# Patient Record
Sex: MALE | Race: WHITE | NOT HISPANIC OR LATINO | Employment: PART TIME | URBAN - METROPOLITAN AREA
[De-identification: names, ages, dates, MRNs, and addresses within clinical notes are randomized per-mention and may not be internally consistent; named-entity substitution may affect disease eponyms.]

---

## 2017-04-03 ENCOUNTER — LAB VISIT (OUTPATIENT)
Dept: LAB | Facility: HOSPITAL | Age: 77
End: 2017-04-03
Attending: INTERNAL MEDICINE
Payer: MEDICARE

## 2017-04-03 DIAGNOSIS — E11.9 CONTROLLED TYPE 2 DIABETES MELLITUS WITHOUT COMPLICATION, WITHOUT LONG-TERM CURRENT USE OF INSULIN: ICD-10-CM

## 2017-04-03 LAB
ALBUMIN SERPL BCP-MCNC: 3.8 G/DL
ALP SERPL-CCNC: 48 U/L
ALT SERPL W/O P-5'-P-CCNC: 17 U/L
ANION GAP SERPL CALC-SCNC: 8 MMOL/L
AST SERPL-CCNC: 23 U/L
BILIRUB SERPL-MCNC: 1.2 MG/DL
BUN SERPL-MCNC: 24 MG/DL
CALCIUM SERPL-MCNC: 9.8 MG/DL
CHLORIDE SERPL-SCNC: 105 MMOL/L
CHOLEST/HDLC SERPL: 3.3 {RATIO}
CO2 SERPL-SCNC: 27 MMOL/L
CREAT SERPL-MCNC: 1.2 MG/DL
EST. GFR  (AFRICAN AMERICAN): >60 ML/MIN/1.73 M^2
EST. GFR  (NON AFRICAN AMERICAN): 58 ML/MIN/1.73 M^2
GLUCOSE SERPL-MCNC: 97 MG/DL
HDL/CHOLESTEROL RATIO: 30.4 %
HDLC SERPL-MCNC: 181 MG/DL
HDLC SERPL-MCNC: 55 MG/DL
LDLC SERPL CALC-MCNC: 110.8 MG/DL
NONHDLC SERPL-MCNC: 126 MG/DL
POTASSIUM SERPL-SCNC: 4.6 MMOL/L
PROT SERPL-MCNC: 7.6 G/DL
SODIUM SERPL-SCNC: 140 MMOL/L
TRIGL SERPL-MCNC: 76 MG/DL

## 2017-04-03 PROCEDURE — 80053 COMPREHEN METABOLIC PANEL: CPT

## 2017-04-03 PROCEDURE — 36415 COLL VENOUS BLD VENIPUNCTURE: CPT | Mod: PO

## 2017-04-03 PROCEDURE — 83036 HEMOGLOBIN GLYCOSYLATED A1C: CPT

## 2017-04-03 PROCEDURE — 80061 LIPID PANEL: CPT

## 2017-04-04 LAB
ESTIMATED AVG GLUCOSE: 123 MG/DL
HBA1C MFR BLD HPLC: 5.9 %

## 2017-04-10 ENCOUNTER — OFFICE VISIT (OUTPATIENT)
Dept: INTERNAL MEDICINE | Facility: CLINIC | Age: 77
End: 2017-04-10
Payer: MEDICARE

## 2017-04-10 VITALS
RESPIRATION RATE: 20 BRPM | HEART RATE: 46 BPM | SYSTOLIC BLOOD PRESSURE: 149 MMHG | TEMPERATURE: 97 F | WEIGHT: 215.38 LBS | BODY MASS INDEX: 30.83 KG/M2 | HEIGHT: 70 IN | OXYGEN SATURATION: 97 % | DIASTOLIC BLOOD PRESSURE: 71 MMHG

## 2017-04-10 DIAGNOSIS — E11.59 HYPERTENSION ASSOCIATED WITH DIABETES: ICD-10-CM

## 2017-04-10 DIAGNOSIS — E11.9 CONTROLLED TYPE 2 DIABETES MELLITUS WITHOUT COMPLICATION, WITHOUT LONG-TERM CURRENT USE OF INSULIN: Primary | ICD-10-CM

## 2017-04-10 DIAGNOSIS — I15.2 HYPERTENSION ASSOCIATED WITH DIABETES: ICD-10-CM

## 2017-04-10 DIAGNOSIS — E11.69 HYPERLIPIDEMIA ASSOCIATED WITH TYPE 2 DIABETES MELLITUS: ICD-10-CM

## 2017-04-10 DIAGNOSIS — E78.5 HYPERLIPIDEMIA ASSOCIATED WITH TYPE 2 DIABETES MELLITUS: ICD-10-CM

## 2017-04-10 DIAGNOSIS — Z12.5 PROSTATE CANCER SCREENING: ICD-10-CM

## 2017-04-10 DIAGNOSIS — Z12.11 SCREENING FOR COLON CANCER: ICD-10-CM

## 2017-04-10 PROCEDURE — 99214 OFFICE O/P EST MOD 30 MIN: CPT | Mod: S$PBB,,, | Performed by: INTERNAL MEDICINE

## 2017-04-10 PROCEDURE — 99999 PR PBB SHADOW E&M-EST. PATIENT-LVL III: CPT | Mod: PBBFAC,,, | Performed by: INTERNAL MEDICINE

## 2017-04-10 PROCEDURE — 99213 OFFICE O/P EST LOW 20 MIN: CPT | Mod: PBBFAC,PO | Performed by: INTERNAL MEDICINE

## 2017-04-10 NOTE — PROGRESS NOTES
"HPI:  Patient is a 76-year-old man who comes in today for follow-up his diabetes, hypertension, lipids.  Patient does not check his blood pressure at this time.  His blood sugars have been doing well.  He denies any new other problems or complaints.    Current meds have been verified and updated per the EMR  Exam:BP (!) 149/71 (BP Location: Right arm, Patient Position: Sitting, BP Method: Automatic)  Pulse (!) 46  Temp 96.5 °F (35.8 °C) (Tympanic)   Resp 20  Ht 5' 9.69" (1.77 m)  Wt 97.7 kg (215 lb 6.2 oz)  SpO2 97%  BMI 31.19 kg/m2  Chest clear  Cardiovascular regular rate and rhythm without murmur, gallop or rub  Extremities without edema  Lab Results   Component Value Date    WBC 6.43 10/15/2016    HGB 14.5 10/15/2016    HCT 43.1 10/15/2016     10/15/2016    CHOL 181 04/03/2017    TRIG 76 04/03/2017    HDL 55 04/03/2017    ALT 17 04/03/2017    AST 23 04/03/2017     04/03/2017    K 4.6 04/03/2017     04/03/2017    CREATININE 1.2 04/03/2017    BUN 24 (H) 04/03/2017    CO2 27 04/03/2017    TSH 1.443 10/15/2016    PSA 2.9 10/15/2016    INR 1.0 01/08/2010    HGBA1C 5.9 04/03/2017       Impression:  Hypertension, not as well controlled as we would like  Diabetes, well controlled  Patient Active Problem List   Diagnosis    Anxiety    Depression    Hyperlipidemia associated with type 2 diabetes mellitus    Hypertension associated with diabetes    Diabetes mellitus type II, controlled    Left inguinal hernia       Plan:  Orders Placed This Encounter    Hemoglobin A1c    Comprehensive metabolic panel    Lipid panel    Protein / creatinine ratio, urine    TSH    CBC auto differential    PSA, Screening     He is due for his colonoscopy.  He will start to check his blood pressure twice a week and let me know if it's not 130/80 at home.  Patient will see me in 6 months with above lab work.  His other medications remain the same    "

## 2017-04-10 NOTE — MR AVS SNAPSHOT
[facility]  [address] [place] [id number]  Phone: [phone]                  Bi Jessica   4/10/2017 2:20 PM   Office Visit    Description:  Male : 1940   Provider:  Thee Zarate MD   Department:  [facility]           Reason for Visit     Follow-up           Diagnoses this Visit        Comments    Controlled type 2 diabetes mellitus without complication, without long-term current use of insulin    -  Primary     Hyperlipidemia associated with type 2 diabetes mellitus         Hypertension associated with diabetes         Prostate cancer screening                To Do List           Goals (5 Years of Data)     None      Follow-Up and Disposition     Return in about 6 months (around 10/10/2017).      [facility]     [facility] On Call Nurse Care Line -  Assistance  Unless otherwise directed by your provider, please contact [facility], our nurse care line that is available for  assistance.     Registered nurses in the [facility] On Call Center provide: appointment scheduling, clinical advisement, health education, and other advisory services.  Call: [phone] (toll free)               Medications           Message regarding Medications     Verify the changes and/or additions to your medication regime listed below are the same as discussed with your clinician today.  If any of these changes or additions are incorrect, please notify your healthcare provider.             Verify that the below list of medications is an accurate representation of the medications you are currently taking.  If none reported, the list may be blank. If incorrect, please contact your healthcare provider. Carry this list with you in case of emergency.           Current Medications     aspirin 325 MG tablet Take 325 mg by mouth once daily.    calcium-vitamin D3 500 mg(1,250mg) -200 unit per tablet Take 1 tablet by mouth every evening.    cyanocobalamin (VITAMIN  "B-12) 1000 MCG tablet Take 100 mcg by mouth once daily.    fish oil-omega-3 fatty acids 300-1,000 mg capsule Take 2 g by mouth once daily.    FLAXSEED OIL ORAL Take by mouth.    lisinopril-hydrochlorothiazide (PRINZIDE,ZESTORETIC) 20-12.5 mg per tablet Take 1 tablet by mouth once daily.    metformin (GLUCOPHAGE) 850 MG tablet Take 1 tablet (850 mg total) by mouth once daily.    simvastatin (ZOCOR) 40 MG tablet Take 1 tablet (40 mg total) by mouth once daily.    vitamin E 1000 UNIT capsule Take 1,000 Units by mouth once daily.           Clinical Reference Information           Your Vitals Were     BP Pulse Temp Resp    149/71 (BP Location: Right arm, Patient Position: Sitting, BP Method: Automatic) 46 96.5 °F (35.8 °C) (Tympanic) 20    Height Weight SpO2 BMI    5' 9.69" (1.77 m) 97.7 kg (215 lb 6.2 oz) 97% 31.19 kg/m2      Blood Pressure          Most Recent Value    BP  (!)  149/71      Allergies as of 4/10/2017     Codeine      Immunizations Administered on Date of Encounter - 4/10/2017     None      Orders Placed During Today's Visit     Future Labs/Procedures Expected by Expires    CBC auto differential  10/7/2017 (Approximate) 6/9/2018    Comprehensive metabolic panel  10/7/2017 (Approximate) 6/9/2018    Hemoglobin A1c  10/7/2017 (Approximate) 6/9/2018    Lipid panel  10/7/2017 (Approximate) 6/9/2018    Protein / creatinine ratio, urine  10/7/2017 (Approximate) 10/14/2017    PSA, Screening  10/7/2017 (Approximate) 6/9/2018    TSH  10/7/2017 (Approximate) 6/9/2018      [facility] Sign-Up     Activating your [facility] account is as easy as 1-2-3!     1) Visit [email or web address], select Sign Up Now, enter this activation code and your date of birth, then select Next.  [email or web address]  Expires: 5/25/2017  2:39 PM      2) Create a username and password to use when you visit [facility] in the future and select a security question in case you lose your password and select Next.    3) Enter your e-mail address and click " Sign Up!    Additional Information  If you have questions, please e-mail [email or web address] or call [phone] to talk to our [facility] staff. Remember, [facility] is NOT to be used for urgent needs. For medical emergencies, dial 911.         Language Assistance Services     ATTENTION: Language assistance services are available, free of charge. Please call [phone].      ATENCIÓN: Si habla español, tiene a sal disposición servicios gratuitos de asistencia lingüística. Llame al [phone].     [facility] Ý: N?u b?n nói Ti?ng Vi?t, có các d?ch v? h? tr? ngôn ng? mi?n phí dành cho b?n. G?i s? [phone].         [facility] complies with applicable Federal civil rights laws and does not discriminate on the basis of race, color, national origin, age, disability, or sex.

## 2017-04-18 RX ORDER — SODIUM, POTASSIUM,MAG SULFATES 17.5-3.13G
SOLUTION, RECONSTITUTED, ORAL ORAL
Qty: 354 ML | Refills: 0 | Status: ON HOLD | OUTPATIENT
Start: 2017-04-18 | End: 2017-05-01 | Stop reason: ALTCHOICE

## 2017-04-30 PROBLEM — Z86.010 HISTORY OF COLON POLYPS: Status: ACTIVE | Noted: 2017-04-30

## 2017-04-30 PROBLEM — Z86.0100 HISTORY OF COLON POLYPS: Status: ACTIVE | Noted: 2017-04-30

## 2017-05-01 ENCOUNTER — ANESTHESIA (OUTPATIENT)
Dept: ENDOSCOPY | Facility: HOSPITAL | Age: 77
End: 2017-05-01
Payer: MEDICARE

## 2017-05-01 ENCOUNTER — SURGERY (OUTPATIENT)
Age: 77
End: 2017-05-01

## 2017-05-01 ENCOUNTER — HOSPITAL ENCOUNTER (OUTPATIENT)
Facility: HOSPITAL | Age: 77
Discharge: HOME | End: 2017-05-01
Attending: INTERNAL MEDICINE | Admitting: INTERNAL MEDICINE
Payer: MEDICARE

## 2017-05-01 ENCOUNTER — ANESTHESIA EVENT (OUTPATIENT)
Dept: ENDOSCOPY | Facility: HOSPITAL | Age: 77
End: 2017-05-01
Payer: MEDICARE

## 2017-05-01 VITALS
SYSTOLIC BLOOD PRESSURE: 113 MMHG | HEART RATE: 51 BPM | RESPIRATION RATE: 16 BRPM | OXYGEN SATURATION: 97 % | RESPIRATION RATE: 19 BRPM | DIASTOLIC BLOOD PRESSURE: 87 MMHG | TEMPERATURE: 99 F

## 2017-05-01 DIAGNOSIS — Z86.010 PERSONAL HISTORY OF COLONIC POLYPS: ICD-10-CM

## 2017-05-01 DIAGNOSIS — Z86.010 HISTORY OF COLON POLYPS: Primary | ICD-10-CM

## 2017-05-01 PROBLEM — Z86.0100 PERSONAL HISTORY OF COLONIC POLYPS: Status: ACTIVE | Noted: 2017-05-01

## 2017-05-01 LAB
GLUCOSE SERPL-MCNC: 107 MG/DL (ref 70–110)
POCT GLUCOSE: 107 MG/DL (ref 70–110)

## 2017-05-01 PROCEDURE — 37000008 HC ANESTHESIA 1ST 15 MINUTES: Performed by: INTERNAL MEDICINE

## 2017-05-01 PROCEDURE — 63600175 PHARM REV CODE 636 W HCPCS: Performed by: NURSE ANESTHETIST, CERTIFIED REGISTERED

## 2017-05-01 PROCEDURE — 37000009 HC ANESTHESIA EA ADD 15 MINS: Performed by: INTERNAL MEDICINE

## 2017-05-01 PROCEDURE — 25000003 PHARM REV CODE 250: Performed by: NURSE ANESTHETIST, CERTIFIED REGISTERED

## 2017-05-01 PROCEDURE — G0105 COLORECTAL SCRN; HI RISK IND: HCPCS | Performed by: INTERNAL MEDICINE

## 2017-05-01 PROCEDURE — 25000003 PHARM REV CODE 250: Performed by: INTERNAL MEDICINE

## 2017-05-01 PROCEDURE — G0105 COLORECTAL SCRN; HI RISK IND: HCPCS | Mod: ,,, | Performed by: INTERNAL MEDICINE

## 2017-05-01 RX ORDER — PROPOFOL 10 MG/ML
VIAL (ML) INTRAVENOUS
Status: DISCONTINUED | OUTPATIENT
Start: 2017-05-01 | End: 2017-05-01

## 2017-05-01 RX ORDER — SODIUM CHLORIDE, SODIUM LACTATE, POTASSIUM CHLORIDE, CALCIUM CHLORIDE 600; 310; 30; 20 MG/100ML; MG/100ML; MG/100ML; MG/100ML
INJECTION, SOLUTION INTRAVENOUS CONTINUOUS
Status: DISCONTINUED | OUTPATIENT
Start: 2017-05-01 | End: 2017-05-01 | Stop reason: HOSPADM

## 2017-05-01 RX ORDER — LIDOCAINE HCL/PF 100 MG/5ML
SYRINGE (ML) INTRAVENOUS
Status: DISCONTINUED | OUTPATIENT
Start: 2017-05-01 | End: 2017-05-01

## 2017-05-01 RX ORDER — GLYCOPYRROLATE 0.2 MG/ML
INJECTION INTRAMUSCULAR; INTRAVENOUS
Status: DISCONTINUED | OUTPATIENT
Start: 2017-05-01 | End: 2017-05-01

## 2017-05-01 RX ADMIN — GLYCOPYRROLATE 0.2 MG: 0.2 INJECTION INTRAMUSCULAR; INTRAVENOUS at 01:05

## 2017-05-01 RX ADMIN — PROPOFOL 100 MG: 10 INJECTION, EMULSION INTRAVENOUS at 12:05

## 2017-05-01 RX ADMIN — PROPOFOL 40 MG: 10 INJECTION, EMULSION INTRAVENOUS at 01:05

## 2017-05-01 RX ADMIN — LIDOCAINE HYDROCHLORIDE 40 MG: 20 INJECTION, SOLUTION INTRAVENOUS at 12:05

## 2017-05-01 RX ADMIN — SODIUM CHLORIDE, SODIUM LACTATE, POTASSIUM CHLORIDE, AND CALCIUM CHLORIDE: .6; .31; .03; .02 INJECTION, SOLUTION INTRAVENOUS at 12:05

## 2017-05-01 NOTE — DISCHARGE SUMMARY
Endoscopy Discharge Summary      Admit Date: 5/1/2017    Discharge Date and Time:  5/1/2017 1:23 PM    Attending Physician: Vishal Andrade MD     Discharge Physician: Vishal Andrade MD     Principal Admitting Diagnoses: History of colon polyps         Discharge Diagnosis: The primary encounter diagnosis was History of colon polyps. A diagnosis of Personal history of colonic polyps was also pertinent to this visit.     Discharged Condition: Good    Indication for Admission: History of colon polyps     Hospital Course: Patient was admitted for an inpatient procedure and tolerated the procedure well with no complications.    Significant Diagnostic Studies: COLONOSCOPY    Pathology (if any):  Specimen     None          Disposition: Home.    Bleeding: None    No Implants    Follow Up/Patient Instructions:   Current Discharge Medication List      CONTINUE these medications which have NOT CHANGED    Details   cyanocobalamin (VITAMIN B-12) 1000 MCG tablet Take 100 mcg by mouth once daily.      lisinopril-hydrochlorothiazide (PRINZIDE,ZESTORETIC) 20-12.5 mg per tablet Take 1 tablet by mouth once daily.  Qty: 90 tablet, Refills: 3      metformin (GLUCOPHAGE) 850 MG tablet Take 1 tablet (850 mg total) by mouth once daily.  Qty: 90 tablet, Refills: 3      simvastatin (ZOCOR) 40 MG tablet Take 1 tablet (40 mg total) by mouth once daily.  Qty: 90 tablet, Refills: 3      aspirin 325 MG tablet Take 325 mg by mouth once daily.      calcium-vitamin D3 500 mg(1,250mg) -200 unit per tablet Take 1 tablet by mouth every evening.      fish oil-omega-3 fatty acids 300-1,000 mg capsule Take 2 g by mouth once daily.      FLAXSEED OIL ORAL Take by mouth.      vitamin E 1000 UNIT capsule Take 1,000 Units by mouth once daily.               Discharge Procedure Orders  Diet general     Activity as tolerated     Call MD for:  temperature >100.4     Call MD for:  persistent nausea and vomiting     Call MD for:  severe uncontrolled pain     Call MD  for:  difficulty breathing, headache or visual disturbances     Call MD for:  redness, tenderness, or signs of infection (pain, swelling, redness, odor or green/yellow discharge around incision site)     Call MD for:  hives     Call MD for:  persistent dizziness or light-headedness     No dressing needed         Follow-up Information     Follow up with Thee Zarate MD.    Specialty:  Internal Medicine    Why:  As needed    Contact information:    [address]  [address]  [facility] [id number] [phone]

## 2017-05-01 NOTE — ANESTHESIA POSTPROCEDURE EVALUATION
Anesthesia Post Evaluation    Patient: Bi Jessica    Procedure(s) Performed: Procedure(s) (LRB):  COLONOSCOPY (N/A)    Final Anesthesia Type: MAC  Patient location during evaluation: PACU  Patient participation: Yes- Able to Participate  Level of consciousness: awake and alert  Post-procedure vital signs: reviewed and stable  Pain management: adequate  Airway patency: patent  PONV status at discharge: No PONV  Anesthetic complications: no      Cardiovascular status: blood pressure returned to baseline  Respiratory status: unassisted  Hydration status: euvolemic  Follow-up not needed.        Visit Vitals    BP (!) 106/55    Pulse (!) 55    Temp 37 °C (98.6 °F)    Resp 14    SpO2 96%       Pain/Laura Score: Pain Assessment Performed: Yes (5/1/2017 12:18 PM)  Presence of Pain: denies (5/1/2017 12:18 PM)

## 2017-05-01 NOTE — ANESTHESIA RELEASE NOTE
Anesthesia Release from PACU Note    Patient: Bi Jessica    Procedure(s) Performed: Procedure(s) (LRB):  COLONOSCOPY (N/A)    Anesthesia type: MAC    Post pain: Adequate analgesia    Post assessment: no apparent anesthetic complications    Last Vitals:   Visit Vitals    BP (!) 106/55    Pulse (!) 55    Temp 37 °C (98.6 °F)    Resp 14    SpO2 96%       Post vital signs: stable    Level of consciousness: awake    Nausea/Vomiting: no nausea/no vomiting    Complications: none    Airway Patency: patent    Respiratory: unassisted    Cardiovascular: stable and blood pressure at baseline    Hydration: euvolemic

## 2017-05-01 NOTE — TRANSFER OF CARE
Anesthesia Transfer of Care Note    Patient: Bi Jessica    Procedure(s) Performed: Procedure(s) (LRB):  COLONOSCOPY (N/A)    Patient location: PACU    Anesthesia Type: MAC    Transport from OR: Transported from OR on room air with adequate spontaneous ventilation    Post pain: adequate analgesia    Post assessment: no apparent anesthetic complications    Post vital signs: stable    Level of consciousness: awake    Nausea/Vomiting: no nausea/vomiting    Complications: none          Last vitals:   Visit Vitals    BP (!) 106/55    Pulse (!) 55    Temp 37 °C (98.6 °F)    Resp 14    SpO2 96%

## 2017-05-01 NOTE — ANESTHESIA PREPROCEDURE EVALUATION
05/01/2017  Bi Jessica is a 77 y.o., male.    Anesthesia Evaluation    I have reviewed the Patient Summary Reports.    I have reviewed the Nursing Notes.   I have reviewed the Medications.     Review of Systems  Anesthesia Hx:  No problems with previous Anesthesia    Social:  Non-Smoker    Hematology/Oncology:  Hematology Normal   Oncology Normal     EENT/Dental:EENT/Dental Normal   Cardiovascular:   Hypertension, well controlled hyperlipidemia    Pulmonary:  Pulmonary Normal    Renal/:  Renal/ Normal     Hepatic/GI:   Bowel Prep.  Bowel Conditions:  Bowel Neoplasm:, Adenomatous Polyp    Musculoskeletal:  Musculoskeletal Normal    Neurological:  Neurology Normal    Endocrine:   Diabetes, well controlled, type 2    Dermatological:  Skin Normal    Psych:   Psychiatric History anxiety depression          Physical Exam  General:  Well nourished    Airway/Jaw/Neck:  Airway Findings: Mouth Opening: Normal Tongue: Normal  General Airway Assessment: Adult       Chest/Lungs:  Chest/Lungs Findings: Clear to auscultation     Heart/Vascular:  Heart Findings: Rate: Normal             Anesthesia Plan  Type of Anesthesia, risks & benefits discussed:  Anesthesia Type:  MAC  Patient's Preference:   Intra-op Monitoring Plan:   Intra-op Monitoring Plan Comments:   Post Op Pain Control Plan:   Post Op Pain Control Plan Comments:   Induction:   IV  Beta Blocker:  Patient is not currently on a Beta-Blocker (No further documentation required).       Informed Consent: Patient understands risks and agrees with Anesthesia plan.  Questions answered. Anesthesia consent signed with patient.  ASA Score: 3     Day of Surgery Review of History & Physical: I have interviewed and examined the patient. I have reviewed the patient's H&P dated:  There are no significant changes.          Ready For Surgery From Anesthesia Perspective.

## 2017-05-01 NOTE — DISCHARGE INSTRUCTIONS
If you develop fevers, severe abdominal pain, significant bleeding, nausea or vomiting, please contact us or come to our Emergency Department at [facility].  Our  contact number is [phone] available for emergencies or any question that you may have.      You may return to normal activities tomorrow.  Written discharge instructions were provided to you today and have them handy since you may not remember our conversation today.       I also recommend that you follow a high fiber diet and take calcium supplements daily as well as to continue your present medications.      If you take Aspirin, please resume taking it at your prior dose today. If we obtained biopsies or removed polyps during your procedure, we will contact you within 5 to 6 days with the results.      Thanks for trusting us with your healthcare needs.      Sincerely,     Vishal Andrade M.D.        To rate your experience with Dr. Andrade, please click on the link below     [email or web address]

## 2017-05-01 NOTE — INTERVAL H&P NOTE
The patient has been examined and the H&P has been reviewed:    I concur with the findings and no changes have occurred since H&P was written.    Anesthesia/Surgery risks, benefits and alternative options discussed and understood by patient/family.    No current facility-administered medications on file prior to encounter.      Current Outpatient Prescriptions on File Prior to Encounter   Medication Sig Dispense Refill    cyanocobalamin (VITAMIN B-12) 1000 MCG tablet Take 100 mcg by mouth once daily.      lisinopril-hydrochlorothiazide (PRINZIDE,ZESTORETIC) 20-12.5 mg per tablet Take 1 tablet by mouth once daily. 90 tablet 3    metformin (GLUCOPHAGE) 850 MG tablet Take 1 tablet (850 mg total) by mouth once daily. 90 tablet 3    simvastatin (ZOCOR) 40 MG tablet Take 1 tablet (40 mg total) by mouth once daily. 90 tablet 3    aspirin 325 MG tablet Take 325 mg by mouth once daily.      calcium-vitamin D3 500 mg(1,250mg) -200 unit per tablet Take 1 tablet by mouth every evening.      fish oil-omega-3 fatty acids 300-1,000 mg capsule Take 2 g by mouth once daily.      FLAXSEED OIL ORAL Take by mouth.      vitamin E 1000 UNIT capsule Take 1,000 Units by mouth once daily.       Review of patient's allergies indicates:   Allergen Reactions    Codeine            Active Hospital Problems    Diagnosis  POA    *History of colon polyps [Z86.010]  Not Applicable     Last Colonoscopy in 2012      Personal history of colonic polyps [Z86.010]  Not Applicable      Resolved Hospital Problems    Diagnosis Date Resolved POA   No resolved problems to display.

## 2017-05-01 NOTE — IP AVS SNAPSHOT
[address] [place] [address]           Patient Discharge Instructions   Our goal is to set you up for success. This packet includes information on your condition, medications, and your home care.  It will help you care for yourself to prevent having to return to the hospital.     Please ask your nurse if you have any questions.      There are many details to remember when preparing to leave the hospital. Here is what you will need to do:    1. Take your medicine. If you are prescribed medications, review your Medication List on the following pages. You may have new medications to  at the pharmacy and others that you'll need to stop taking. Review the instructions for how and when to take your medications. Talk with your doctor or nurses if you are unsure of what to do.     2. Go to your follow-up appointments. Specific follow-up information is listed in the following pages. Your may be contacted by a nurse or clinical provider about future appointments. Be sure we have all of the phone numbers to reach you. Please contact your provider's office if you are unable to make an appointment.     3. Watch for warning signs. Your doctor or nurse will give you detailed warning signs to watch for and when to call for assistance. These instructions may also include educational information about your condition. If you experience any of warning signs to your health, call your doctor.               ** Verify the list of medication(s) below is accurate and up to date. Carry this with you in case of emergency. If your medications have changed, please notify your healthcare provider.             Medication List      CONTINUE taking these medications        Additional Info                      aspirin 325 MG tablet   Refills:  0   Dose:  325 mg    Instructions:  Take 325 mg by mouth once daily.     Begin Date    AM    Noon    PM    Bedtime       calcium-vitamin D3 500  mg(1,250mg) -200 unit per tablet   Refills:  0   Dose:  1 tablet    Instructions:  Take 1 tablet by mouth every evening.     Begin Date    AM    Noon    PM    Bedtime       cyanocobalamin 1000 MCG tablet   Commonly known as:  VITAMIN B-12   Refills:  0   Dose:  100 mcg    Instructions:  Take 100 mcg by mouth once daily.     Begin Date    AM    Noon    PM    Bedtime       fish oil-omega-3 fatty acids 300-1,000 mg capsule   Refills:  0   Dose:  2 g    Instructions:  Take 2 g by mouth once daily.     Begin Date    AM    Noon    PM    Bedtime       FLAXSEED OIL ORAL   Refills:  0    Instructions:  Take by mouth.     Begin Date    AM    Noon    PM    Bedtime       lisinopril-hydrochlorothiazide 20-12.5 mg per tablet   Commonly known as:  PRINZIDE,ZESTORETIC   Quantity:  90 tablet   Refills:  3   Dose:  1 tablet    Instructions:  Take 1 tablet by mouth once daily.     Begin Date    AM    Noon    PM    Bedtime       metformin 850 MG tablet   Commonly known as:  GLUCOPHAGE   Quantity:  90 tablet   Refills:  3   Dose:  850 mg    Instructions:  Take 1 tablet (850 mg total) by mouth once daily.     Begin Date    AM    Noon    PM    Bedtime       simvastatin 40 MG tablet   Commonly known as:  ZOCOR   Quantity:  90 tablet   Refills:  3   Dose:  40 mg    Instructions:  Take 1 tablet (40 mg total) by mouth once daily.     Begin Date    AM    Noon    PM    Bedtime       vitamin E 1000 UNIT capsule   Refills:  0   Dose:  1000 Units    Instructions:  Take 1,000 Units by mouth once daily.     Begin Date    AM    Noon    PM    Bedtime                  Please bring to all follow up appointments:    1. A copy of your discharge instructions.  2. All medicines you are currently taking in their original bottles.  3. Identification and insurance card.    Please arrive 15 minutes ahead of scheduled appointment time.    Please call 24 hours in advance if you must reschedule your appointment and/or time.        Your Scheduled Appointments      Oct 09, 2017  8:20 AM CDT   Fasting Lab with LABORATORY, [place]   [facility] ([facility])    [address] [id number] [phone]            Oct 09, 2017  8:30 AM CDT   Urine with SPECIMEN, [facility] - Specimen Laboratory ([facility])    [address] [place] [id number] [phone]            Oct 16, 2017  9:20 AM CDT   Established Patient Visit with Thee Zarate MD   [facility] Internal Medicine ([facility])    [address] [place] [id number] [phone]              Follow-up Information     Follow up with Thee Zarate MD.    Specialty:  Internal Medicine    Why:  As needed    Contact information:    [address] [place] [address] [phone]          Discharge Instructions     Future Orders    Activity as tolerated     Call MD for:  difficulty breathing, headache or visual disturbances     Call MD for:  hives     Call MD for:  persistent dizziness or light-headedness     Call MD for:  persistent nausea and vomiting     Call MD for:  redness, tenderness, or signs of infection (pain, swelling, redness, odor or green/yellow discharge around incision site)     Call MD for:  severe uncontrolled pain     Call MD for:  temperature >100.4     Diet general     Questions:    Total calories:      Fat restriction, if any:      Protein restriction, if any:      Na restriction, if any:      Fluid restriction:      Additional restrictions:      No dressing needed         Discharge Instructions       If you develop fevers, severe abdominal pain, significant bleeding, nausea or vomiting, please contact us or come to our Emergency Department at [facility].  Our  contact number is [phone] available for emergencies or any question that you may have.      You may return to normal activities tomorrow.  Written discharge instructions were provided to you today and have them handy since you may  not remember our conversation today.       I also recommend that you follow a high fiber diet and take calcium supplements daily as well as to continue your present medications.      If you take Aspirin, please resume taking it at your prior dose today. If we obtained biopsies or removed polyps during your procedure, we will contact you within 5 to 6 days with the results.      Thanks for trusting us with your healthcare needs.      Sincerely,     Vishal Andrade M.D.        To rate your experience with Dr. Andrade, please click on the link below     [email or web address]      Discharge References/Attachments     COLONOSCOPY  (ENGLISH)    COLORECTAL CANCER SCREENING  (ENGLISH)        Primary Diagnosis     Your primary diagnosis was:  History Of Colonic Polyps      Admission Information     Date & Time Provider Department CSN    5/1/2017 11:46 AM Vishal Andrade MD [facility] -  [id number]      Care Providers     Provider Role Specialty Primary office phone    Vishal Andrade MD Attending Provider Gastroenterology [phone]    Vishal Andrade MD Surgeon  Gastroenterology [phone]      Your Vitals Were     BP Pulse Temp Resp SpO2       106/55 55 98.6 °F (37 °C) 14 96%       Recent Lab Values        5/2/2013 12/10/2013 6/19/2014 12/29/2014 7/2/2015 1/4/2016 10/15/2016 4/3/2017      8:08 AM 10:07 AM  8:07 AM  7:40 AM  7:31 AM  8:02 AM  8:06 AM  9:57 AM    A1C 5.8 6.0 5.8 5.9 5.9 5.8 6.0 5.9    Comment for A1C at  8:06 AM on 10/15/2016:  According to ADA guidelines, hemoglobin A1C <7.0% represents  optimal control in non-pregnant diabetic patients.  Different  metrics may apply to specific populations.   Standards of Medical Care in Diabetes - 2016.  For the purpose of screening for the presence of diabetes:  <5.7%     Consistent with the absence of diabetes  5.7-6.4%  Consistent with increasing risk for diabetes   (prediabetes)  >or=6.5%  Consistent with  diabetes  Currently no consensus exists for use of hemoglobin A1C  for diagnosis of diabetes for children.      Comment for A1C at  9:57 AM on 4/3/2017:  According to ADA guidelines, hemoglobin A1C <7.0% represents  optimal control in non-pregnant diabetic patients.  Different  metrics may apply to specific populations.   Standards of Medical Care in Diabetes - 2016.  For the purpose of screening for the presence of diabetes:  <5.7%     Consistent with the absence of diabetes  5.7-6.4%  Consistent with increasing risk for diabetes   (prediabetes)  >or=6.5%  Consistent with diabetes  Currently no consensus exists for use of hemoglobin A1C  for diagnosis of diabetes for children.        Allergies as of 5/1/2017        Reactions    Codeine       [facility]     [facility] Nurse Care Line - 24/7 Assistance  Unless otherwise directed by your provider, please contact [facility], our nurse care line that is available for 24/7 assistance.     Registered nurses in the [facility] provide clinical advisement, health education, appointment booking, and other advisory services.  Call for this free service at [phone].        Advance Directives     An advance directive is a document which, in the event you are no longer able to make decisions for yourself, tells your healthcare team what kind of treatment you do or do not want to receive, or who you would like to make those decisions for you.  If you do not currently have an advance directive, [facility] encourages you to create one.  For more information call:  [phone]), [phone] ([phone]),  or log on to [email or web address].        Smoking Cessation     If you would like to quit smoking:   You may be eligible for free services if you are a [place] resident and started smoking cigarettes before September 1, 1988.  Call the Smoking Cessation Trust (SCT) toll free at (764) 769-3770 or (498) 034-9623.   Call  1-800-QUIT-NOW if you do not meet the above criteria.   Contact us via email: [email or web address]   View our website for more information: [email or web address]        Language Assistance Services     ATTENTION: Language assistance services are available, free of charge. Please call [phone].      ATENCIÓN: Si habla zamzam, tiene a sal disposición servicios gratuitos de asistencia lingüística. Llame al [phone].     CHÚ Ý: N?u b?n nói Ti?ng Vi?t, có các d?ch v? h? tr? ngôn ng? mi?n phí dành cho b?n. G?i s? [phone].        Diabetes Discharge Instructions                                   [facility] Sign-Up     Activating your [facility] account is as easy as 1-2-3!     1) Visit [email or web address], select Sign Up Now, enter this activation code and your date of birth, then select Next.  [email or web address]  Expires: 5/25/2017  2:39 PM      2) Create a username and password to use when you visit [facility] in the future and select a security question in case you lose your password and select Next.    3) Enter your e-mail address and click Sign Up!    Additional Information  If you have questions, please e-mail [email or web address] or call [phone] to talk to our [facility] staff. Remember, [facility] is NOT to be used for urgent needs. For medical emergencies, dial 911.          [facility] complies with applicable Federal civil rights laws and does not discriminate on the basis of race, color, national origin, age, disability, or sex.

## 2017-06-08 RX ORDER — SIMVASTATIN 40 MG/1
TABLET, FILM COATED ORAL
Qty: 90 TABLET | Refills: 3 | Status: SHIPPED | OUTPATIENT
Start: 2017-06-08 | End: 2018-06-29 | Stop reason: SDUPTHER

## 2017-06-08 RX ORDER — LISINOPRIL AND HYDROCHLOROTHIAZIDE 12.5; 2 MG/1; MG/1
TABLET ORAL
Qty: 90 TABLET | Refills: 3 | Status: SHIPPED | OUTPATIENT
Start: 2017-06-08 | End: 2018-06-29 | Stop reason: SDUPTHER

## 2017-06-08 RX ORDER — METFORMIN HYDROCHLORIDE 850 MG/1
TABLET ORAL
Qty: 90 TABLET | Refills: 3 | Status: SHIPPED | OUTPATIENT
Start: 2017-06-08 | End: 2018-06-29 | Stop reason: SDUPTHER

## 2017-09-11 ENCOUNTER — TELEPHONE (OUTPATIENT)
Dept: INTERNAL MEDICINE | Facility: CLINIC | Age: 77
End: 2017-09-11

## 2017-09-11 NOTE — TELEPHONE ENCOUNTER
----- Message from Maia Niño sent at 9/11/2017 11:56 AM CDT -----  Pt is requesting a call from nurse to discuss an over the counter medication for wards         Please call pt back at [phone]

## 2017-10-10 ENCOUNTER — LAB VISIT (OUTPATIENT)
Dept: LAB | Facility: HOSPITAL | Age: 77
End: 2017-10-10
Attending: INTERNAL MEDICINE
Payer: MEDICARE

## 2017-10-10 DIAGNOSIS — E11.9 CONTROLLED TYPE 2 DIABETES MELLITUS WITHOUT COMPLICATION, WITHOUT LONG-TERM CURRENT USE OF INSULIN: ICD-10-CM

## 2017-10-10 DIAGNOSIS — Z12.5 PROSTATE CANCER SCREENING: ICD-10-CM

## 2017-10-10 LAB
ALBUMIN SERPL BCP-MCNC: 3.6 G/DL
ALP SERPL-CCNC: 48 U/L
ALT SERPL W/O P-5'-P-CCNC: 19 U/L
ANION GAP SERPL CALC-SCNC: 8 MMOL/L
AST SERPL-CCNC: 20 U/L
BASOPHILS # BLD AUTO: 0.03 K/UL
BASOPHILS NFR BLD: 0.4 %
BILIRUB SERPL-MCNC: 0.9 MG/DL
BUN SERPL-MCNC: 22 MG/DL
CALCIUM SERPL-MCNC: 9.7 MG/DL
CHLORIDE SERPL-SCNC: 106 MMOL/L
CHOLEST SERPL-MCNC: 177 MG/DL
CHOLEST/HDLC SERPL: 3 {RATIO}
CO2 SERPL-SCNC: 26 MMOL/L
CREAT SERPL-MCNC: 1.3 MG/DL
DIFFERENTIAL METHOD: NORMAL
EOSINOPHIL # BLD AUTO: 0.3 K/UL
EOSINOPHIL NFR BLD: 3.7 %
ERYTHROCYTE [DISTWIDTH] IN BLOOD BY AUTOMATED COUNT: 13.3 %
EST. GFR  (AFRICAN AMERICAN): >60 ML/MIN/1.73 M^2
EST. GFR  (NON AFRICAN AMERICAN): 52.3 ML/MIN/1.73 M^2
GLUCOSE SERPL-MCNC: 111 MG/DL
HCT VFR BLD AUTO: 45 %
HDLC SERPL-MCNC: 59 MG/DL
HDLC SERPL: 33.3 %
HGB BLD-MCNC: 15.1 G/DL
LDLC SERPL CALC-MCNC: 101.2 MG/DL
LYMPHOCYTES # BLD AUTO: 2.2 K/UL
LYMPHOCYTES NFR BLD: 32.8 %
MCH RBC QN AUTO: 30.9 PG
MCHC RBC AUTO-ENTMCNC: 33.6 G/DL
MCV RBC AUTO: 92 FL
MONOCYTES # BLD AUTO: 0.7 K/UL
MONOCYTES NFR BLD: 9.7 %
NEUTROPHILS # BLD AUTO: 3.6 K/UL
NEUTROPHILS NFR BLD: 53 %
NONHDLC SERPL-MCNC: 118 MG/DL
PLATELET # BLD AUTO: 187 K/UL
PMV BLD AUTO: 10.7 FL
POTASSIUM SERPL-SCNC: 4.1 MMOL/L
PROT SERPL-MCNC: 7.7 G/DL
RBC # BLD AUTO: 4.88 M/UL
SODIUM SERPL-SCNC: 140 MMOL/L
TRIGL SERPL-MCNC: 84 MG/DL
TSH SERPL DL<=0.005 MIU/L-ACNC: 1.77 UIU/ML
WBC # BLD AUTO: 6.77 K/UL

## 2017-10-10 PROCEDURE — 36415 COLL VENOUS BLD VENIPUNCTURE: CPT | Mod: PO

## 2017-10-10 PROCEDURE — 80061 LIPID PANEL: CPT

## 2017-10-10 PROCEDURE — 80053 COMPREHEN METABOLIC PANEL: CPT

## 2017-10-10 PROCEDURE — 83036 HEMOGLOBIN GLYCOSYLATED A1C: CPT

## 2017-10-10 PROCEDURE — 84153 ASSAY OF PSA TOTAL: CPT

## 2017-10-10 PROCEDURE — 85025 COMPLETE CBC W/AUTO DIFF WBC: CPT

## 2017-10-10 PROCEDURE — 84443 ASSAY THYROID STIM HORMONE: CPT

## 2017-10-11 LAB
COMPLEXED PSA SERPL-MCNC: 2.9 NG/ML
ESTIMATED AVG GLUCOSE: 114 MG/DL
HBA1C MFR BLD HPLC: 5.6 %

## 2017-10-16 ENCOUNTER — OFFICE VISIT (OUTPATIENT)
Dept: INTERNAL MEDICINE | Facility: CLINIC | Age: 77
End: 2017-10-16
Payer: MEDICARE

## 2017-10-16 VITALS
SYSTOLIC BLOOD PRESSURE: 118 MMHG | OXYGEN SATURATION: 98 % | DIASTOLIC BLOOD PRESSURE: 68 MMHG | TEMPERATURE: 97 F | BODY MASS INDEX: 31.41 KG/M2 | WEIGHT: 212.06 LBS | HEART RATE: 44 BPM | HEIGHT: 69 IN

## 2017-10-16 DIAGNOSIS — E78.5 HYPERLIPIDEMIA ASSOCIATED WITH TYPE 2 DIABETES MELLITUS: Primary | ICD-10-CM

## 2017-10-16 DIAGNOSIS — E11.59 HYPERTENSION ASSOCIATED WITH DIABETES: ICD-10-CM

## 2017-10-16 DIAGNOSIS — I15.2 HYPERTENSION ASSOCIATED WITH DIABETES: ICD-10-CM

## 2017-10-16 DIAGNOSIS — E11.69 HYPERLIPIDEMIA ASSOCIATED WITH TYPE 2 DIABETES MELLITUS: Primary | ICD-10-CM

## 2017-10-16 DIAGNOSIS — F32.A DEPRESSION, UNSPECIFIED DEPRESSION TYPE: ICD-10-CM

## 2017-10-16 DIAGNOSIS — E11.9 CONTROLLED TYPE 2 DIABETES MELLITUS WITHOUT COMPLICATION, WITHOUT LONG-TERM CURRENT USE OF INSULIN: ICD-10-CM

## 2017-10-16 DIAGNOSIS — N40.1 BPH WITH OBSTRUCTION/LOWER URINARY TRACT SYMPTOMS: ICD-10-CM

## 2017-10-16 DIAGNOSIS — Z86.010 HISTORY OF COLON POLYPS: ICD-10-CM

## 2017-10-16 DIAGNOSIS — F41.9 ANXIETY: ICD-10-CM

## 2017-10-16 DIAGNOSIS — N13.8 BPH WITH OBSTRUCTION/LOWER URINARY TRACT SYMPTOMS: ICD-10-CM

## 2017-10-16 PROCEDURE — 99213 OFFICE O/P EST LOW 20 MIN: CPT | Mod: PBBFAC,PO | Performed by: INTERNAL MEDICINE

## 2017-10-16 PROCEDURE — 99214 OFFICE O/P EST MOD 30 MIN: CPT | Mod: 25,S$PBB,, | Performed by: INTERNAL MEDICINE

## 2017-10-16 PROCEDURE — G0008 ADMIN INFLUENZA VIRUS VAC: HCPCS | Mod: PBBFAC,PO

## 2017-10-16 PROCEDURE — 99999 PR PBB SHADOW E&M-EST. PATIENT-LVL III: CPT | Mod: PBBFAC,,, | Performed by: INTERNAL MEDICINE

## 2017-10-16 RX ORDER — TADALAFIL 20 MG/1
TABLET ORAL
Qty: 5 TABLET | Refills: 11 | Status: SHIPPED | OUTPATIENT
Start: 2017-10-16 | End: 2017-10-16

## 2017-10-16 RX ORDER — TAMSULOSIN HYDROCHLORIDE 0.4 MG/1
0.4 CAPSULE ORAL DAILY
Qty: 30 CAPSULE | Refills: 11 | Status: SHIPPED | OUTPATIENT
Start: 2017-10-16 | End: 2018-02-02

## 2017-10-16 RX ORDER — SILDENAFIL CITRATE 20 MG/1
TABLET ORAL
Qty: 30 TABLET | Refills: 11 | Status: SHIPPED | OUTPATIENT
Start: 2017-10-16 | End: 2022-10-11

## 2017-10-26 ENCOUNTER — TELEPHONE (OUTPATIENT)
Dept: INTERNAL MEDICINE | Facility: CLINIC | Age: 77
End: 2017-10-26

## 2017-10-26 ENCOUNTER — LAB VISIT (OUTPATIENT)
Dept: LAB | Facility: HOSPITAL | Age: 77
End: 2017-10-26
Attending: INTERNAL MEDICINE
Payer: MEDICARE

## 2017-10-26 ENCOUNTER — APPOINTMENT (OUTPATIENT)
Dept: LAB | Facility: HOSPITAL | Age: 77
End: 2017-10-26
Attending: NURSE PRACTITIONER
Payer: MEDICARE

## 2017-10-26 DIAGNOSIS — R30.0 DYSURIA: ICD-10-CM

## 2017-10-26 DIAGNOSIS — R30.0 DYSURIA: Primary | ICD-10-CM

## 2017-10-26 LAB
BILIRUB UR QL STRIP: NEGATIVE
CLARITY UR REFRACT.AUTO: ABNORMAL
COLOR UR AUTO: YELLOW
GLUCOSE UR QL STRIP: NEGATIVE
HGB UR QL STRIP: NEGATIVE
KETONES UR QL STRIP: ABNORMAL
LEUKOCYTE ESTERASE UR QL STRIP: NEGATIVE
NITRITE UR QL STRIP: NEGATIVE
PH UR STRIP: 5 [PH] (ref 5–8)
PROT UR QL STRIP: NEGATIVE
SP GR UR STRIP: 1.01 (ref 1–1.03)
URN SPEC COLLECT METH UR: ABNORMAL
UROBILINOGEN UR STRIP-ACNC: NEGATIVE EU/DL

## 2017-10-26 PROCEDURE — 81003 URINALYSIS AUTO W/O SCOPE: CPT

## 2017-10-26 PROCEDURE — 87086 URINE CULTURE/COLONY COUNT: CPT

## 2017-10-26 NOTE — TELEPHONE ENCOUNTER
Spoke with patient he will speak with pharmacy about otc laxative alternatives. He will go to lab and have urine collected. He verbalized understanding.

## 2017-10-26 NOTE — TELEPHONE ENCOUNTER
----- Message from Kamini Jacobs sent at 10/26/2017 11:47 AM CDT -----  Contact: Pt   Pt called in regards stinging and burning during urination and very painful need to know if something OTC can be taking  or can something be called in...[phone] leave a message [phone]

## 2017-10-26 NOTE — TELEPHONE ENCOUNTER
Returned pt's phone call , pt sates that he has been having stinging and burning during urination and very painful x 2 days need to know if something OTC can be taking or can something be called into pharmacy please advise ./db**

## 2017-10-26 NOTE — TELEPHONE ENCOUNTER
----- Message from Sugey Hayes sent at 10/26/2017  1:19 PM CDT -----  Contact: [phone]  Pt would like to consult with nurse about constipation pain. Asked to be called bk at [phone]

## 2017-10-27 ENCOUNTER — TELEPHONE (OUTPATIENT)
Dept: INTERNAL MEDICINE | Facility: CLINIC | Age: 77
End: 2017-10-27

## 2017-10-27 ENCOUNTER — OFFICE VISIT (OUTPATIENT)
Dept: INTERNAL MEDICINE | Facility: CLINIC | Age: 77
End: 2017-10-27
Payer: MEDICARE

## 2017-10-27 VITALS
HEART RATE: 45 BPM | HEIGHT: 69 IN | BODY MASS INDEX: 30.36 KG/M2 | TEMPERATURE: 97 F | SYSTOLIC BLOOD PRESSURE: 134 MMHG | DIASTOLIC BLOOD PRESSURE: 70 MMHG | OXYGEN SATURATION: 99 % | WEIGHT: 205 LBS

## 2017-10-27 DIAGNOSIS — K59.00 CONSTIPATION, UNSPECIFIED CONSTIPATION TYPE: Primary | ICD-10-CM

## 2017-10-27 PROCEDURE — 99999 PR PBB SHADOW E&M-EST. PATIENT-LVL III: CPT | Mod: PBBFAC,,, | Performed by: INTERNAL MEDICINE

## 2017-10-27 PROCEDURE — 99213 OFFICE O/P EST LOW 20 MIN: CPT | Mod: S$PBB,,, | Performed by: INTERNAL MEDICINE

## 2017-10-27 PROCEDURE — 99213 OFFICE O/P EST LOW 20 MIN: CPT | Mod: PBBFAC,PO | Performed by: INTERNAL MEDICINE

## 2017-10-27 NOTE — PROGRESS NOTES
"HPI:  Patient is a 77-year-old man who was recently has had some problems with constipation.  He would like to be checked and a make sure he doesn't have an impaction.  He's been using MiraLAX as well as enemas at home.    Current meds have been verified and updated per the EMR  Exam:/70 (BP Location: Left arm)   Pulse (!) 45   Temp 97.3 °F (36.3 °C) (Tympanic)   Ht 5' 9" (1.753 m)   Wt 93 kg (205 lb 0.4 oz)   SpO2 99%   BMI 30.28 kg/m²   Rectal exam was completely unremarkable.  There is no stool in the vault at all.    Lab Results   Component Value Date    WBC 6.77 10/10/2017    HGB 15.1 10/10/2017    HCT 45.0 10/10/2017     10/10/2017    CHOL 177 10/10/2017    TRIG 84 10/10/2017    HDL 59 10/10/2017    ALT 19 10/10/2017    AST 20 10/10/2017     10/10/2017    K 4.1 10/10/2017     10/10/2017    CREATININE 1.3 10/10/2017    BUN 22 10/10/2017    CO2 26 10/10/2017    TSH 1.767 10/10/2017    PSA 2.9 10/10/2017    INR 1.0 01/08/2010    HGBA1C 5.6 10/10/2017       Impression:  Constipation  Patient Active Problem List   Diagnosis    Anxiety    Depression    Hyperlipidemia associated with type 2 diabetes mellitus    Hypertension associated with diabetes    Diabetes mellitus type II, controlled    Left inguinal hernia    History of colon polyps    BPH with obstruction/lower urinary tract symptoms       Plan:     Encouraged him to use MiraLAX daily.    "

## 2017-10-27 NOTE — TELEPHONE ENCOUNTER
----- Message from Celena Abrams sent at 10/26/2017  5:15 PM CDT -----  Contact: Self  Good evening,    Pt called to inform the staff she has the magnesium and she completed the urine sample.    Pt can be reached at [phone].    Thank you!

## 2017-10-27 NOTE — TELEPHONE ENCOUNTER
Called patient with urine results he verbalized understanding. Complaints of constipation and swollen rectum. Appointment scheduled.

## 2017-10-28 LAB — BACTERIA UR CULT: NO GROWTH

## 2018-02-01 ENCOUNTER — TELEPHONE (OUTPATIENT)
Dept: INTERNAL MEDICINE | Facility: CLINIC | Age: 78
End: 2018-02-01

## 2018-02-01 NOTE — TELEPHONE ENCOUNTER
----- Message from Kim Elliott sent at 2/1/2018  8:39 AM CST -----  Contact: self   Patient would like to consult with nurse regarding diarrhea he has been having constantly since 4:00 am. Please call back at [phone].    Thanks,  Kim Elliott

## 2018-02-01 NOTE — TELEPHONE ENCOUNTER
Patient wants to know what to do about his diarrhea it started about 3 pm last night. Told to take Immodium AD and start with a clear liquid diet.

## 2018-02-02 ENCOUNTER — HOSPITAL ENCOUNTER (EMERGENCY)
Facility: HOSPITAL | Age: 78
Discharge: HOME | End: 2018-02-02
Attending: EMERGENCY MEDICINE
Payer: MEDICARE

## 2018-02-02 VITALS
DIASTOLIC BLOOD PRESSURE: 58 MMHG | TEMPERATURE: 98 F | SYSTOLIC BLOOD PRESSURE: 110 MMHG | OXYGEN SATURATION: 98 % | HEART RATE: 54 BPM | RESPIRATION RATE: 16 BRPM

## 2018-02-02 DIAGNOSIS — R55 NEAR SYNCOPE: ICD-10-CM

## 2018-02-02 DIAGNOSIS — E86.0 DEHYDRATION: Primary | ICD-10-CM

## 2018-02-02 DIAGNOSIS — R42 DIZZINESS: ICD-10-CM

## 2018-02-02 LAB
ALBUMIN SERPL BCP-MCNC: 3.7 G/DL
ALP SERPL-CCNC: 49 U/L
ALT SERPL W/O P-5'-P-CCNC: 21 U/L
ANION GAP SERPL CALC-SCNC: 12 MMOL/L
APTT BLDCRRT: 22.1 SEC
AST SERPL-CCNC: 22 U/L
BASOPHILS # BLD AUTO: 0.04 K/UL
BASOPHILS NFR BLD: 0.7 %
BILIRUB SERPL-MCNC: 0.8 MG/DL
BILIRUB UR QL STRIP: NEGATIVE
BUN SERPL-MCNC: 24 MG/DL
CALCIUM SERPL-MCNC: 9.5 MG/DL
CHLORIDE SERPL-SCNC: 104 MMOL/L
CLARITY UR: CLEAR
CO2 SERPL-SCNC: 21 MMOL/L
COLOR UR: YELLOW
CREAT SERPL-MCNC: 1.5 MG/DL
DIFFERENTIAL METHOD: ABNORMAL
EOSINOPHIL # BLD AUTO: 0 K/UL
EOSINOPHIL NFR BLD: 0.5 %
ERYTHROCYTE [DISTWIDTH] IN BLOOD BY AUTOMATED COUNT: 13.3 %
EST. GFR  (AFRICAN AMERICAN): 51 ML/MIN/1.73 M^2
EST. GFR  (NON AFRICAN AMERICAN): 44 ML/MIN/1.73 M^2
GLUCOSE SERPL-MCNC: 133 MG/DL
GLUCOSE UR QL STRIP: NEGATIVE
HCT VFR BLD AUTO: 44.4 %
HGB BLD-MCNC: 15.4 G/DL
HGB UR QL STRIP: NEGATIVE
INR PPP: 1.1
KETONES UR QL STRIP: ABNORMAL
LEUKOCYTE ESTERASE UR QL STRIP: NEGATIVE
LYMPHOCYTES # BLD AUTO: 0.5 K/UL
LYMPHOCYTES NFR BLD: 9 %
MCH RBC QN AUTO: 31.8 PG
MCHC RBC AUTO-ENTMCNC: 34.7 G/DL
MCV RBC AUTO: 92 FL
MONOCYTES # BLD AUTO: 0.7 K/UL
MONOCYTES NFR BLD: 12.8 %
NEUTROPHILS # BLD AUTO: 4.4 K/UL
NEUTROPHILS NFR BLD: 77.9 %
NITRITE UR QL STRIP: NEGATIVE
PH UR STRIP: 6 [PH] (ref 5–8)
PLATELET # BLD AUTO: 126 K/UL
PMV BLD AUTO: 10.6 FL
POTASSIUM SERPL-SCNC: 3.9 MMOL/L
PROT SERPL-MCNC: 7.2 G/DL
PROT UR QL STRIP: NEGATIVE
PROTHROMBIN TIME: 11.4 SEC
RBC # BLD AUTO: 4.85 M/UL
SODIUM SERPL-SCNC: 137 MMOL/L
SP GR UR STRIP: 1.02 (ref 1–1.03)
TROPONIN I SERPL DL<=0.01 NG/ML-MCNC: <0.006 NG/ML
URN SPEC COLLECT METH UR: ABNORMAL
UROBILINOGEN UR STRIP-ACNC: NEGATIVE EU/DL
WBC # BLD AUTO: 5.76 K/UL

## 2018-02-02 PROCEDURE — 25000003 PHARM REV CODE 250: Performed by: EMERGENCY MEDICINE

## 2018-02-02 PROCEDURE — 99285 EMERGENCY DEPT VISIT HI MDM: CPT | Mod: 25

## 2018-02-02 PROCEDURE — 63600175 PHARM REV CODE 636 W HCPCS: Performed by: EMERGENCY MEDICINE

## 2018-02-02 PROCEDURE — 84484 ASSAY OF TROPONIN QUANT: CPT

## 2018-02-02 PROCEDURE — 93005 ELECTROCARDIOGRAM TRACING: CPT

## 2018-02-02 PROCEDURE — 96361 HYDRATE IV INFUSION ADD-ON: CPT

## 2018-02-02 PROCEDURE — 96374 THER/PROPH/DIAG INJ IV PUSH: CPT

## 2018-02-02 PROCEDURE — 85610 PROTHROMBIN TIME: CPT

## 2018-02-02 PROCEDURE — 85025 COMPLETE CBC W/AUTO DIFF WBC: CPT

## 2018-02-02 PROCEDURE — 80053 COMPREHEN METABOLIC PANEL: CPT

## 2018-02-02 PROCEDURE — 85730 THROMBOPLASTIN TIME PARTIAL: CPT

## 2018-02-02 PROCEDURE — 81003 URINALYSIS AUTO W/O SCOPE: CPT

## 2018-02-02 RX ORDER — DICYCLOMINE HYDROCHLORIDE 20 MG/1
20 TABLET ORAL 2 TIMES DAILY
Qty: 20 TABLET | Refills: 0 | Status: SHIPPED | OUTPATIENT
Start: 2018-02-02 | End: 2018-03-04

## 2018-02-02 RX ORDER — ONDANSETRON 2 MG/ML
4 INJECTION INTRAMUSCULAR; INTRAVENOUS
Status: COMPLETED | OUTPATIENT
Start: 2018-02-02 | End: 2018-02-02

## 2018-02-02 RX ORDER — ASPIRIN 81 MG/1
81 TABLET ORAL DAILY
COMMUNITY
End: 2022-10-11

## 2018-02-02 RX ORDER — SODIUM CHLORIDE 9 MG/ML
1000 INJECTION, SOLUTION INTRAVENOUS
Status: COMPLETED | OUTPATIENT
Start: 2018-02-02 | End: 2018-02-02

## 2018-02-02 RX ORDER — ONDANSETRON 4 MG/1
4 TABLET, ORALLY DISINTEGRATING ORAL EVERY 8 HOURS PRN
Qty: 12 TABLET | Refills: 0 | Status: SHIPPED | OUTPATIENT
Start: 2018-02-02 | End: 2019-05-20

## 2018-02-02 RX ADMIN — SODIUM CHLORIDE 1000 ML: 0.9 INJECTION, SOLUTION INTRAVENOUS at 02:02

## 2018-02-02 RX ADMIN — ONDANSETRON 4 MG: 2 INJECTION INTRAMUSCULAR; INTRAVENOUS at 02:02

## 2018-02-02 RX ADMIN — SODIUM CHLORIDE 1000 ML: 900 INJECTION, SOLUTION INTRAVENOUS at 03:02

## 2018-02-02 NOTE — ED PROVIDER NOTES
SCRIBE #1 NOTE: I, Murphy Sampson, am scribing for, and in the presence of, Isacc Chen Jr., MD. I have scribed the entire note.      History      Chief Complaint   Patient presents with    Dizziness     dizziness, near syncope       Review of patient's allergies indicates:   Allergen Reactions    Codeine         HPI   HPI    2/2/2018, 12:54 PM   History obtained from the patient      History of Present Illness: Bi Jessica is a 77 y.o. male patient who presents to the Emergency Department for an evaluation of dizziness which onset gradually x3 days ago. Pt denies any recent abx use. Symptoms are constant and moderate in severity. Exacerbated by movement and relieved by nothing. Associated sxs include near syncopal episode, nausea, emesis, and diarrhea. Patient denies any fall, head trauma, abd pain, constipation, HA, weakness/numbness, visual disturbance/photophobia, and all other sxs at this time. No further complaints or concerns at this time.       Arrival mode: EMS    PCP: Thee Zarate MD       Past Medical History:  Past Medical History:   Diagnosis Date    Anxiety     BPH with obstruction/lower urinary tract symptoms     Depression     Diabetes mellitus type II, controlled     Hyperlipidemia associated with type 2 diabetes mellitus     Hypertension associated with diabetes        Past Surgical History:  Past Surgical History:   Procedure Laterality Date    COLONOSCOPY N/A 5/1/2017    Procedure: COLONOSCOPY;  Surgeon: Vishal Andrade MD;  Location: [facility];  Service: Endoscopy;  Laterality: N/A;    TONSILLECTOMY           Family History:  History reviewed. No pertinent family history.    Social History:  Social History     Social History Main Topics    Smoking status: Former Smoker    Smokeless tobacco: Unknown    Alcohol use 0.6 oz/week     1 Glasses of wine per week    Drug use: No    Sexual activity: No       ROS   Review of Systems   Constitutional: Negative for chills, diaphoresis  and fever.   HENT: Negative for congestion, sinus pressure, sore throat and trouble swallowing.    Eyes: Negative for photophobia and visual disturbance.   Respiratory: Negative for cough and shortness of breath.    Cardiovascular: Negative for chest pain.   Gastrointestinal: Positive for diarrhea, nausea and vomiting. Negative for abdominal pain, blood in stool and constipation.   Genitourinary: Negative for dysuria, frequency, hematuria and urgency.   Musculoskeletal: Negative for back pain, neck pain and neck stiffness.   Skin: Negative for rash.   Neurological: Positive for dizziness. Negative for weakness, light-headedness, numbness and headaches.        (+) Near syncopal episode   Hematological: Does not bruise/bleed easily.     Physical Exam      Initial Vitals   BP Pulse Resp Temp SpO2   -- -- -- -- --      MAP       --          Physical Exam  Nursing Notes and Vital Signs Reviewed.  Constitutional: Patient is in no acute distress. Well-developed and well-nourished.  Head: Atraumatic. Normocephalic.  Eyes: PERRL. EOM intact. Conjunctivae are not pale. No scleral icterus. No nystagmus.  ENT: Mucous membranes are dry. Oropharynx is clear and symmetric.    Neck: Supple. Full ROM. No lymphadenopathy.  Cardiovascular: Tachycardic. Regular rhythm.  Pulmonary/Chest: No respiratory distress. Clear to auscultation bilaterally. No wheezing or rales.  Abdominal: Soft and non-distended.  There is no tenderness.  Musculoskeletal: Moves all extremities. No obvious deformities. No edema. No calf tenderness.  Skin: Warm and dry.  Neurological:  Alert, awake, and appropriate.  Normal speech.  No acute focal neurological deficits are appreciated.  Psychiatric: Normal affect. Good eye contact. Appropriate in content.    ED Course    Procedures  ED Vital Signs:  Vitals:    02/02/18 1257 02/02/18 1303 02/02/18 1305 02/02/18 1315   BP: 106/66  (!) 117/57 (!) 114/58   Pulse: (!) 59 61 (!) 55 (!) 50   Resp: 20  13 11   Temp: 97.9  °F (36.6 °C)      TempSrc: Oral      SpO2: 95%  100% 100%    02/02/18 1400 02/02/18 1500 02/02/18 1600   BP: 113/64 106/60 (!) 121/58   Pulse: (!) 56 (!) 54 (!) 54   Resp: 20 20 (!) 21   Temp:      TempSrc:      SpO2: 97% 99% 99%       Abnormal Lab Results:  Labs Reviewed   CBC W/ AUTO DIFFERENTIAL - Abnormal; Notable for the following:        Result Value    MCH 31.8 (*)     Platelets 126 (*)     Lymph # 0.5 (*)     Gran% 77.9 (*)     Lymph% 9.0 (*)     All other components within normal limits   COMPREHENSIVE METABOLIC PANEL - Abnormal; Notable for the following:     CO2 21 (*)     Glucose 133 (*)     BUN, Bld 24 (*)     Creatinine 1.5 (*)     Alkaline Phosphatase 49 (*)     eGFR if  51 (*)     eGFR if non  44 (*)     All other components within normal limits   URINALYSIS - Abnormal; Notable for the following:     Ketones, UA 1+ (*)     All other components within normal limits   CLOSTRIDIUM DIFFICILE   CULTURE, STOOL   PROTIME-INR   APTT   TROPONIN I        All Lab Results:  Results for orders placed or performed during the hospital encounter of 02/02/18   CBC auto differential   Result Value Ref Range    WBC 5.76 3.90 - 12.70 K/uL    RBC 4.85 4.60 - 6.20 M/uL    Hemoglobin 15.4 14.0 - 18.0 g/dL    Hematocrit 44.4 40.0 - 54.0 %    MCV 92 82 - 98 fL    MCH 31.8 (H) 27.0 - 31.0 pg    MCHC 34.7 32.0 - 36.0 g/dL    RDW 13.3 11.5 - 14.5 %    Platelets 126 (L) 150 - 350 K/uL    MPV 10.6 9.2 - 12.9 fL    Gran # (ANC) 4.4 1.8 - 7.7 K/uL    Lymph # 0.5 (L) 1.0 - 4.8 K/uL    Mono # 0.7 0.3 - 1.0 K/uL    Eos # 0.0 0.0 - 0.5 K/uL    Baso # 0.04 0.00 - 0.20 K/uL    Gran% 77.9 (H) 38.0 - 73.0 %    Lymph% 9.0 (L) 18.0 - 48.0 %    Mono% 12.8 4.0 - 15.0 %    Eosinophil% 0.5 0.0 - 8.0 %    Basophil% 0.7 0.0 - 1.9 %    Differential Method Automated    Comprehensive metabolic panel   Result Value Ref Range    Sodium 137 136 - 145 mmol/L    Potassium 3.9 3.5 - 5.1 mmol/L    Chloride 104 95 - 110 mmol/L     CO2 21 (L) 23 - 29 mmol/L    Glucose 133 (H) 70 - 110 mg/dL    BUN, Bld 24 (H) 8 - 23 mg/dL    Creatinine 1.5 (H) 0.5 - 1.4 mg/dL    Calcium 9.5 8.7 - 10.5 mg/dL    Total Protein 7.2 6.0 - 8.4 g/dL    Albumin 3.7 3.5 - 5.2 g/dL    Total Bilirubin 0.8 0.1 - 1.0 mg/dL    Alkaline Phosphatase 49 (L) 55 - 135 U/L    AST 22 10 - 40 U/L    ALT 21 10 - 44 U/L    Anion Gap 12 8 - 16 mmol/L    eGFR if African American 51 (A) >60 mL/min/1.73 m^2    eGFR if non African American 44 (A) >60 mL/min/1.73 m^2   Protime-INR   Result Value Ref Range    Prothrombin Time 11.4 9.0 - 12.5 sec    INR 1.1 0.8 - 1.2   APTT   Result Value Ref Range    aPTT 22.1 21.0 - 32.0 sec   Troponin I   Result Value Ref Range    Troponin I <0.006 0.000 - 0.026 ng/mL   Urinalysis   Result Value Ref Range    Specimen UA Urine, Clean Catch     Color, UA Yellow Yellow, Straw, Bria    Appearance, UA Clear Clear    pH, UA 6.0 5.0 - 8.0    Specific Gravity, UA 1.025 1.005 - 1.030    Protein, UA Negative Negative    Glucose, UA Negative Negative    Ketones, UA 1+ (A) Negative    Bilirubin (UA) Negative Negative    Occult Blood UA Negative Negative    Nitrite, UA Negative Negative    Urobilinogen, UA Negative <2.0 EU/dL    Leukocytes, UA Negative Negative         Imaging Results:  Imaging Results          X-Ray Chest AP Portable (Final result)  Result time 02/02/18 14:06:04    Final result by Derek Mansfield Jr., MD (02/02/18 14:06:04)                 Impression:     No acute cardiopulmonary disease.  Cardiomegaly.      Electronically signed by: DEREK MANSFIELD  Date:     02/02/18  Time:    14:06              Narrative:    Exam: Portable chest radiograph    History:    Weakness.    Findings:      The lungs are clear. The cardiac silhouette is mildly enlarged.  Tortuous thoracic aorta.  No effusion or pneumothorax.  Calcified aortic knob.  Mediastinum unremarkable.  The remaining osseous structures and soft tissues are within normal limits.                                     The EKG was ordered, reviewed, and independently interpreted by the ED provider.  Interpretation time: 13:03  Rate: 58 BPM  Rhythm: Sinus bradychardia with 1st degree AV block  Interpretation: No STEMI.        The Emergency Provider reviewed the vital signs and test results, which are outlined above.    ED Discussion     4:37 PM: Reassessed pt at this time. Pt is awake, alert, and in NAD. Pt states his condition has improved at this time. Discussed with pt all pertinent ED information and results. Discussed pt dx of dehydration and plan of tx. Gave pt all f/u and return to the ED instructions. All questions and concerns were addressed at this time. Pt expresses understanding of information and instructions, and is comfortable with plan to discharge. Pt is stable for discharge.    I discussed with patient and/or family/caretaker that evaluation in the ED does not suggest any emergent or life threatening medical conditions requiring immediate intervention beyond what was provided in the ED, and I believe patient is safe for discharge.  Regardless, an unremarkable evaluation in the ED does not preclude the development or presence of a serious of life threatening condition. As such, patient was instructed to return immediately for any worsening or change in current symptoms.      ED Medication(s):  Medications   ondansetron injection 4 mg (4 mg Intravenous Given 2/2/18 1417)   0.9%  NaCl infusion (0 mLs Intravenous Stopped 2/2/18 1530)   0.9%  NaCl infusion (1,000 mLs Intravenous New Bag 2/2/18 1530)       New Prescriptions    DICYCLOMINE (BENTYL) 20 MG TABLET    Take 1 tablet (20 mg total) by mouth 2 (two) times daily. Prn abdominal cramping    ONDANSETRON (ZOFRAN-ODT) 4 MG TBDL    Take 1 tablet (4 mg total) by mouth every 8 (eight) hours as needed (tid prn nausea).       Follow-up Information     Thee Zarate MD. Call in 3 days.    Specialty:  Internal Medicine  Why:  to schedule appt for  recheck  Contact information:  [address]  [address]  [place] [address]  [phone]             [facility] - .    Specialty:  Emergency Medicine  Why:  if symptoms worsen or perists or if you develop blood in stools or increasing abdominal pain  Contact information:  [address]  [facility] [address] [phone]                   Medical Decision Making    Medical Decision Making:   Clinical Tests:   Lab Tests: Ordered and Reviewed  Radiological Study: Ordered and Reviewed  Medical Tests: Ordered and Reviewed           Scribe Attestation:   Scribe #1: I performed the above scribed service and the documentation accurately describes the services I performed. I attest to the accuracy of the note.    Attending:   Physician Attestation Statement for Scribe #1: I, Isacc Chen Jr., MD, personally performed the services described in this documentation, as scribed by Murphy Braden, in my presence, and it is both accurate and complete.          Clinical Impression       ICD-10-CM ICD-9-CM   1. Dehydration E86.0 276.51   2. Dizziness R42 780.4   3. Near syncope R55 780.2       Disposition:   Disposition: Discharged  Condition: Stable         Iascc Chen Jr., MD  02/02/18 4628

## 2018-02-05 ENCOUNTER — TELEPHONE (OUTPATIENT)
Dept: INTERNAL MEDICINE | Facility: CLINIC | Age: 78
End: 2018-02-05

## 2018-02-05 NOTE — TELEPHONE ENCOUNTER
----- Message from Maxi Shah sent at 2/5/2018 10:22 AM CST -----  Contact: Pt  Please give pt a call at ...[phone] (home) regarding some concerns he have

## 2018-02-06 ENCOUNTER — OFFICE VISIT (OUTPATIENT)
Dept: INTERNAL MEDICINE | Facility: CLINIC | Age: 78
End: 2018-02-06
Payer: MEDICARE

## 2018-02-06 VITALS
HEIGHT: 69 IN | RESPIRATION RATE: 16 BRPM | WEIGHT: 207 LBS | OXYGEN SATURATION: 98 % | TEMPERATURE: 98 F | DIASTOLIC BLOOD PRESSURE: 70 MMHG | SYSTOLIC BLOOD PRESSURE: 110 MMHG | BODY MASS INDEX: 30.66 KG/M2 | HEART RATE: 78 BPM

## 2018-02-06 DIAGNOSIS — A08.4 GASTROENTERITIS AND COLITIS, VIRAL: Primary | ICD-10-CM

## 2018-02-06 PROCEDURE — 1159F MED LIST DOCD IN RCRD: CPT | Mod: ,,, | Performed by: INTERNAL MEDICINE

## 2018-02-06 PROCEDURE — 99213 OFFICE O/P EST LOW 20 MIN: CPT | Mod: PBBFAC,PO | Performed by: INTERNAL MEDICINE

## 2018-02-06 PROCEDURE — 99213 OFFICE O/P EST LOW 20 MIN: CPT | Mod: S$PBB,,, | Performed by: INTERNAL MEDICINE

## 2018-02-06 PROCEDURE — 1126F AMNT PAIN NOTED NONE PRSNT: CPT | Mod: ,,, | Performed by: INTERNAL MEDICINE

## 2018-02-06 PROCEDURE — 99999 PR PBB SHADOW E&M-EST. PATIENT-LVL III: CPT | Mod: PBBFAC,,, | Performed by: INTERNAL MEDICINE

## 2018-02-06 NOTE — PROGRESS NOTES
"HPI:  Patient is a 69-year-old man who comes in today for ER follow-up.  He was seen in the ER for lightheadedness and dizziness.  He was dehydrated after having acute gastrointestinal virus with diarrhea.  He was given 2 L of fluids.  That was 3 days ago.  He is doing well.  He's had no more diarrhea.    Current meds have been verified and updated per the EMR  Exam:/70   Pulse 78   Temp 98 °F (36.7 °C)   Resp 16   Ht 5' 9" (1.753 m)   Wt 93.9 kg (207 lb 0.2 oz)   SpO2 98%   BMI 30.57 kg/m²   Chest clear  Cardiovascular regular rate and rhythm without murmur, gallop or rub  Abdomen soft, benign, no rebound no guarding, no distention.  Bowel sounds are normal    Lab Results   Component Value Date    WBC 5.76 02/02/2018    HGB 15.4 02/02/2018    HCT 44.4 02/02/2018     (L) 02/02/2018    CHOL 177 10/10/2017    TRIG 84 10/10/2017    HDL 59 10/10/2017    ALT 21 02/02/2018    AST 22 02/02/2018     02/02/2018    K 3.9 02/02/2018     02/02/2018    CREATININE 1.5 (H) 02/02/2018    BUN 24 (H) 02/02/2018    CO2 21 (L) 02/02/2018    TSH 1.767 10/10/2017    PSA 2.9 10/10/2017    INR 1.1 02/02/2018    HGBA1C 5.6 10/10/2017       Impression:  Acute gastrointestinal virus, resolved  Patient Active Problem List   Diagnosis    Anxiety    Depression    Hyperlipidemia associated with type 2 diabetes mellitus    Hypertension associated with diabetes    Diabetes mellitus type II, controlled    Left inguinal hernia    History of colon polyps    BPH with obstruction/lower urinary tract symptoms       Plan:     Patient will see me at his regular scheduled appointment.  His medications remain the same    "

## 2018-04-05 ENCOUNTER — PATIENT OUTREACH (OUTPATIENT)
Dept: ADMINISTRATIVE | Facility: HOSPITAL | Age: 78
End: 2018-04-05

## 2018-04-05 NOTE — PROGRESS NOTES
Chart audit completed.  Offered to schedule annual eye exam. He declined and said he would call Dr JOVITA Sutherland himself and schedule.

## 2018-04-16 ENCOUNTER — LAB VISIT (OUTPATIENT)
Dept: LAB | Facility: HOSPITAL | Age: 78
End: 2018-04-16
Attending: INTERNAL MEDICINE
Payer: MEDICARE

## 2018-04-16 DIAGNOSIS — E11.9 CONTROLLED TYPE 2 DIABETES MELLITUS WITHOUT COMPLICATION, WITHOUT LONG-TERM CURRENT USE OF INSULIN: ICD-10-CM

## 2018-04-16 LAB
ALBUMIN SERPL BCP-MCNC: 3.7 G/DL
ALP SERPL-CCNC: 47 U/L
ALT SERPL W/O P-5'-P-CCNC: 17 U/L
ANION GAP SERPL CALC-SCNC: 10 MMOL/L
AST SERPL-CCNC: 20 U/L
BILIRUB SERPL-MCNC: 0.6 MG/DL
BUN SERPL-MCNC: 30 MG/DL
CALCIUM SERPL-MCNC: 10.3 MG/DL
CHLORIDE SERPL-SCNC: 106 MMOL/L
CHOLEST SERPL-MCNC: 156 MG/DL
CHOLEST/HDLC SERPL: 2.8 {RATIO}
CO2 SERPL-SCNC: 24 MMOL/L
CREAT SERPL-MCNC: 1.1 MG/DL
EST. GFR  (AFRICAN AMERICAN): >60 ML/MIN/1.73 M^2
EST. GFR  (NON AFRICAN AMERICAN): >60 ML/MIN/1.73 M^2
GLUCOSE SERPL-MCNC: 105 MG/DL
HDLC SERPL-MCNC: 55 MG/DL
HDLC SERPL: 35.3 %
LDLC SERPL CALC-MCNC: 89.2 MG/DL
NONHDLC SERPL-MCNC: 101 MG/DL
POTASSIUM SERPL-SCNC: 4.3 MMOL/L
PROT SERPL-MCNC: 7.1 G/DL
SODIUM SERPL-SCNC: 140 MMOL/L
TRIGL SERPL-MCNC: 59 MG/DL
TSH SERPL DL<=0.005 MIU/L-ACNC: 1.68 UIU/ML

## 2018-04-16 PROCEDURE — 80061 LIPID PANEL: CPT

## 2018-04-16 PROCEDURE — 84443 ASSAY THYROID STIM HORMONE: CPT

## 2018-04-16 PROCEDURE — 83036 HEMOGLOBIN GLYCOSYLATED A1C: CPT

## 2018-04-16 PROCEDURE — 36415 COLL VENOUS BLD VENIPUNCTURE: CPT | Mod: PO

## 2018-04-16 PROCEDURE — 80053 COMPREHEN METABOLIC PANEL: CPT

## 2018-04-17 LAB
ESTIMATED AVG GLUCOSE: 114 MG/DL
HBA1C MFR BLD HPLC: 5.6 %

## 2018-05-03 ENCOUNTER — OFFICE VISIT (OUTPATIENT)
Dept: INTERNAL MEDICINE | Facility: CLINIC | Age: 78
End: 2018-05-03
Payer: MEDICARE

## 2018-05-03 VITALS
HEART RATE: 54 BPM | HEIGHT: 71 IN | BODY MASS INDEX: 30.06 KG/M2 | DIASTOLIC BLOOD PRESSURE: 80 MMHG | OXYGEN SATURATION: 98 % | WEIGHT: 214.75 LBS | TEMPERATURE: 97 F | RESPIRATION RATE: 16 BRPM | SYSTOLIC BLOOD PRESSURE: 130 MMHG

## 2018-05-03 DIAGNOSIS — I15.2 HYPERTENSION ASSOCIATED WITH DIABETES: ICD-10-CM

## 2018-05-03 DIAGNOSIS — E78.5 HYPERLIPIDEMIA ASSOCIATED WITH TYPE 2 DIABETES MELLITUS: ICD-10-CM

## 2018-05-03 DIAGNOSIS — E11.59 HYPERTENSION ASSOCIATED WITH DIABETES: ICD-10-CM

## 2018-05-03 DIAGNOSIS — E11.9 CONTROLLED TYPE 2 DIABETES MELLITUS WITHOUT COMPLICATION, WITHOUT LONG-TERM CURRENT USE OF INSULIN: Primary | ICD-10-CM

## 2018-05-03 DIAGNOSIS — E11.69 HYPERLIPIDEMIA ASSOCIATED WITH TYPE 2 DIABETES MELLITUS: ICD-10-CM

## 2018-05-03 PROCEDURE — 99213 OFFICE O/P EST LOW 20 MIN: CPT | Mod: PBBFAC,PO | Performed by: INTERNAL MEDICINE

## 2018-05-03 PROCEDURE — 99213 OFFICE O/P EST LOW 20 MIN: CPT | Mod: S$PBB,,, | Performed by: INTERNAL MEDICINE

## 2018-05-03 PROCEDURE — 99999 PR PBB SHADOW E&M-EST. PATIENT-LVL III: CPT | Mod: PBBFAC,,, | Performed by: INTERNAL MEDICINE

## 2018-05-03 NOTE — PROGRESS NOTES
"HPI:  Patient is 78-year-old man who comes in today for follow-up of his hypertension, lipids and diabetes.  He's been doing excellent.  He has no complaints at all.  He continues to work full-time.    Current meds have been verified and updated per the EMR  Exam:/80   Pulse (!) 54   Temp 96.5 °F (35.8 °C)   Resp 16   Ht 5' 11" (1.803 m)   Wt 97.4 kg (214 lb 11.7 oz)   SpO2 98%   BMI 29.95 kg/m²   Exam deferred    Lab Results   Component Value Date    WBC 5.76 02/02/2018    HGB 15.4 02/02/2018    HCT 44.4 02/02/2018     (L) 02/02/2018    CHOL 156 04/16/2018    TRIG 59 04/16/2018    HDL 55 04/16/2018    ALT 17 04/16/2018    AST 20 04/16/2018     04/16/2018    K 4.3 04/16/2018     04/16/2018    CREATININE 1.1 04/16/2018    BUN 30 (H) 04/16/2018    CO2 24 04/16/2018    TSH 1.676 04/16/2018    PSA 2.9 10/10/2017    INR 1.1 02/02/2018    HGBA1C 5.6 04/16/2018       Impression:  Multiple medical problems below, stable  Patient Active Problem List   Diagnosis    Anxiety    Depression    Hyperlipidemia associated with type 2 diabetes mellitus    Hypertension associated with diabetes    Diabetes mellitus type II, controlled    Left inguinal hernia    History of colon polyps    BPH with obstruction/lower urinary tract symptoms       Plan:  Orders Placed This Encounter    Hemoglobin A1c    Comprehensive metabolic panel    Lipid panel     Patient will see me again in 6 months with above lab work.  Medications remain the same    "

## 2018-06-29 RX ORDER — LISINOPRIL AND HYDROCHLOROTHIAZIDE 12.5; 2 MG/1; MG/1
TABLET ORAL
Qty: 90 TABLET | Refills: 3 | Status: ON HOLD | OUTPATIENT
Start: 2018-06-29 | End: 2019-01-25 | Stop reason: HOSPADM

## 2018-06-29 RX ORDER — METFORMIN HYDROCHLORIDE 850 MG/1
TABLET ORAL
Qty: 90 TABLET | Refills: 3 | Status: SHIPPED | OUTPATIENT
Start: 2018-06-29 | End: 2019-06-08 | Stop reason: SDUPTHER

## 2018-06-29 RX ORDER — SIMVASTATIN 40 MG/1
TABLET, FILM COATED ORAL
Qty: 90 TABLET | Refills: 3 | Status: SHIPPED | OUTPATIENT
Start: 2018-06-29 | End: 2019-06-08 | Stop reason: SDUPTHER

## 2018-11-05 ENCOUNTER — LAB VISIT (OUTPATIENT)
Dept: LAB | Facility: HOSPITAL | Age: 78
End: 2018-11-05
Attending: INTERNAL MEDICINE
Payer: MEDICARE

## 2018-11-05 DIAGNOSIS — E11.9 CONTROLLED TYPE 2 DIABETES MELLITUS WITHOUT COMPLICATION, WITHOUT LONG-TERM CURRENT USE OF INSULIN: ICD-10-CM

## 2018-11-05 LAB
ALBUMIN SERPL BCP-MCNC: 3.6 G/DL
ALP SERPL-CCNC: 43 U/L
ALT SERPL W/O P-5'-P-CCNC: 19 U/L
ANION GAP SERPL CALC-SCNC: 8 MMOL/L
AST SERPL-CCNC: 22 U/L
BILIRUB SERPL-MCNC: 0.6 MG/DL
BUN SERPL-MCNC: 30 MG/DL
CALCIUM SERPL-MCNC: 9.7 MG/DL
CHLORIDE SERPL-SCNC: 104 MMOL/L
CHOLEST SERPL-MCNC: 156 MG/DL
CHOLEST/HDLC SERPL: 3.1 {RATIO}
CO2 SERPL-SCNC: 26 MMOL/L
CREAT SERPL-MCNC: 1.2 MG/DL
EST. GFR  (AFRICAN AMERICAN): >60 ML/MIN/1.73 M^2
EST. GFR  (NON AFRICAN AMERICAN): 57.2 ML/MIN/1.73 M^2
ESTIMATED AVG GLUCOSE: 117 MG/DL
GLUCOSE SERPL-MCNC: 98 MG/DL
HBA1C MFR BLD HPLC: 5.7 %
HDLC SERPL-MCNC: 51 MG/DL
HDLC SERPL: 32.7 %
LDLC SERPL CALC-MCNC: 90 MG/DL
NONHDLC SERPL-MCNC: 105 MG/DL
POTASSIUM SERPL-SCNC: 4.1 MMOL/L
PROT SERPL-MCNC: 7.3 G/DL
SODIUM SERPL-SCNC: 138 MMOL/L
TRIGL SERPL-MCNC: 75 MG/DL

## 2018-11-05 PROCEDURE — 36415 COLL VENOUS BLD VENIPUNCTURE: CPT | Mod: PO

## 2018-11-05 PROCEDURE — 80053 COMPREHEN METABOLIC PANEL: CPT

## 2018-11-05 PROCEDURE — 83036 HEMOGLOBIN GLYCOSYLATED A1C: CPT

## 2018-11-05 PROCEDURE — 80061 LIPID PANEL: CPT

## 2018-11-13 ENCOUNTER — OFFICE VISIT (OUTPATIENT)
Dept: INTERNAL MEDICINE | Facility: CLINIC | Age: 78
End: 2018-11-13
Payer: MEDICARE

## 2018-11-13 VITALS
HEART RATE: 60 BPM | HEIGHT: 71 IN | DIASTOLIC BLOOD PRESSURE: 70 MMHG | WEIGHT: 215.38 LBS | SYSTOLIC BLOOD PRESSURE: 104 MMHG | OXYGEN SATURATION: 98 % | BODY MASS INDEX: 30.15 KG/M2 | RESPIRATION RATE: 16 BRPM | TEMPERATURE: 98 F

## 2018-11-13 DIAGNOSIS — E78.5 HYPERLIPIDEMIA ASSOCIATED WITH TYPE 2 DIABETES MELLITUS: Primary | ICD-10-CM

## 2018-11-13 DIAGNOSIS — E11.9 CONTROLLED TYPE 2 DIABETES MELLITUS WITHOUT COMPLICATION, WITHOUT LONG-TERM CURRENT USE OF INSULIN: ICD-10-CM

## 2018-11-13 DIAGNOSIS — K40.90 LEFT INGUINAL HERNIA: ICD-10-CM

## 2018-11-13 DIAGNOSIS — N13.8 BPH WITH OBSTRUCTION/LOWER URINARY TRACT SYMPTOMS: ICD-10-CM

## 2018-11-13 DIAGNOSIS — Z86.010 HISTORY OF COLON POLYPS: ICD-10-CM

## 2018-11-13 DIAGNOSIS — F41.9 ANXIETY: ICD-10-CM

## 2018-11-13 DIAGNOSIS — F32.A DEPRESSION, UNSPECIFIED DEPRESSION TYPE: ICD-10-CM

## 2018-11-13 DIAGNOSIS — E11.69 HYPERLIPIDEMIA ASSOCIATED WITH TYPE 2 DIABETES MELLITUS: Primary | ICD-10-CM

## 2018-11-13 DIAGNOSIS — N40.1 BPH WITH OBSTRUCTION/LOWER URINARY TRACT SYMPTOMS: ICD-10-CM

## 2018-11-13 DIAGNOSIS — I15.2 HYPERTENSION ASSOCIATED WITH DIABETES: ICD-10-CM

## 2018-11-13 DIAGNOSIS — E11.59 HYPERTENSION ASSOCIATED WITH DIABETES: ICD-10-CM

## 2018-11-13 PROCEDURE — 99214 OFFICE O/P EST MOD 30 MIN: CPT | Mod: 25,S$PBB,, | Performed by: INTERNAL MEDICINE

## 2018-11-13 PROCEDURE — 99999 PR PBB SHADOW E&M-EST. PATIENT-LVL IV: CPT | Mod: PBBFAC,,, | Performed by: INTERNAL MEDICINE

## 2018-11-13 PROCEDURE — 90662 IIV NO PRSV INCREASED AG IM: CPT | Mod: PBBFAC,PO

## 2018-11-13 PROCEDURE — 99214 OFFICE O/P EST MOD 30 MIN: CPT | Mod: PBBFAC,PO,25 | Performed by: INTERNAL MEDICINE

## 2018-11-13 NOTE — PROGRESS NOTES
HPI:  Patient is a 78-year-old man who comes today for follow-up of diabetes, hypertension, lipids, and for his annual physical exam.  He has been doing well.  He has had no hypoglycemic problems.  His blood pressures been well controlled.  He continues to work full-time at [employer].      Current MEDS: medcard review, verified and update  Allergies: Per the electronic medical record    Past Medical History:   Diagnosis Date    Anxiety     BPH with obstruction/lower urinary tract symptoms     Depression     Diabetes mellitus type II, controlled     Hyperlipidemia associated with type 2 diabetes mellitus     Hypertension associated with diabetes        Past Surgical History:   Procedure Laterality Date    COLONOSCOPY N/A 5/1/2017    Procedure: COLONOSCOPY;  Surgeon: Vishal Andrade MD;  Location: [facility];  Service: Endoscopy;  Laterality: N/A;    COLONOSCOPY N/A 5/1/2017    Performed by Vishal Andrade [facility] at [facility] ENDO    TONSILLECTOMY         SHx: per the electronic medical record    FHx: recorded in the electronic medical record    ROS:    denies any chest pains or shortness of breath. Denies any nausea, vomiting or diarrhea. Denies any fever, chills or sweats. Denies any change in weight, voice, stool, skin or hair. Denies any dysuria, dyspepsia or dysphagia. Denies any change in vision, hearing or headaches. Denies any swollen lymph nodes or loss of memory.    PE:  There were no vitals taken for this visit.  Gen: Well-developed, well-nourished, male, in no acute distress, oriented x3  HEENT: neck is supple, no adenopathy, carotids 2+ equal without bruits, thyroid exam normal size without nodules.  CHEST: clear to auscultation and percussion  CVS: regular rate and rhythm without significant murmur, gallop, or rubs  ABD: soft, benign, no esrebound no guarding, no distention.  Bowel sounds are normal.     nontender.  No palpable masses.  No organomegaly and no audible bruits.  RECTAL:  Deferred  EXT: no  clubbing, cyanosis, or edema  LYMPH: no cervical, inguinal, or axillary adenopathy  FEET: no loss of sensation.  No ulcers or pressure sores.  Monofilament testing normal  NEURO: gait normal.  Cranial nerves II- XII intact. No nystagmus.  Speech normal.   Gross motor and sensory unremarkable.    Lab Results   Component Value Date    WBC 5.76 02/02/2018    HGB 15.4 02/02/2018    HCT 44.4 02/02/2018     (L) 02/02/2018    CHOL 156 11/05/2018    TRIG 75 11/05/2018    HDL 51 11/05/2018    ALT 19 11/05/2018    AST 22 11/05/2018     11/05/2018    K 4.1 11/05/2018     11/05/2018    CREATININE 1.2 11/05/2018    BUN 30 (H) 11/05/2018    CO2 26 11/05/2018    TSH 1.676 04/16/2018    PSA 2.9 10/10/2017    INR 1.1 02/02/2018    HGBA1C 5.7 (H) 11/05/2018       Impression:  Multiple medical problems below, stable  Patient Active Problem List   Diagnosis    Anxiety    Depression    Hyperlipidemia associated with type 2 diabetes mellitus    Hypertension associated with diabetes    Diabetes mellitus type II, controlled    Left inguinal hernia    History of colon polyps    BPH with obstruction/lower urinary tract symptoms       Plan:   Orders Placed This Encounter    Hemoglobin A1c    Comprehensive metabolic panel    Lipid panel    Protein / creatinine ratio, urine    TSH    CBC auto differential     Medications remain the same.  He will be seen again in 6 months with above lab work.  He was given high-dose influenza vaccine

## 2019-01-22 ENCOUNTER — HOSPITAL ENCOUNTER (INPATIENT)
Facility: HOSPITAL | Age: 79
LOS: 2 days | Discharge: HOME | DRG: 864 | End: 2019-01-25
Attending: EMERGENCY MEDICINE | Admitting: FAMILY MEDICINE
Payer: MEDICARE

## 2019-01-22 ENCOUNTER — TELEPHONE (OUTPATIENT)
Dept: INTERNAL MEDICINE | Facility: CLINIC | Age: 79
End: 2019-01-22

## 2019-01-22 DIAGNOSIS — I48.91 AF (ATRIAL FIBRILLATION): ICD-10-CM

## 2019-01-22 DIAGNOSIS — I48.0 PAF (PAROXYSMAL ATRIAL FIBRILLATION): ICD-10-CM

## 2019-01-22 DIAGNOSIS — R55 SYNCOPE, UNSPECIFIED SYNCOPE TYPE: Primary | ICD-10-CM

## 2019-01-22 DIAGNOSIS — I48.91 NEW ONSET A-FIB: ICD-10-CM

## 2019-01-22 DIAGNOSIS — I48.91 ATRIAL FIBRILLATION: ICD-10-CM

## 2019-01-22 DIAGNOSIS — R50.9 FEVER OF UNKNOWN ORIGIN (FUO): ICD-10-CM

## 2019-01-22 DIAGNOSIS — I48.91 ATRIAL FIBRILLATION, UNSPECIFIED TYPE: ICD-10-CM

## 2019-01-22 LAB
ALBUMIN SERPL BCP-MCNC: 3.5 G/DL
ALP SERPL-CCNC: 47 U/L
ALT SERPL W/O P-5'-P-CCNC: 19 U/L
ANION GAP SERPL CALC-SCNC: 10 MMOL/L
APTT BLDCRRT: 26.5 SEC
AST SERPL-CCNC: 19 U/L
BASOPHILS # BLD AUTO: 0.02 K/UL
BASOPHILS NFR BLD: 0.2 %
BILIRUB SERPL-MCNC: 1 MG/DL
BUN SERPL-MCNC: 22 MG/DL
CALCIUM SERPL-MCNC: 9.4 MG/DL
CHLORIDE SERPL-SCNC: 107 MMOL/L
CO2 SERPL-SCNC: 22 MMOL/L
CREAT SERPL-MCNC: 1.1 MG/DL
DIFFERENTIAL METHOD: ABNORMAL
EOSINOPHIL # BLD AUTO: 0 K/UL
EOSINOPHIL NFR BLD: 0.1 %
ERYTHROCYTE [DISTWIDTH] IN BLOOD BY AUTOMATED COUNT: 12.7 %
EST. GFR  (AFRICAN AMERICAN): >60 ML/MIN/1.73 M^2
EST. GFR  (NON AFRICAN AMERICAN): >60 ML/MIN/1.73 M^2
ESTIMATED PA SYSTOLIC PRESSURE: 39.48
GLUCOSE SERPL-MCNC: 136 MG/DL
HCT VFR BLD AUTO: 44.1 %
HGB BLD-MCNC: 15.2 G/DL
INR PPP: 1
LYMPHOCYTES # BLD AUTO: 0.8 K/UL
LYMPHOCYTES NFR BLD: 7.3 %
MCH RBC QN AUTO: 31.5 PG
MCHC RBC AUTO-ENTMCNC: 34.5 G/DL
MCV RBC AUTO: 92 FL
MITRAL VALVE MOBILITY: NORMAL
MONOCYTES # BLD AUTO: 0.5 K/UL
MONOCYTES NFR BLD: 5.1 %
NEUTROPHILS # BLD AUTO: 9.3 K/UL
NEUTROPHILS NFR BLD: 87.3 %
PLATELET # BLD AUTO: 176 K/UL
PMV BLD AUTO: 10.1 FL
POTASSIUM SERPL-SCNC: 4.6 MMOL/L
PROT SERPL-MCNC: 6.9 G/DL
PROTHROMBIN TIME: 10.9 SEC
RBC # BLD AUTO: 4.82 M/UL
RETIRED EF AND QEF - SEE NOTES: 55 (ref 55–65)
SODIUM SERPL-SCNC: 139 MMOL/L
TRICUSPID VALVE REGURGITATION: NORMAL
TROPONIN I SERPL DL<=0.01 NG/ML-MCNC: 0.01 NG/ML
WBC # BLD AUTO: 10.68 K/UL

## 2019-01-22 PROCEDURE — 93306 TTE W/DOPPLER COMPLETE: CPT | Mod: 26,,, | Performed by: INTERNAL MEDICINE

## 2019-01-22 PROCEDURE — 80053 COMPREHEN METABOLIC PANEL: CPT

## 2019-01-22 PROCEDURE — 25000003 PHARM REV CODE 250: Performed by: EMERGENCY MEDICINE

## 2019-01-22 PROCEDURE — 96376 TX/PRO/DX INJ SAME DRUG ADON: CPT | Performed by: EMERGENCY MEDICINE

## 2019-01-22 PROCEDURE — 99223 PR INITIAL HOSPITAL CARE,LEVL III: ICD-10-PCS | Mod: ,,, | Performed by: INTERNAL MEDICINE

## 2019-01-22 PROCEDURE — G0378 HOSPITAL OBSERVATION PER HR: HCPCS

## 2019-01-22 PROCEDURE — 36415 COLL VENOUS BLD VENIPUNCTURE: CPT

## 2019-01-22 PROCEDURE — 85610 PROTHROMBIN TIME: CPT

## 2019-01-22 PROCEDURE — 93306 2D ECHO WITH COLOR FLOW DOPPLER: ICD-10-PCS | Mod: 26,,, | Performed by: INTERNAL MEDICINE

## 2019-01-22 PROCEDURE — 94761 N-INVAS EAR/PLS OXIMETRY MLT: CPT

## 2019-01-22 PROCEDURE — 99223 1ST HOSP IP/OBS HIGH 75: CPT | Mod: ,,, | Performed by: INTERNAL MEDICINE

## 2019-01-22 PROCEDURE — 63600175 PHARM REV CODE 636 W HCPCS: Performed by: NURSE PRACTITIONER

## 2019-01-22 PROCEDURE — 85730 THROMBOPLASTIN TIME PARTIAL: CPT | Mod: 91

## 2019-01-22 PROCEDURE — 25000003 PHARM REV CODE 250: Performed by: NURSE PRACTITIONER

## 2019-01-22 PROCEDURE — 96365 THER/PROPH/DIAG IV INF INIT: CPT | Performed by: EMERGENCY MEDICINE

## 2019-01-22 PROCEDURE — 85025 COMPLETE CBC W/AUTO DIFF WBC: CPT

## 2019-01-22 PROCEDURE — 85730 THROMBOPLASTIN TIME PARTIAL: CPT

## 2019-01-22 PROCEDURE — 93306 TTE W/DOPPLER COMPLETE: CPT

## 2019-01-22 PROCEDURE — 84484 ASSAY OF TROPONIN QUANT: CPT

## 2019-01-22 PROCEDURE — 99285 EMERGENCY DEPT VISIT HI MDM: CPT | Mod: 25

## 2019-01-22 RX ORDER — ONDANSETRON 2 MG/ML
4 INJECTION INTRAMUSCULAR; INTRAVENOUS EVERY 12 HOURS PRN
Status: DISCONTINUED | OUTPATIENT
Start: 2019-01-22 | End: 2019-01-25 | Stop reason: HOSPADM

## 2019-01-22 RX ORDER — SODIUM CHLORIDE 0.9 % (FLUSH) 0.9 %
5 SYRINGE (ML) INJECTION
Status: DISCONTINUED | OUTPATIENT
Start: 2019-01-22 | End: 2019-01-25 | Stop reason: HOSPADM

## 2019-01-22 RX ORDER — METOPROLOL TARTRATE 25 MG/1
25 TABLET, FILM COATED ORAL 2 TIMES DAILY
Status: DISCONTINUED | OUTPATIENT
Start: 2019-01-22 | End: 2019-01-24

## 2019-01-22 RX ORDER — HEPARIN SODIUM,PORCINE/D5W 25000/250
12 INTRAVENOUS SOLUTION INTRAVENOUS CONTINUOUS
Status: DISCONTINUED | OUTPATIENT
Start: 2019-01-22 | End: 2019-01-23

## 2019-01-22 RX ORDER — ASPIRIN 81 MG/1
81 TABLET ORAL DAILY
Status: DISCONTINUED | OUTPATIENT
Start: 2019-01-23 | End: 2019-01-25 | Stop reason: HOSPADM

## 2019-01-22 RX ORDER — ACETAMINOPHEN 325 MG/1
650 TABLET ORAL EVERY 8 HOURS PRN
Status: DISCONTINUED | OUTPATIENT
Start: 2019-01-22 | End: 2019-01-25 | Stop reason: HOSPADM

## 2019-01-22 RX ORDER — SODIUM CHLORIDE 9 MG/ML
INJECTION, SOLUTION INTRAVENOUS CONTINUOUS
Status: DISCONTINUED | OUTPATIENT
Start: 2019-01-22 | End: 2019-01-25 | Stop reason: HOSPADM

## 2019-01-22 RX ADMIN — SODIUM CHLORIDE: 0.9 INJECTION, SOLUTION INTRAVENOUS at 06:01

## 2019-01-22 RX ADMIN — HEPARIN SODIUM AND DEXTROSE 12 UNITS/KG/HR: 10000; 5 INJECTION INTRAVENOUS at 06:01

## 2019-01-22 RX ADMIN — ACETAMINOPHEN 650 MG: 325 TABLET ORAL at 05:01

## 2019-01-22 NOTE — HPI
Ms Jessica is a 78 year old male with PMHx of DM and HTN who presented to [facility] Emergency Room after being seen at [facility] today. Patient reports getting up to use the bathroom, lost consciousness and passed out. He was on the floor brief before going back to bed. Patient also laceration to left forehead. Associated symptoms include increase nauseas, weakness and fatigue for the pass month. Denies associated symptoms of chest pain, shortness of breath, fever, chills, nauseas, vomiting or diaphoresis. CT of the Head today showed no acute intracranial abnormality seen. EKG: showed A Fib. Patient denies any history of A Fib. He is being placed in observation, under the care of Hospital Medicine.

## 2019-01-22 NOTE — HPI
Mr. Jessica is a 78 year old male with PMHx of HTN, HLP, DM who presented to [facility] ED due to recent syncopal episode overnight. Patient reports getting up last PM/early this AM and feeling weak and having syncopal episode afterward. Initial EKG revealed new onset AFIB, rate 89. ECHO pending. IV heparin gtt to start in ED for CVA prophylaxis. Labs pending at this time. Hospital medicine called for admission.  Cardiology consulted due to new onset AFIB. Patient seen and examined in ED. Denies chest pain or anginal equivalents at time of exam. No shortness of breath or VILLALOBOS. He does report episodes of palpitations but not bothersome. He took 2 Tylenol PM and 2 Melatonin yesterday PM to assist with sleep. Trace LE edema to day on exam. ECHO pending. Will keep NPO after MN for possible GANESH/DCCV. Further recs to follow.

## 2019-01-22 NOTE — SUBJECTIVE & OBJECTIVE
Past Medical History:   Diagnosis Date    Anxiety     BPH with obstruction/lower urinary tract symptoms     Depression     Diabetes mellitus type II, controlled     Hyperlipidemia associated with type 2 diabetes mellitus     Hypertension associated with diabetes        Past Surgical History:   Procedure Laterality Date    COLONOSCOPY N/A 5/1/2017    Performed by Vishal Andrade MD at [facility] ENDO    TONSILLECTOMY         Review of patient's allergies indicates:   Allergen Reactions    Codeine        No current facility-administered medications on file prior to encounter.      Current Outpatient Medications on File Prior to Encounter   Medication Sig    aspirin (ECOTRIN) 81 MG EC tablet Take 81 mg by mouth once daily.    calcium-vitamin D3 500 mg(1,250mg) -200 unit per tablet Take 1 tablet by mouth every evening.    cyanocobalamin (VITAMIN B-12) 1000 MCG tablet Take 100 mcg by mouth once daily.    fish oil-omega-3 fatty acids 300-1,000 mg capsule Take 2 g by mouth once daily.    FLAXSEED OIL ORAL Take by mouth.    lisinopril-hydrochlorothiazide (PRINZIDE,ZESTORETIC) 20-12.5 mg per tablet TAKE ONE TABLET BY MOUTH ONCE DAILY    metFORMIN (GLUCOPHAGE) 850 MG tablet TAKE ONE TABLET BY MOUTH ONCE DAILY    simvastatin (ZOCOR) 40 MG tablet TAKE ONE TABLET BY MOUTH ONCE DAILY    ondansetron (ZOFRAN-ODT) 4 MG TbDL Take 1 tablet (4 mg total) by mouth every 8 (eight) hours as needed (tid prn nausea).    sildenafil (REVATIO) 20 mg Tab Take 1-4 pills one hour prior to intercourse    vitamin E 1000 UNIT capsule Take 1,000 Units by mouth once daily.     Family History     Problem Relation (Age of Onset)    Cancer Brother    Heart disease Mother, Father, Brother        Tobacco Use    Smoking status: Former Smoker   Substance and Sexual Activity    Alcohol use: Yes     Alcohol/week: 0.6 oz     Types: 1 Glasses of wine per week     Comment: occasionally     Drug use: No    Sexual activity: No     Review of Systems    Constitutional: Positive for activity change, appetite change (decrease ) and fatigue. Negative for chills, diaphoresis and fever.   HENT: Negative for congestion, ear discharge, rhinorrhea, sinus pressure, sore throat and trouble swallowing.    Eyes: Negative for discharge and visual disturbance.   Respiratory: Negative for apnea, cough, choking, chest tightness, shortness of breath, wheezing and stridor.    Cardiovascular: Negative for chest pain, palpitations and leg swelling.   Gastrointestinal: Negative for abdominal distention, abdominal pain, diarrhea, nausea and vomiting.   Endocrine: Negative for cold intolerance and heat intolerance.   Genitourinary: Negative for dysuria, frequency and hematuria.   Musculoskeletal: Negative for arthralgias, back pain, myalgias and neck pain.   Skin: Negative for pallor and rash.   Neurological: Positive for dizziness, syncope and weakness. Negative for seizures and headaches.   Psychiatric/Behavioral: Negative for agitation, confusion and sleep disturbance.     Objective:     Vital Signs (Most Recent):  Temp: (!) 101.1 °F (38.4 °C) (01/22/19 1719)  Pulse: 79 (01/22/19 1719)  Resp: 18 (01/22/19 1719)  BP: 102/60 (01/22/19 1719)  SpO2: (!) 94 % (01/22/19 1719) Vital Signs (24h Range):  Temp:  [97.9 °F (36.6 °C)-101.1 °F (38.4 °C)] 101.1 °F (38.4 °C)  Pulse:  [] 79  Resp:  [18-24] 18  SpO2:  [94 %-100 %] 94 %  BP: ()/(56-74) 102/60     Weight: 97.7 kg (215 lb 6.2 oz)  Body mass index is 33.73 kg/m².    Physical Exam   Constitutional: No distress.   HENT:   Mouth/Throat: No oropharyngeal exudate.   Eyes: Right eye exhibits no discharge. Left eye exhibits no discharge.   Neck: No JVD present.   Cardiovascular: An irregular rhythm present. Exam reveals no gallop and no friction rub.   No murmur heard.  Pulmonary/Chest: No stridor. No respiratory distress. He has no wheezes. He has no rales. He exhibits no tenderness.   Abdominal: He exhibits no distension and no  mass. There is no tenderness. There is no rebound and no guarding. No hernia.   Musculoskeletal: He exhibits no edema or deformity.   Neurological: He is alert.   Skin: Skin is warm and dry. Capillary refill takes less than 2 seconds. He is not diaphoretic.   Psychiatric: He has a normal mood and affect. His behavior is normal. Judgment and thought content normal.   Nursing note and vitals reviewed.          Significant Labs:   BMP:   Recent Labs   Lab 01/22/19  1620   *      K 4.6      CO2 22*   BUN 22   CREATININE 1.1   CALCIUM 9.4     CBC:   Recent Labs   Lab 01/22/19  1620   WBC 10.68   HGB 15.2   HCT 44.1

## 2019-01-22 NOTE — SUBJECTIVE & OBJECTIVE
Past Medical History:   Diagnosis Date    Anxiety     BPH with obstruction/lower urinary tract symptoms     Depression     Diabetes mellitus type II, controlled     Hyperlipidemia associated with type 2 diabetes mellitus     Hypertension associated with diabetes        Past Surgical History:   Procedure Laterality Date    COLONOSCOPY N/A 5/1/2017    Performed by Vishal Andrade MD at [facility] ENDO    TONSILLECTOMY         Review of patient's allergies indicates:   Allergen Reactions    Codeine        No current facility-administered medications on file prior to encounter.      Current Outpatient Medications on File Prior to Encounter   Medication Sig    aspirin (ECOTRIN) 81 MG EC tablet Take 81 mg by mouth once daily.    calcium-vitamin D3 500 mg(1,250mg) -200 unit per tablet Take 1 tablet by mouth every evening.    cyanocobalamin (VITAMIN B-12) 1000 MCG tablet Take 100 mcg by mouth once daily.    fish oil-omega-3 fatty acids 300-1,000 mg capsule Take 2 g by mouth once daily.    FLAXSEED OIL ORAL Take by mouth.    lisinopril-hydrochlorothiazide (PRINZIDE,ZESTORETIC) 20-12.5 mg per tablet TAKE ONE TABLET BY MOUTH ONCE DAILY    metFORMIN (GLUCOPHAGE) 850 MG tablet TAKE ONE TABLET BY MOUTH ONCE DAILY    simvastatin (ZOCOR) 40 MG tablet TAKE ONE TABLET BY MOUTH ONCE DAILY    ondansetron (ZOFRAN-ODT) 4 MG TbDL Take 1 tablet (4 mg total) by mouth every 8 (eight) hours as needed (tid prn nausea).    sildenafil (REVATIO) 20 mg Tab Take 1-4 pills one hour prior to intercourse    vitamin E 1000 UNIT capsule Take 1,000 Units by mouth once daily.     Family History     Problem Relation (Age of Onset)    Cancer Brother    Heart disease Mother, Father, Brother        Tobacco Use    Smoking status: Former Smoker   Substance and Sexual Activity    Alcohol use: Yes     Alcohol/week: 0.6 oz     Types: 1 Glasses of wine per week     Comment: occasionally     Drug use: No    Sexual activity: No     Review of Systems    Constitution: Negative for weakness.   HENT: Negative for hearing loss and hoarse voice.    Eyes: Negative for blurred vision and visual disturbance.   Cardiovascular: Positive for irregular heartbeat, leg swelling, palpitations and syncope. Negative for chest pain, claudication, dyspnea on exertion, near-syncope, orthopnea and paroxysmal nocturnal dyspnea.   Respiratory: Negative for cough, hemoptysis, shortness of breath, sleep disturbances due to breathing, snoring and wheezing.    Endocrine: Negative for cold intolerance and heat intolerance.   Hematologic/Lymphatic: Does not bruise/bleed easily.   Skin: Negative for color change, dry skin and nail changes.   Musculoskeletal: Positive for arthritis. Negative for back pain, joint pain and myalgias.   Gastrointestinal: Negative for bloating, abdominal pain, constipation, nausea and vomiting.   Genitourinary: Negative for dysuria, flank pain, hematuria and hesitancy.   Neurological: Negative for headaches, light-headedness, loss of balance, numbness and paresthesias.   Psychiatric/Behavioral: Negative for altered mental status.   Allergic/Immunologic: Negative for environmental allergies.     Objective:     Vital Signs (Most Recent):  Temp: 98.7 °F (37.1 °C) (01/22/19 1320)  Pulse: 86 (01/22/19 1535)  Resp: 20 (01/22/19 1535)  BP: (!) 122/59 (01/22/19 1535)  SpO2: 98 % (01/22/19 1535) Vital Signs (24h Range):  Temp:  [97.9 °F (36.6 °C)-98.7 °F (37.1 °C)] 98.7 °F (37.1 °C)  Pulse:  [] 86  Resp:  [18-24] 20  SpO2:  [95 %-100 %] 98 %  BP: ()/(56-74) 122/59     Weight: 97.7 kg (215 lb 6.2 oz)  Body mass index is 33.73 kg/m².    SpO2: 98 %  O2 Device (Oxygen Therapy): room air    No intake or output data in the 24 hours ending 01/22/19 1639    Lines/Drains/Airways     Peripheral Intravenous Line                 Peripheral IV - Single Lumen 01/22/19 1621 Right Antecubital less than 1 day         Peripheral IV - Single Lumen 01/22/19 Left Forearm less than 1  day                Physical Exam   Constitutional: He is oriented to person, place, and time. He appears well-developed and well-nourished. No distress.   HENT:   Head: Normocephalic and atraumatic.   Neck: Normal range of motion and full passive range of motion without pain. Neck supple. No JVD present.   Cardiovascular: Normal rate, S1 normal, S2 normal and intact distal pulses. An irregularly irregular rhythm present. PMI is not displaced. Exam reveals no distant heart sounds.   No murmur heard.  Pulses:       Radial pulses are 2+ on the right side, and 2+ on the left side.        Dorsalis pedis pulses are 2+ on the right side, and 2+ on the left side.   New onset AFIB on monitor today   Pulmonary/Chest: Effort normal and breath sounds normal. No accessory muscle usage. No respiratory distress. He has no decreased breath sounds. He has no wheezes. He has no rales.   Musculoskeletal: Normal range of motion. He exhibits edema (trace LE).        Right ankle: He exhibits swelling.        Left ankle: He exhibits swelling.   Neurological: He is alert and oriented to person, place, and time.   Skin: Skin is warm and dry. He is not diaphoretic. No cyanosis. Nails show no clubbing.   Psychiatric: He has a normal mood and affect. His speech is normal and behavior is normal. Judgment and thought content normal. Cognition and memory are normal.   Nursing note and vitals reviewed.      Significant Labs: BMP: No results for input(s): GLU, NA, K, CL, CO2, BUN, CREATININE, CALCIUM, MG in the last 48 hours., CMP No results for input(s): NA, K, CL, CO2, GLU, BUN, CREATININE, CALCIUM, PROT, ALBUMIN, BILITOT, ALKPHOS, AST, ALT, ANIONGAP, ESTGFRAFRICA, EGFRNONAA in the last 48 hours., CBC No results for input(s): WBC, HGB, HCT, PLT in the last 48 hours., INR No results for input(s): INR, PROTIME in the last 48 hours., Lipid Panel No results for input(s): CHOL, HDL, LDLCALC, TRIG, CHOLHDL in the last 48 hours., Troponin No results for  input(s): TROPONINI in the last 48 hours. and All pertinent lab results from the last 24 hours have been reviewed.    Significant Imaging: Echocardiogram: 2D echo with color flow doppler: No results found for this or any previous visit. and X-Ray: CXR: X-Ray Chest 1 View (CXR): No results found for this visit on 01/22/19. and X-Ray Chest PA and Lateral (CXR): No results found for this visit on 01/22/19.

## 2019-01-22 NOTE — TELEPHONE ENCOUNTER
----- Message from Grecia Patterson sent at 1/22/2019 11:10 AM CST -----  Contact: pt   States he's calling to inform the nurse and Dr that he is being transported from [facility] to [facility] and wants to speak with Fe or Dr Zarate and can be reached at [phone]//thanks/dbw pt is preparing to be sent as we speak//thanks/dbw

## 2019-01-22 NOTE — PLAN OF CARE
01/22/19 1440   RAMSAY Message   Medicare Outpatient and Observation Notification regarding financial responsibility Given to patient/caregiver;Explained to patient/caregiver;Signed/date by patient/caregiver   Date RAMSAY was signed 01/22/19   Time RAMSAY was signed 1440

## 2019-01-22 NOTE — ASSESSMENT & PLAN NOTE
Patient presented to ED due to syncope and new onset AFIB on EKG upon arrival to ED  IV heparin gtt to be started for CVA prophylaxis today  Continue ASA, BB, IV heparin gtt for now  ECHO pending  Will keep NPO after MN for possible GANESH/DCCV in AM  No CNS complaints to suggest TIA or CVA today.  Has stigmata for Sleep apnea and needs MACK workup as OP  Further recs to follow in AM

## 2019-01-22 NOTE — CONSULTS
[facility]  Cardiology  Consult Note    Patient Name: Bi Jessica  MRN: [id number]  Admission Date: 1/22/2019  Hospital Length of Stay: 0 days  Code Status: Full Code   Attending Provider: No att. providers found   Consulting Provider: Shyla Taveras NP  Primary Care Physician: Thee Zarate MD  Principal Problem:New onset a-fib    Patient information was obtained from patient, relative(s), past medical records and ER records.     Inpatient consult to Cardiology  Consult performed by: Shyla Taveras NP  Consult ordered by: Derik Stiles NP        Subjective:     Chief Complaint:  Syncope, new onset AFIB     HPI:   Mr. Jessica is a 78 year old male with PMHx of HTN, HLP, DM who presented to [facility] ED due to recent syncopal episode overnight. Patient reports getting up last PM/early this AM and feeling weak and having syncopal episode afterward. Initial EKG revealed new onset AFIB, rate 89. ECHO pending. IV heparin gtt to start in ED for CVA prophylaxis. Labs pending at this time. Hospital medicine called for admission.  Cardiology consulted due to new onset AFIB. Patient seen and examined in ED. Denies chest pain or anginal equivalents at time of exam. No shortness of breath or VILLALOBOS. He does report episodes of palpitations but not bothersome. He took 2 Tylenol PM and 2 Melatonin yesterday PM to assist with sleep. Trace LE edema to day on exam. ECHO pending. Will keep NPO after MN for possible GANESH/DCCV. Further recs to follow.     Past Medical History:   Diagnosis Date    Anxiety     BPH with obstruction/lower urinary tract symptoms     Depression     Diabetes mellitus type II, controlled     Hyperlipidemia associated with type 2 diabetes mellitus     Hypertension associated with diabetes        Past Surgical History:   Procedure Laterality Date    COLONOSCOPY N/A 5/1/2017    Performed by Vishal Andrade MD at [facility] ENDO    TONSILLECTOMY         Review of patient's allergies indicates:    Allergen Reactions    Codeine        No current facility-administered medications on file prior to encounter.      Current Outpatient Medications on File Prior to Encounter   Medication Sig    aspirin (ECOTRIN) 81 MG EC tablet Take 81 mg by mouth once daily.    calcium-vitamin D3 500 mg(1,250mg) -200 unit per tablet Take 1 tablet by mouth every evening.    cyanocobalamin (VITAMIN B-12) 1000 MCG tablet Take 100 mcg by mouth once daily.    fish oil-omega-3 fatty acids 300-1,000 mg capsule Take 2 g by mouth once daily.    FLAXSEED OIL ORAL Take by mouth.    lisinopril-hydrochlorothiazide (PRINZIDE,ZESTORETIC) 20-12.5 mg per tablet TAKE ONE TABLET BY MOUTH ONCE DAILY    metFORMIN (GLUCOPHAGE) 850 MG tablet TAKE ONE TABLET BY MOUTH ONCE DAILY    simvastatin (ZOCOR) 40 MG tablet TAKE ONE TABLET BY MOUTH ONCE DAILY    ondansetron (ZOFRAN-ODT) 4 MG TbDL Take 1 tablet (4 mg total) by mouth every 8 (eight) hours as needed (tid prn nausea).    sildenafil (REVATIO) 20 mg Tab Take 1-4 pills one hour prior to intercourse    vitamin E 1000 UNIT capsule Take 1,000 Units by mouth once daily.     Family History     Problem Relation (Age of Onset)    Cancer Brother    Heart disease Mother, Father, Brother        Tobacco Use    Smoking status: Former Smoker   Substance and Sexual Activity    Alcohol use: Yes     Alcohol/week: 0.6 oz     Types: 1 Glasses of wine per week     Comment: occasionally     Drug use: No    Sexual activity: No     Review of Systems   Constitution: Negative for weakness.   HENT: Negative for hearing loss and hoarse voice.    Eyes: Negative for blurred vision and visual disturbance.   Cardiovascular: Positive for irregular heartbeat, leg swelling, palpitations and syncope. Negative for chest pain, claudication, dyspnea on exertion, near-syncope, orthopnea and paroxysmal nocturnal dyspnea.   Respiratory: Negative for cough, hemoptysis, shortness of breath, sleep disturbances due to breathing,  snoring and wheezing.    Endocrine: Negative for cold intolerance and heat intolerance.   Hematologic/Lymphatic: Does not bruise/bleed easily.   Skin: Negative for color change, dry skin and nail changes.   Musculoskeletal: Positive for arthritis. Negative for back pain, joint pain and myalgias.   Gastrointestinal: Negative for bloating, abdominal pain, constipation, nausea and vomiting.   Genitourinary: Negative for dysuria, flank pain, hematuria and hesitancy.   Neurological: Negative for headaches, light-headedness, loss of balance, numbness and paresthesias.   Psychiatric/Behavioral: Negative for altered mental status.   Allergic/Immunologic: Negative for environmental allergies.     Objective:     Vital Signs (Most Recent):  Temp: 98.7 °F (37.1 °C) (01/22/19 1320)  Pulse: 86 (01/22/19 1535)  Resp: 20 (01/22/19 1535)  BP: (!) 122/59 (01/22/19 1535)  SpO2: 98 % (01/22/19 1535) Vital Signs (24h Range):  Temp:  [97.9 °F (36.6 °C)-98.7 °F (37.1 °C)] 98.7 °F (37.1 °C)  Pulse:  [] 86  Resp:  [18-24] 20  SpO2:  [95 %-100 %] 98 %  BP: ()/(56-74) 122/59     Weight: 97.7 kg (215 lb 6.2 oz)  Body mass index is 33.73 kg/m².    SpO2: 98 %  O2 Device (Oxygen Therapy): room air    No intake or output data in the 24 hours ending 01/22/19 1639    Lines/Drains/Airways     Peripheral Intravenous Line                 Peripheral IV - Single Lumen 01/22/19 1621 Right Antecubital less than 1 day         Peripheral IV - Single Lumen 01/22/19 Left Forearm less than 1 day                Physical Exam   Constitutional: He is oriented to person, place, and time. He appears well-developed and well-nourished. No distress.   HENT:   Head: Normocephalic and atraumatic.   Neck: Normal range of motion and full passive range of motion without pain. Neck supple. No JVD present.   Cardiovascular: Normal rate, S1 normal, S2 normal and intact distal pulses. An irregularly irregular rhythm present. PMI is not displaced. Exam reveals no  distant heart sounds.   No murmur heard.  Pulses:       Radial pulses are 2+ on the right side, and 2+ on the left side.        Dorsalis pedis pulses are 2+ on the right side, and 2+ on the left side.   New onset AFIB on monitor today   Pulmonary/Chest: Effort normal and breath sounds normal. No accessory muscle usage. No respiratory distress. He has no decreased breath sounds. He has no wheezes. He has no rales.   Musculoskeletal: Normal range of motion. He exhibits edema (trace LE).        Right ankle: He exhibits swelling.        Left ankle: He exhibits swelling.   Neurological: He is alert and oriented to person, place, and time.   Skin: Skin is warm and dry. He is not diaphoretic. No cyanosis. Nails show no clubbing.   Psychiatric: He has a normal mood and affect. His speech is normal and behavior is normal. Judgment and thought content normal. Cognition and memory are normal.   Nursing note and vitals reviewed.      Significant Labs: BMP: No results for input(s): GLU, NA, K, CL, CO2, BUN, CREATININE, CALCIUM, MG in the last 48 hours., CMP No results for input(s): NA, K, CL, CO2, GLU, BUN, CREATININE, CALCIUM, PROT, ALBUMIN, BILITOT, ALKPHOS, AST, ALT, ANIONGAP, ESTGFRAFRICA, EGFRNONAA in the last 48 hours., CBC No results for input(s): WBC, HGB, HCT, PLT in the last 48 hours., INR No results for input(s): INR, PROTIME in the last 48 hours., Lipid Panel No results for input(s): CHOL, HDL, LDLCALC, TRIG, CHOLHDL in the last 48 hours., Troponin No results for input(s): TROPONINI in the last 48 hours. and All pertinent lab results from the last 24 hours have been reviewed.    Significant Imaging: Echocardiogram: 2D echo with color flow doppler: No results found for this or any previous visit. and X-Ray: CXR: X-Ray Chest 1 View (CXR): No results found for this visit on 01/22/19. and X-Ray Chest PA and Lateral (CXR): No results found for this visit on 01/22/19.    Assessment and Plan:     * New onset a-fib     Patient presented to ED due to syncope and new onset AFIB on EKG upon arrival to ED  IV heparin gtt to be started for CVA prophylaxis today  Continue ASA, BB, IV heparin gtt for now  ECHO pending  Will keep NPO after MN for possible GANESH/DCCV in AM  No CNS complaints to suggest TIA or CVA today.  Has stigmata for Sleep apnea and needs MACK workup as OP  Further recs to follow in AM     Syncope    Telemetry monitoring overnight       Hyperlipidemia associated with type 2 diabetes mellitus    Continue statin         VTE Risk Mitigation (From admission, onward)        Ordered     heparin 25,000 units in dextrose 5% (100 units/ml) IV bolus from bag INITIAL BOLUS  Once      01/22/19 1528     heparin 25,000 units in dextrose 5% 250 mL (100 units/mL) infusion LOW INTENSITY nomogram - [facility]  Continuous      01/22/19 1528     heparin 25,000 units in dextrose 5% (100 units/ml) IV bolus from bag - ADDITIONAL PRN BOLUS - 60 units/kg  As needed (PRN)      01/22/19 1528     heparin 25,000 units in dextrose 5% (100 units/ml) IV bolus from bag - ADDITIONAL PRN BOLUS - 30 units/kg  As needed (PRN)      01/22/19 1528     IP VTE HIGH RISK PATIENT  Once      01/22/19 1348     Place sequential compression device  Until discontinued      01/22/19 1348          Thank you for your consult. I will follow-up with patient. Please contact us if you have any additional questions.    Shyla Taveras NP  Cardiology   [facility]

## 2019-01-22 NOTE — ED NOTES
Bed: 17  Expected date: 1/22/19  Expected time: 10:53 AM  Means of arrival: Ambulance Service  Comments:     Yessica Talley RN  01/22/19 0514

## 2019-01-22 NOTE — ED PROVIDER NOTES
SCRIBE #1 NOTE: I, Marion Ragland, am scribing for, and in the presence of, Alexey Cerna MD. I have scribed the entire note.       History     Chief Complaint   Patient presents with    Atrial Fibrillation     new onset, sent from [facility], also c/o orthostatic and dehydrated; pt has been c/o fatigue for 1 week     Review of patient's allergies indicates:   Allergen Reactions    Codeine          History of Present Illness     HPI    1/22/2019, 1:24 PM  History obtained from the patient      History of Present Illness: Bi Jessica is a 78 y.o. male patient who presents to the Emergency Department for evaluation of a syncopal episode which onset suddenly earlier today. Pt is a transfer from [facility]. He has been c/o fatigue for a week and was found to have new onset of a-fib. Symptoms are constant and moderate in severity. No mitigating or exacerbating factors reported. Patient denies any fever, chills, CP, SOB, leg swelling, palpitations, n/v/d, abd pain, weakness, numbness, and all other sxs at this time. No further complaints or concerns at this time.       Arrival mode: AASI    PCP: Thee Zarate MD        Past Medical History:  Past Medical History:   Diagnosis Date    Anxiety     BPH with obstruction/lower urinary tract symptoms     Depression     Diabetes mellitus type II, controlled     Hyperlipidemia associated with type 2 diabetes mellitus     Hypertension associated with diabetes        Past Surgical History:  Past Surgical History:   Procedure Laterality Date    COLONOSCOPY N/A 5/1/2017    Performed by Vishal Andrade [facility] at [facility] ENDO    TONSILLECTOMY           Family History:  History reviewed. No pertinent family history.    Social History:  Social History     Tobacco Use    Smoking status: Former Smoker   Substance and Sexual Activity    Alcohol use: Yes     Alcohol/week: 0.6 oz     Types: 1 Glasses of wine per week    Drug use: No    Sexual activity: No        Review  of Systems     Review of Systems   Constitutional: Positive for fatigue. Negative for chills, diaphoresis and fever.   HENT: Negative for congestion, rhinorrhea and sore throat.    Respiratory: Negative for cough and shortness of breath.    Cardiovascular: Negative for chest pain.        (+) irregular heartbeat   Gastrointestinal: Negative for abdominal pain, diarrhea, nausea and vomiting.   Genitourinary: Negative for dysuria, frequency and hematuria.   Musculoskeletal: Negative for back pain and neck pain.   Skin: Negative for rash.   Neurological: Positive for syncope. Negative for dizziness, weakness, numbness and headaches.   Hematological: Does not bruise/bleed easily.   All other systems reviewed and are negative.     Physical Exam     Initial Vitals [01/22/19 1320]   BP Pulse Resp Temp SpO2   105/68 98 20 98.7 °F (37.1 °C) 100 %      MAP       --          Physical Exam  Nursing Notes and Vital Signs Reviewed.  Constitutional: Patient is in no acute distress. Well-developed and well-nourished.  Head: Atraumatic. Normocephalic.  Eyes: PERRL. EOM intact. Conjunctivae are not pale. No scleral icterus.  ENT: Mucous membranes are moist. Oropharynx is clear and symmetric.    Neck: Supple. Full ROM. No lymphadenopathy.  Cardiovascular: Irregularly irregular rhythm. No murmurs, rubs, or gallops. Distal pulses are 2+ and symmetric.  Pulmonary/Chest: No respiratory distress. Clear to auscultation bilaterally. No wheezing or rales.  Abdominal: Soft and non-distended.  There is no tenderness.  No rebound, guarding, or rigidity.   Musculoskeletal: Moves all extremities. No obvious deformities. No edema. No calf tenderness.  Skin: Warm and dry.  Neurological:  Alert, awake, and appropriate.  Normal speech.  No acute focal neurological deficits are appreciated.  Psychiatric: Normal affect. Good eye contact. Appropriate in content.     ED Course   Procedures  ED Vital Signs:  Vitals:    01/22/19 1320 01/22/19 1351 01/22/19  "1356 01/22/19 1358   BP: 105/68 (!) 113/59 (!) 102/56 (!) 95/59   Pulse: 98 95 95 (!) 112   Resp: 20 19     Temp: 98.7 °F (37.1 °C)      TempSrc: Oral      SpO2: 100% 99%     Weight: 97.7 kg (215 lb 6.2 oz)      Height: 5' 7" (1.702 m)       01/22/19 1359   BP: 102/65   Pulse: 106   Resp:    Temp:    TempSrc:    SpO2:    Weight:    Height:        Imaging Results:  Imaging Results    None          The EKG was ordered, reviewed, and independently interpreted by the ED provider.  Interpretation time: 13:30  Rate: 89 BPM  Rhythm: atrial fibrillation  Interpretation: Minimal voltage criteria for LVH. Nonspecific T wave abnormality. No STEMI.             The Emergency Provider reviewed the vital signs and test results, which are outlined above.     ED Discussion     1:30 PM: Discussed case with J CARLOS Sommer ([facility] Medicine). Dr. Ragland agrees with current care and management of pt and accepts admission.   Admitting Service: [facility] medicine   Admitting Physician: Dr. Ragland  Admit to: Obs/Tele    1:36 PM: Re-evaluated pt. I have discussed test results, shared treatment plan, and the need for admission with patient at bedside. Pt expresses understanding at this time and agree with all information. All questions answered. Pt has no further questions or concerns at this time. Pt is ready for admit.    ED Medication(s):  Medications   sodium chloride 0.9% flush 5 mL (not administered)   ondansetron injection 4 mg (not administered)   promethazine (PHENERGAN) 6.25 mg in dextrose 5 % 50 mL IVPB (not administered)   acetaminophen tablet 650 mg (not administered)                   Medical Decision Making:   Clinical Tests:   Medical Tests: Reviewed and Ordered             Scribe Attestation:   Scribe #1: I performed the above scribed service and the documentation accurately describes the services I performed. I attest to the accuracy of the note.     Attending:   Physician Attestation Statement for Scribe #1: Alexey SANCHEZ" ANTONIO Cerna MD, personally performed the services described in this documentation, as scribed by Marion Ragland, in my presence, and it is both accurate and complete.           Clinical Impression       ICD-10-CM ICD-9-CM   1. Syncope, unspecified syncope type R55 780.2   2. Atrial fibrillation, unspecified type I48.91 427.31       Disposition:   Disposition: Placed in Observation  Condition: Fair         Alexey Cerna MD  01/22/19 3144

## 2019-01-22 NOTE — ASSESSMENT & PLAN NOTE
Consult cardiology  Heparin drip   B blocker, statin and ASA  Telemetry monitor   NPO p MN  GANESH/Cardioversion tomorrow

## 2019-01-22 NOTE — H&P
[facility] Hospital Medicine  History & Physical    Patient Name: Bi Jessica  MRN: [id number]  Admission Date: 1/22/2019  Attending Physician: Hemalatha Ragland MD   Primary Care Provider: Thee Zarate MD         Patient information was obtained from patient, relative(s) and ER records.     Subjective:     Principal Problem:New onset a-fib    Chief Complaint:   Chief Complaint   Patient presents with    Atrial Fibrillation     new onset, sent from [facility], also c/o orthostatic and dehydrated; pt has been c/o fatigue for 1 week        HPI: Ms Jessica is a 78 year old male with PMHx of DM and HTN who presented to [facility] Emergency Room after being seen at [facility] today. Patient reports getting up to use the bathroom, lost consciousness and passed out. He was on the floor brief before going back to bed. Patient also laceration to left forehead. Associated symptoms include increase nauseas, weakness and fatigue for the pass month. Denies associated symptoms of chest pain, shortness of breath, fever, chills, nauseas, vomiting or diaphoresis. CT of the Head today showed no acute intracranial abnormality seen. EKG: showed A Fib. Patient denies any history of A Fib. He is being placed in observation, under the care of Hospital Medicine.     Past Medical History:   Diagnosis Date    Anxiety     BPH with obstruction/lower urinary tract symptoms     Depression     Diabetes mellitus type II, controlled     Hyperlipidemia associated with type 2 diabetes mellitus     Hypertension associated with diabetes        Past Surgical History:   Procedure Laterality Date    COLONOSCOPY N/A 5/1/2017    Performed [facility]al Andrade [facility] ENDO    TONSILLECTOMY         Review of patient's allergies indicates:   Allergen Reactions    Codeine        No current facility-administered medications on file prior to encounter.      Current Outpatient Medications on File Prior to Encounter   Medication  Sig    aspirin (ECOTRIN) 81 MG EC tablet Take 81 mg by mouth once daily.    calcium-vitamin D3 500 mg(1,250mg) -200 unit per tablet Take 1 tablet by mouth every evening.    cyanocobalamin (VITAMIN B-12) 1000 MCG tablet Take 100 mcg by mouth once daily.    fish oil-omega-3 fatty acids 300-1,000 mg capsule Take 2 g by mouth once daily.    FLAXSEED OIL ORAL Take by mouth.    lisinopril-hydrochlorothiazide (PRINZIDE,ZESTORETIC) 20-12.5 mg per tablet TAKE ONE TABLET BY MOUTH ONCE DAILY    metFORMIN (GLUCOPHAGE) 850 MG tablet TAKE ONE TABLET BY MOUTH ONCE DAILY    simvastatin (ZOCOR) 40 MG tablet TAKE ONE TABLET BY MOUTH ONCE DAILY    ondansetron (ZOFRAN-ODT) 4 MG TbDL Take 1 tablet (4 mg total) by mouth every 8 (eight) hours as needed (tid prn nausea).    sildenafil (REVATIO) 20 mg Tab Take 1-4 pills one hour prior to intercourse    vitamin E 1000 UNIT capsule Take 1,000 Units by mouth once daily.     Family History     Problem Relation (Age of Onset)    Cancer Brother    Heart disease Mother, Father, Brother        Tobacco Use    Smoking status: Former Smoker   Substance and Sexual Activity    Alcohol use: Yes     Alcohol/week: 0.6 oz     Types: 1 Glasses of wine per week     Comment: occasionally     Drug use: No    Sexual activity: No     Review of Systems   Constitutional: Positive for activity change, appetite change (decrease ) and fatigue. Negative for chills, diaphoresis and fever.   HENT: Negative for congestion, ear discharge, rhinorrhea, sinus pressure, sore throat and trouble swallowing.    Eyes: Negative for discharge and visual disturbance.   Respiratory: Negative for apnea, cough, choking, chest tightness, shortness of breath, wheezing and stridor.    Cardiovascular: Negative for chest pain, palpitations and leg swelling.   Gastrointestinal: Negative for abdominal distention, abdominal pain, diarrhea, nausea and vomiting.   Endocrine: Negative for cold intolerance and heat intolerance.    Genitourinary: Negative for dysuria, frequency and hematuria.   Musculoskeletal: Negative for arthralgias, back pain, myalgias and neck pain.   Skin: Negative for pallor and rash.   Neurological: Positive for dizziness, syncope and weakness. Negative for seizures and headaches.   Psychiatric/Behavioral: Negative for agitation, confusion and sleep disturbance.     Objective:     Vital Signs (Most Recent):  Temp: (!) 101.1 °F (38.4 °C) (01/22/19 1719)  Pulse: 79 (01/22/19 1719)  Resp: 18 (01/22/19 1719)  BP: 102/60 (01/22/19 1719)  SpO2: (!) 94 % (01/22/19 1719) Vital Signs (24h Range):  Temp:  [97.9 °F (36.6 °C)-101.1 °F (38.4 °C)] 101.1 °F (38.4 °C)  Pulse:  [] 79  Resp:  [18-24] 18  SpO2:  [94 %-100 %] 94 %  BP: ()/(56-74) 102/60     Weight: 97.7 kg (215 lb 6.2 oz)  Body mass index is 33.73 kg/m².    Physical Exam   Constitutional: No distress.   HENT:   Mouth/Throat: No oropharyngeal exudate.   Eyes: Right eye exhibits no discharge. Left eye exhibits no discharge.   Neck: No JVD present.   Cardiovascular: An irregular rhythm present. Exam reveals no gallop and no friction rub.   No murmur heard.  Pulmonary/Chest: No stridor. No respiratory distress. He has no wheezes. He has no rales. He exhibits no tenderness.   Abdominal: He exhibits no distension and no mass. There is no tenderness. There is no rebound and no guarding. No hernia.   Musculoskeletal: He exhibits no edema or deformity.   Neurological: He is alert.   Skin: Skin is warm and dry. Capillary refill takes less than 2 seconds. He is not diaphoretic.   Psychiatric: He has a normal mood and affect. His behavior is normal. Judgment and thought content normal.   Nursing note and vitals reviewed.          Significant Labs:   BMP:   Recent Labs   Lab 01/22/19  1620   *      K 4.6      CO2 22*   BUN 22   CREATININE 1.1   CALCIUM 9.4     CBC:   Recent Labs   Lab 01/22/19  1620   WBC 10.68   HGB 15.2   HCT 44.1                   Assessment/Plan:     * New onset a-fib    Consult cardiology  Heparin drip   B blocker, statin and ASA  Telemetry monitor   NPO p MN  GANESH/Cardioversion tomorrow             Syncope    Place in observation   Neuro checks   Bilateral carotid ultrasound   Check 2 D Echo   CT of head, no acute finding.  IVF   Orthostatic BP           Diabetes mellitus type II, controlled    Accu check with SSI  Diabetic diet        Hypertension associated with diabetes    Continue B blocker   Monitor        Hyperlipidemia associated with type 2 diabetes mellitus             VTE Risk Mitigation (From admission, onward)        Ordered     heparin 25,000 units in dextrose 5% (100 units/ml) IV bolus from bag INITIAL BOLUS  Once      01/22/19 1528     heparin 25,000 units in dextrose 5% 250 mL (100 units/mL) infusion LOW INTENSITY nomogram - [facility]  Continuous      01/22/19 1528     heparin 25,000 units in dextrose 5% (100 units/ml) IV bolus from bag - ADDITIONAL PRN BOLUS - 60 units/kg  As needed (PRN)      01/22/19 1528     heparin 25,000 units in dextrose 5% (100 units/ml) IV bolus from bag - ADDITIONAL PRN BOLUS - 30 units/kg  As needed (PRN)      01/22/19 1528     IP VTE HIGH RISK PATIENT  Once      01/22/19 1348     Place sequential compression device  Until discontinued      01/22/19 1348             Derik Stiles NP  Department of Hospital Medicine   [facility]

## 2019-01-22 NOTE — ASSESSMENT & PLAN NOTE
Place in observation   Neuro checks   Bilateral carotid ultrasound   Check 2 D Echo   CT of head, no acute finding.  IVF   Orthostatic BP

## 2019-01-23 ENCOUNTER — ANESTHESIA (OUTPATIENT)
Dept: CARDIOLOGY | Facility: HOSPITAL | Age: 79
DRG: 864 | End: 2019-01-23
Payer: MEDICARE

## 2019-01-23 ENCOUNTER — ANESTHESIA EVENT (OUTPATIENT)
Dept: CARDIOLOGY | Facility: HOSPITAL | Age: 79
DRG: 864 | End: 2019-01-23
Payer: MEDICARE

## 2019-01-23 PROBLEM — I51.3 THROMBUS OF LEFT ATRIAL APPENDAGE: Status: ACTIVE | Noted: 2019-01-23

## 2019-01-23 PROBLEM — R50.9 FEVER: Status: ACTIVE | Noted: 2019-01-23

## 2019-01-23 PROBLEM — R50.9 FEVER OF UNKNOWN ORIGIN (FUO): Status: ACTIVE | Noted: 2019-01-23

## 2019-01-23 LAB
ALBUMIN SERPL BCP-MCNC: 3.3 G/DL
ALP SERPL-CCNC: 46 U/L
ALT SERPL W/O P-5'-P-CCNC: 15 U/L
ANION GAP SERPL CALC-SCNC: 8 MMOL/L
AORTIC ATHEROMA: YES
APTT BLDCRRT: 38 SEC
APTT BLDCRRT: 40.6 SEC
APTT BLDCRRT: 44.4 SEC
AST SERPL-CCNC: 20 U/L
BASOPHILS # BLD AUTO: 0.01 K/UL
BASOPHILS NFR BLD: 0.1 %
BILIRUB SERPL-MCNC: 0.8 MG/DL
BILIRUB UR QL STRIP: ABNORMAL
BUN SERPL-MCNC: 21 MG/DL
CALCIUM SERPL-MCNC: 9.2 MG/DL
CHLORIDE SERPL-SCNC: 106 MMOL/L
CHOLEST SERPL-MCNC: 138 MG/DL
CHOLEST/HDLC SERPL: 3 {RATIO}
CLARITY UR: CLEAR
CO2 SERPL-SCNC: 23 MMOL/L
COLOR UR: YELLOW
CREAT SERPL-MCNC: 1.1 MG/DL
DIFFERENTIAL METHOD: ABNORMAL
EOSINOPHIL # BLD AUTO: 0 K/UL
EOSINOPHIL NFR BLD: 0 %
ERYTHROCYTE [DISTWIDTH] IN BLOOD BY AUTOMATED COUNT: 13 %
EST. GFR  (AFRICAN AMERICAN): >60 ML/MIN/1.73 M^2
EST. GFR  (NON AFRICAN AMERICAN): >60 ML/MIN/1.73 M^2
GLUCOSE SERPL-MCNC: 135 MG/DL
GLUCOSE UR QL STRIP: NEGATIVE
HCT VFR BLD AUTO: 43 %
HDLC SERPL-MCNC: 46 MG/DL
HDLC SERPL: 33.3 %
HGB BLD-MCNC: 14.4 G/DL
HGB UR QL STRIP: NEGATIVE
INFLUENZA A, MOLECULAR: NEGATIVE
INFLUENZA B, MOLECULAR: NEGATIVE
KETONES UR QL STRIP: ABNORMAL
LDLC SERPL CALC-MCNC: 80.8 MG/DL
LEUKOCYTE ESTERASE UR QL STRIP: NEGATIVE
LYMPHOCYTES # BLD AUTO: 1.1 K/UL
LYMPHOCYTES NFR BLD: 9.3 %
MCH RBC QN AUTO: 30.9 PG
MCHC RBC AUTO-ENTMCNC: 33.5 G/DL
MCV RBC AUTO: 92 FL
MITRAL VALVE MOBILITY: NORMAL
MITRAL VALVE REGURGITATION: ABNORMAL
MONOCYTES # BLD AUTO: 0.5 K/UL
MONOCYTES NFR BLD: 4 %
NEUTROPHILS # BLD AUTO: 10 K/UL
NEUTROPHILS NFR BLD: 86.6 %
NITRITE UR QL STRIP: NEGATIVE
NONHDLC SERPL-MCNC: 92 MG/DL
PH UR STRIP: 6 [PH] (ref 5–8)
PLATELET # BLD AUTO: 178 K/UL
PMV BLD AUTO: 9.6 FL
POTASSIUM SERPL-SCNC: 4.3 MMOL/L
PROT SERPL-MCNC: 6.6 G/DL
PROT UR QL STRIP: NEGATIVE
RBC # BLD AUTO: 4.66 M/UL
RETIRED EF AND QEF - SEE NOTES: 55 (ref 55–65)
SODIUM SERPL-SCNC: 137 MMOL/L
SP GR UR STRIP: >=1.03 (ref 1–1.03)
SPECIMEN SOURCE: NORMAL
TRICUSPID VALVE REGURGITATION: ABNORMAL
TRIGL SERPL-MCNC: 56 MG/DL
URN SPEC COLLECT METH UR: ABNORMAL
UROBILINOGEN UR STRIP-ACNC: 1 EU/DL
WBC # BLD AUTO: 11.57 K/UL

## 2019-01-23 PROCEDURE — 25000003 PHARM REV CODE 250: Performed by: NURSE PRACTITIONER

## 2019-01-23 PROCEDURE — 81003 URINALYSIS AUTO W/O SCOPE: CPT

## 2019-01-23 PROCEDURE — 36415 COLL VENOUS BLD VENIPUNCTURE: CPT

## 2019-01-23 PROCEDURE — 85025 COMPLETE CBC W/AUTO DIFF WBC: CPT

## 2019-01-23 PROCEDURE — 63600175 PHARM REV CODE 636 W HCPCS: Performed by: NURSE ANESTHETIST, CERTIFIED REGISTERED

## 2019-01-23 PROCEDURE — 80053 COMPREHEN METABOLIC PANEL: CPT

## 2019-01-23 PROCEDURE — G8978 MOBILITY CURRENT STATUS: HCPCS | Mod: CI

## 2019-01-23 PROCEDURE — 85730 THROMBOPLASTIN TIME PARTIAL: CPT

## 2019-01-23 PROCEDURE — 25000003 PHARM REV CODE 250: Performed by: EMERGENCY MEDICINE

## 2019-01-23 PROCEDURE — 80061 LIPID PANEL: CPT

## 2019-01-23 PROCEDURE — 97161 PT EVAL LOW COMPLEX 20 MIN: CPT

## 2019-01-23 PROCEDURE — 93312 ECHO TRANSESOPHAGEAL: CPT | Mod: 26,,, | Performed by: INTERNAL MEDICINE

## 2019-01-23 PROCEDURE — 96366 THER/PROPH/DIAG IV INF ADDON: CPT | Performed by: EMERGENCY MEDICINE

## 2019-01-23 PROCEDURE — 87502 INFLUENZA DNA AMP PROBE: CPT

## 2019-01-23 PROCEDURE — G8979 MOBILITY GOAL STATUS: HCPCS | Mod: CH

## 2019-01-23 PROCEDURE — 93312 ECHO TRANSESOPHAGEAL: CPT | Performed by: INTERNAL MEDICINE

## 2019-01-23 PROCEDURE — 93312 TRANSESOPHAGEAL ECHO: ICD-10-PCS | Mod: 26,,, | Performed by: INTERNAL MEDICINE

## 2019-01-23 PROCEDURE — 93320 DOPPLER ECHO COMPLETE: CPT | Performed by: INTERNAL MEDICINE

## 2019-01-23 PROCEDURE — 93010 EKG 12-LEAD: ICD-10-PCS | Mod: 59,,, | Performed by: INTERNAL MEDICINE

## 2019-01-23 PROCEDURE — 93325 DOPPLER ECHO COLOR FLOW MAPG: CPT | Mod: 26,,, | Performed by: INTERNAL MEDICINE

## 2019-01-23 PROCEDURE — 25000003 PHARM REV CODE 250: Performed by: NURSE ANESTHETIST, CERTIFIED REGISTERED

## 2019-01-23 PROCEDURE — 97530 THERAPEUTIC ACTIVITIES: CPT

## 2019-01-23 PROCEDURE — 87040 BLOOD CULTURE FOR BACTERIA: CPT | Mod: 59

## 2019-01-23 PROCEDURE — 85730 THROMBOPLASTIN TIME PARTIAL: CPT | Mod: 91

## 2019-01-23 PROCEDURE — 93325 TRANSESOPHAGEAL ECHO: ICD-10-PCS | Mod: 26,,, | Performed by: INTERNAL MEDICINE

## 2019-01-23 PROCEDURE — 21400001 HC TELEMETRY ROOM

## 2019-01-23 PROCEDURE — 63600175 PHARM REV CODE 636 W HCPCS: Performed by: NURSE PRACTITIONER

## 2019-01-23 PROCEDURE — 93010 ELECTROCARDIOGRAM REPORT: CPT | Mod: 59,,, | Performed by: INTERNAL MEDICINE

## 2019-01-23 PROCEDURE — 96376 TX/PRO/DX INJ SAME DRUG ADON: CPT | Performed by: EMERGENCY MEDICINE

## 2019-01-23 PROCEDURE — 93320 DOPPLER ECHO COMPLETE: CPT | Mod: 26,,, | Performed by: INTERNAL MEDICINE

## 2019-01-23 PROCEDURE — 93320 TRANSESOPHAGEAL ECHO: ICD-10-PCS | Mod: 26,,, | Performed by: INTERNAL MEDICINE

## 2019-01-23 PROCEDURE — 93005 ELECTROCARDIOGRAM TRACING: CPT

## 2019-01-23 PROCEDURE — 93325 DOPPLER ECHO COLOR FLOW MAPG: CPT | Performed by: INTERNAL MEDICINE

## 2019-01-23 PROCEDURE — 37000008 HC ANESTHESIA 1ST 15 MINUTES: Performed by: INTERNAL MEDICINE

## 2019-01-23 RX ORDER — LIDOCAINE HYDROCHLORIDE 10 MG/ML
INJECTION INFILTRATION; PERINEURAL
Status: DISCONTINUED | OUTPATIENT
Start: 2019-01-23 | End: 2019-01-23

## 2019-01-23 RX ORDER — METOPROLOL TARTRATE 25 MG/1
12.5 TABLET ORAL ONCE
Status: COMPLETED | OUTPATIENT
Start: 2019-01-23 | End: 2019-01-23

## 2019-01-23 RX ORDER — PROPOFOL 10 MG/ML
VIAL (ML) INTRAVENOUS
Status: DISCONTINUED | OUTPATIENT
Start: 2019-01-23 | End: 2019-01-23

## 2019-01-23 RX ORDER — RAMELTEON 8 MG/1
8 TABLET ORAL NIGHTLY PRN
Status: DISCONTINUED | OUTPATIENT
Start: 2019-01-23 | End: 2019-01-25 | Stop reason: HOSPADM

## 2019-01-23 RX ADMIN — APIXABAN 5 MG: 2.5 TABLET, FILM COATED ORAL at 08:01

## 2019-01-23 RX ADMIN — LIDOCAINE HYDROCHLORIDE 100 MG: 10 INJECTION, SOLUTION INFILTRATION; PERINEURAL at 11:01

## 2019-01-23 RX ADMIN — APIXABAN 5 MG: 2.5 TABLET, FILM COATED ORAL at 01:01

## 2019-01-23 RX ADMIN — ACETAMINOPHEN 650 MG: 325 TABLET ORAL at 09:01

## 2019-01-23 RX ADMIN — SODIUM CHLORIDE: 0.9 INJECTION, SOLUTION INTRAVENOUS at 11:01

## 2019-01-23 RX ADMIN — METOPROLOL TARTRATE 25 MG: 25 TABLET ORAL at 08:01

## 2019-01-23 RX ADMIN — PROPOFOL 70 MG: 10 INJECTION, EMULSION INTRAVENOUS at 11:01

## 2019-01-23 RX ADMIN — ASPIRIN 81 MG: 81 TABLET, COATED ORAL at 09:01

## 2019-01-23 RX ADMIN — RAMELTEON 8 MG: 8 TABLET, FILM COATED ORAL at 08:01

## 2019-01-23 RX ADMIN — METOPROLOL TARTRATE 12.5 MG: 25 TABLET, FILM COATED ORAL at 12:01

## 2019-01-23 RX ADMIN — METOPROLOL TARTRATE 25 MG: 25 TABLET ORAL at 09:01

## 2019-01-23 RX ADMIN — ACETAMINOPHEN 650 MG: 325 TABLET ORAL at 07:01

## 2019-01-23 RX ADMIN — PROPOFOL 30 MG: 10 INJECTION, EMULSION INTRAVENOUS at 11:01

## 2019-01-23 RX ADMIN — HEPARIN SODIUM AND DEXTROSE 12 UNITS/KG/HR: 10000; 5 INJECTION INTRAVENOUS at 09:01

## 2019-01-23 NOTE — PLAN OF CARE
Problem: Adult Inpatient Plan of Care  Goal: Plan of Care Review  Outcome: Ongoing (interventions implemented as appropriate)  POC reviewed with pt, verbalized understanding. Pt remained free from falls, fall precautions in place. Pt is a fib on monitor. VS monitored. Pt independent and able to turn self.  Pt IV intact, infusing heparin and NS. No other c/o at this time. Call bell and personal belongings within reach. Hourly rounding complete. Reminded to call for assistance. Will continue to monitor.

## 2019-01-23 NOTE — PROGRESS NOTES
[facility] Hospital Medicine  Progress Note    Patient Name: Bi Jessica  MRN: [id number]  Patient Class: IP- Inpatient   Admission Date: 1/22/2019  Length of Stay: 0 days  Attending Physician: Hemalatha Ragland MD  Primary Care Provider: Thee Zarate MD        Subjective:     Principal Problem:Syncope    HPI:  Ms Jessica is a 78 year old male with PMHx of DM and HTN who presented to [facility] Emergency Room after being seen at [facility] today. Patient reports getting up to use the bathroom, lost consciousness and passed out. He was on the floor brief before going back to bed. Patient also laceration to left forehead. Associated symptoms include increase nauseas, weakness and fatigue for the pass month. Denies associated symptoms of chest pain, shortness of breath, fever, chills, nauseas, vomiting or diaphoresis. CT of the Head today showed no acute intracranial abnormality seen. EKG: showed A Fib. Patient denies any history of A Fib. He is being placed in observation, under the care of Hospital Medicine.     Hospital Course:  The pt is a 79 yo male with HTN, HLD, Dm who was placed in observation for syncope, severe fatigue, and new onset Afib. EKG showed Afib rate 89 with nonspecific T wave abnormality. Serial troponin normal. Cardiac echo showed normal LVEF, diastolic dysfunction. Carotid U/S showed No evidence of hemodynamically significant stenosis. BP low normal- no significant orthostasis.   Pt was placed on Lopressor and IV heparin. GANESH done which showed OSCAR thrombus. Cardiology recommended Start Eliquis 5mg BID, Cont Lopressor 25mg BID, repeat GANESH in 4 weeks,a nd  Follow up with Dr. Pandey in 1-2 weeks.         Pt had Temp 102.3F today. WBC normal. Influenza negative. CXR clear. UA and Blood cultures pending.   Pt complains of fatigue         Interval History: + severe fatigue   Review of Systems   Constitutional: Positive for fatigue and fever. Negative for appetite change, chills and  diaphoresis.   HENT: Negative for congestion, nosebleeds, sore throat and trouble swallowing.    Eyes: Negative for pain, discharge and visual disturbance.   Respiratory: Negative for apnea, cough, chest tightness, shortness of breath, wheezing and stridor.    Cardiovascular: Negative for chest pain, palpitations and leg swelling.   Gastrointestinal: Negative for abdominal distention, abdominal pain, blood in stool, constipation, diarrhea, nausea and vomiting.   Endocrine: Negative for cold intolerance and heat intolerance.   Genitourinary: Negative for difficulty urinating, dysuria, flank pain, frequency and urgency.   Musculoskeletal: Negative for arthralgias, back pain, joint swelling, myalgias, neck pain and neck stiffness.   Skin: Negative for rash and wound.   Allergic/Immunologic: Negative for food allergies and immunocompromised state.   Neurological: Negative for dizziness, seizures, syncope, facial asymmetry, weakness, light-headedness and headaches.   Hematological: Negative for adenopathy.   Psychiatric/Behavioral: Negative for agitation, behavioral problems and confusion. The patient is not nervous/anxious.      Objective:     Vital Signs (Most Recent):  Temp: 98.2 °F (36.8 °C) (01/23/19 1207)  Pulse: 79 (01/23/19 1209)  Resp: 17 (01/23/19 0732)  BP: 109/62 (01/23/19 1209)  SpO2: 97 % (01/23/19 1209) Vital Signs (24h Range):  Temp:  [98 °F (36.7 °C)-102.3 °F (39.1 °C)] 98.2 °F (36.8 °C)  Pulse:  [] 79  Resp:  [17-24] 17  SpO2:  [94 %-100 %] 97 %  BP: ()/(56-74) 109/62     Weight: 99.8 kg (220 lb 0.3 oz)  Body mass index is 34.46 kg/m².    Intake/Output Summary (Last 24 hours) at 1/23/2019 1227  Last data filed at 1/23/2019 1131  Gross per 24 hour   Intake 1599.2 ml   Output 250 ml   Net 1349.2 ml      Physical Exam   Constitutional: He is oriented to person, place, and time. He appears well-developed and well-nourished.   HENT:   Head: Normocephalic and atraumatic.   Nose: Nose normal.    Mouth/Throat: Oropharynx is clear and moist.   Eyes: EOM are normal. Pupils are equal, round, and reactive to light. No scleral icterus.   Neck: Normal range of motion. Neck supple.   Cardiovascular: Normal rate, normal heart sounds and intact distal pulses. Exam reveals no gallop and no friction rub.   No murmur heard.  Irregular rhythm    Pulmonary/Chest: Effort normal and breath sounds normal. No respiratory distress. He has no wheezes.   Abdominal: Soft. Bowel sounds are normal. He exhibits no distension. There is no tenderness.   Musculoskeletal: Normal range of motion. He exhibits no edema.   Neurological: He is alert and oriented to person, place, and time. No cranial nerve deficit.   Skin: Skin is warm and dry. No rash noted.   Psychiatric: He has a normal mood and affect. His behavior is normal.   Nursing note and vitals reviewed.      Significant Labs:   BMP:   Recent Labs   Lab 01/23/19  0426   *      K 4.3      CO2 23   BUN 21   CREATININE 1.1   CALCIUM 9.2     CBC:   Recent Labs   Lab 01/22/19  1620 01/23/19  0426   WBC 10.68 11.57   HGB 15.2 14.4   HCT 44.1 43.0    178     CMP:   Recent Labs   Lab 01/22/19  1620 01/23/19  0426    137   K 4.6 4.3    106   CO2 22* 23   * 135*   BUN 22 21   CREATININE 1.1 1.1   CALCIUM 9.4 9.2   PROT 6.9 6.6   ALBUMIN 3.5 3.3*   BILITOT 1.0 0.8   ALKPHOS 47* 46*   AST 19 20   ALT 19 15   ANIONGAP 10 8   EGFRNONAA >60 >60       Significant Imaging:  Imaging Results          US Carotid Bilateral (Final result)  Result time 01/22/19 18:01:22    Final result by Darek Godfrey MD (01/22/19 18:01:22)                 Impression:      No evidence of a hemodynamically significant carotid bifurcation stenosis.      Electronically signed by: Darek Godfrey MD  Date:    01/22/2019  Time:    18:01             Narrative:    EXAMINATION:  US CAROTID BILATERAL    CLINICAL HISTORY:  syncope;    TECHNIQUE:  Grayscale and color Doppler ultrasound  examination of the carotid and vertebral artery systems bilaterally.  Stenosis estimates are per the NASCET measurement criteria.    COMPARISON:  None.    FINDINGS:  Right:    Internal Carotid Artery (ICA) peak systolic velocity 62.5 cm/sec    ICA/CCA peak systolic velocity ratio: 1    Plaque formation: No significant    Vertebral artery: Antegrade flow and normal waveform.    Left:    Internal Carotid Artery (ICA)  peak systolic velocity 58.2 cm/sec    ICA/CCA peak systolic velocity ratio: 0.9    Plaque formation: No significant    Vertebral artery: Antegrade flow and normal waveform.                              Assessment/Plan:      * Syncope    Likely secondary to Afib and low BP  Infectious work up in progress  OP f/u with Cardiology         New onset a-fib    GANESH showed OSCAR thrombus   DCCV not attempted. Cardiology recommends Start Eliquis 5mg BID, Cont Lopressor 25mg BID, repeat GANESH in 4 weeks, and  Follow up with Dr. jo in cardiology clinic in 1-2 weeks.            Thrombus of left atrial appendage    See above        Fever    CXR clear  Flu swab negative  UA and blood cultures pending          Fever of unknown origin (FUO)    See above        Diabetes mellitus type II, controlled    Accu check with SSI  Diabetic diet        Hypertension associated with diabetes    Continue B blocker   Monitor        Hyperlipidemia associated with type 2 diabetes mellitus             VTE Risk Mitigation (From admission, onward)        Ordered     apixaban tablet 5 mg  2 times daily      01/23/19 1219     IP VTE HIGH RISK PATIENT  Once      01/22/19 1348     Place sequential compression device  Until discontinued      01/22/19 1348              Rebecca Dhaliwal NP  Department of Hospital Medicine   [facility]

## 2019-01-23 NOTE — ANESTHESIA PREPROCEDURE EVALUATION
01/23/2019  Bi Jessica is a 78 y.o., male.    Pre-op Assessment    I have reviewed the Patient Summary Reports.     I have reviewed the Nursing Notes.   I have reviewed the Medications.     Review of Systems  Anesthesia Hx:  No problems with previous Anesthesia Denies Hx of Anesthetic complications  History of prior surgery of interest to airway management or planning: Previous anesthesia: General, MAC Denies Family Hx of Anesthesia complications.   Denies Personal Hx of Anesthesia complications.   Social:  Former Smoker, Social Alcohol Use    Hematology/Oncology:  Hematology Normal   Oncology Normal     EENT/Dental:EENT/Dental Normal   Cardiovascular:   Hypertension, well controlled Dysrhythmias atrial fibrillation hyperlipidemia CONCLUSIONS     1 - Normal left ventricular systolic function (EF 55-60%).     2 - Indeterminate LV diastolic function.     3 - Normal right ventricular systolic function .     4 - No wall motion abnormalities.     5 - Concentric remodeling.     6 - The estimated PA systolic pressure is 39 mmHg.     7 - Mild tricuspid regurgitation.    Pulmonary:  Pulmonary Normal    Renal/:  Renal/ Normal     Hepatic/GI:  Hepatic/GI Normal  Bowel Conditions:  Bowel Neoplasm:, Adenomatous Polyp    Musculoskeletal:  Musculoskeletal Normal    Neurological:  Neurology Normal    Endocrine:   Diabetes, well controlled, type 2 Denies Hypothyroidism. Denies Hyperthyroidism.    Dermatological:  Skin Normal    Psych:   Psychiatric History anxiety depression          Physical Exam  General:  Well nourished    Airway/Jaw/Neck:  Airway Findings: Mouth Opening: Normal Tongue: Normal  General Airway Assessment: Adult  Mallampati: II  TM Distance: Normal, at least 6 cm      Dental:  Dental Findings: In tact   Chest/Lungs:  Chest/Lungs Findings: Clear to auscultation, Normal Respiratory Rate      Heart/Vascular:  Heart Findings: Rate: Normal  Rhythm: Irregularly Irregular  Sounds: Normal        Mental Status:  Mental Status Findings:  Cooperative, Alert and Oriented         Anesthesia Plan  Type of Anesthesia, risks & benefits discussed:  Anesthesia Type:  MAC  Patient's Preference:   Intra-op Monitoring Plan: standard ASA monitors  Intra-op Monitoring Plan Comments:   Post Op Pain Control Plan:   Post Op Pain Control Plan Comments:   Induction:   IV  Beta Blocker:  Patient is not currently on a Beta-Blocker (No further documentation required).       Informed Consent: Patient understands risks and agrees with Anesthesia plan.  Questions answered. Anesthesia consent signed with patient.  ASA Score: 3     Day of Surgery Review of History & Physical: I have interviewed and examined the patient. I have reviewed the patient's H&P dated:            Ready For Surgery From Anesthesia Perspective.

## 2019-01-23 NOTE — PLAN OF CARE
Problem: Adult Inpatient Plan of Care  Goal: Plan of Care Review  Outcome: Ongoing (interventions implemented as appropriate)  Pt AAO x4.  VSS.  Pt had temp with AM vitals/  IV fluids administered per order.   Pt remained free of falls this shift.   Pt has no complaints of pain this shift.  Blood glucose monitored, corrected via SSI.  Plan of care reviewed. Patient verbalizes understanding.   Pt moving/turing independently. Frequent weight shifting encouraged.  Patient afib on monitor.   Pt had GANESH today with no cardioversion due to clot.   Heparin stopped and eliquis started.   Bed low, side rails up x 2, wheels locked, call light in reach.   Bed alarm maintained for safety.   Patient instructed to call for assistance.   Hourly rounding completed.   24 hour chart check completed.  Will continue to monitor.

## 2019-01-23 NOTE — PLAN OF CARE
Problem: Adult Inpatient Plan of Care  Goal: Plan of Care Review  Outcome: Ongoing (interventions implemented as appropriate)  POC reviewed, verbalized understanding. Pt remained free from falls, fall precautions in place. Pt is Afib on monitor, . BP monitored. No other c/o at this time. PIV intact, fluids & heparin gtt infusing. PTT monitored. Pt NPO after midnight for TTE & Cardioversion today. Call bell and personal belongings within reach. Hourly rounding complete. Reminded to call for assistance. Will continue to monitor.

## 2019-01-23 NOTE — ANESTHESIA RELEASE NOTE
"Anesthesia Release from PACU Note    Patient: Bi Jessica    Procedure(s) Performed: Procedure(s) (LRB):  CARDIOVERSION (N/A)  ECHOCARDIOGRAM,TRANSESOPHAGEAL (N/A)    Anesthesia type: MAC    Post pain: Adequate analgesia    Post assessment: no apparent anesthetic complications, tolerated procedure well and no evidence of recall    Last Vitals:   Visit Vitals  /66 (Patient Position: Lying)   Pulse 85   Temp 37.4 °C (99.4 °F) (Oral)   Resp 17   Ht 5' 7" (1.702 m)   Wt 99.8 kg (220 lb 0.3 oz)   SpO2 96%   BMI 34.46 kg/m²       Post vital signs: stable    Level of consciousness: awake, alert  and oriented    Nausea/Vomiting: no nausea/no vomiting    Complications: none    Airway Patency: patent    Respiratory: unassisted, spontaneous ventilation, room air    Cardiovascular: stable and blood pressure at baseline    Hydration: euvolemic  "

## 2019-01-23 NOTE — ASSESSMENT & PLAN NOTE
GANESH showed OSCAR thrombus   DCCV not attempted. Cardiology recommends Start Eliquis 5mg BID, Cont Lopressor 25mg BID, repeat GANESH in 4 weeks, and  Follow up with Dr. jo in cardiology clinic in 1-2 weeks.

## 2019-01-23 NOTE — TRANSFER OF CARE
"Anesthesia Transfer of Care Note    Patient: Bi Jessica    Procedure(s) Performed: Procedure(s) (LRB):  CARDIOVERSION (N/A)  ECHOCARDIOGRAM,TRANSESOPHAGEAL (N/A)    Patient location: Cath Lab    Anesthesia Type: MAC    Transport from OR: Transported from OR on room air with adequate spontaneous ventilation    Post pain: adequate analgesia    Post assessment: no apparent anesthetic complications    Post vital signs: stable    Level of consciousness: awake and alert    Nausea/Vomiting: no nausea/vomiting    Complications: none    Transfer of care protocol was followed      Last vitals:   Visit Vitals  /66 (Patient Position: Lying)   Pulse 85   Temp 37.4 °C (99.4 °F) (Oral)   Resp 17   Ht 5' 7" (1.702 m)   Wt 99.8 kg (220 lb 0.3 oz)   SpO2 96%   BMI 34.46 kg/m²     "

## 2019-01-23 NOTE — PROGRESS NOTES
Pts BP at 1950 was 98/58, HR 60-70s Afib. Metoprolol held per NEEL Oswald. Orders to recheck at midnight. Midnight /68, HR  A fib. Orders to give metoprolol 12.5mg po.

## 2019-01-23 NOTE — ANESTHESIA POSTPROCEDURE EVALUATION
"Anesthesia Post Evaluation    Patient: Bi Jessica    Procedure(s) Performed: Procedure(s) (LRB):  CARDIOVERSION (N/A)  ECHOCARDIOGRAM,TRANSESOPHAGEAL (N/A)    Final Anesthesia Type: MAC  Patient location during evaluation: Cath Lab  Patient participation: Yes- Able to Participate  Level of consciousness: awake and alert and oriented  Post-procedure vital signs: reviewed and stable  Pain management: adequate  Airway patency: patent  PONV status at discharge: No PONV  Anesthetic complications: no      Cardiovascular status: hemodynamically stable and blood pressure returned to baseline  Respiratory status: room air, unassisted and spontaneous ventilation  Hydration status: euvolemic  Follow-up not needed.        Visit Vitals  /66 (Patient Position: Lying)   Pulse 85   Temp 37.4 °C (99.4 °F) (Oral)   Resp 17   Ht 5' 7" (1.702 m)   Wt 99.8 kg (220 lb 0.3 oz)   SpO2 96%   BMI 34.46 kg/m²       Pain/Laura Score: Pain Rating Prior to Med Admin: 0 (1/23/2019  7:33 AM)  Pain Rating Post Med Admin: 0 (1/23/2019  8:33 AM)        "

## 2019-01-23 NOTE — SUBJECTIVE & OBJECTIVE
Interval History: + severe fatigue   Review of Systems   Constitutional: Positive for fatigue and fever. Negative for appetite change, chills and diaphoresis.   HENT: Negative for congestion, nosebleeds, sore throat and trouble swallowing.    Eyes: Negative for pain, discharge and visual disturbance.   Respiratory: Negative for apnea, cough, chest tightness, shortness of breath, wheezing and stridor.    Cardiovascular: Negative for chest pain, palpitations and leg swelling.   Gastrointestinal: Negative for abdominal distention, abdominal pain, blood in stool, constipation, diarrhea, nausea and vomiting.   Endocrine: Negative for cold intolerance and heat intolerance.   Genitourinary: Negative for difficulty urinating, dysuria, flank pain, frequency and urgency.   Musculoskeletal: Negative for arthralgias, back pain, joint swelling, myalgias, neck pain and neck stiffness.   Skin: Negative for rash and wound.   Allergic/Immunologic: Negative for food allergies and immunocompromised state.   Neurological: Negative for dizziness, seizures, syncope, facial asymmetry, weakness, light-headedness and headaches.   Hematological: Negative for adenopathy.   Psychiatric/Behavioral: Negative for agitation, behavioral problems and confusion. The patient is not nervous/anxious.      Objective:     Vital Signs (Most Recent):  Temp: 98.2 °F (36.8 °C) (01/23/19 1207)  Pulse: 79 (01/23/19 1209)  Resp: 17 (01/23/19 0732)  BP: 109/62 (01/23/19 1209)  SpO2: 97 % (01/23/19 1209) Vital Signs (24h Range):  Temp:  [98 °F (36.7 °C)-102.3 °F (39.1 °C)] 98.2 °F (36.8 °C)  Pulse:  [] 79  Resp:  [17-24] 17  SpO2:  [94 %-100 %] 97 %  BP: ()/(56-74) 109/62     Weight: 99.8 kg (220 lb 0.3 oz)  Body mass index is 34.46 kg/m².    Intake/Output Summary (Last 24 hours) at 1/23/2019 1227  Last data filed at 1/23/2019 1131  Gross per 24 hour   Intake 1599.2 ml   Output 250 ml   Net 1349.2 ml      Physical Exam   Constitutional: He is oriented  to person, place, and time. He appears well-developed and well-nourished.   HENT:   Head: Normocephalic and atraumatic.   Nose: Nose normal.   Mouth/Throat: Oropharynx is clear and moist.   Eyes: EOM are normal. Pupils are equal, round, and reactive to light. No scleral icterus.   Neck: Normal range of motion. Neck supple.   Cardiovascular: Normal rate, normal heart sounds and intact distal pulses. Exam reveals no gallop and no friction rub.   No murmur heard.  Irregular rhythm    Pulmonary/Chest: Effort normal and breath sounds normal. No respiratory distress. He has no wheezes.   Abdominal: Soft. Bowel sounds are normal. He exhibits no distension. There is no tenderness.   Musculoskeletal: Normal range of motion. He exhibits no edema.   Neurological: He is alert and oriented to person, place, and time. No cranial nerve deficit.   Skin: Skin is warm and dry. No rash noted.   Psychiatric: He has a normal mood and affect. His behavior is normal.   Nursing note and vitals reviewed.      Significant Labs:   BMP:   Recent Labs   Lab 01/23/19  0426   *      K 4.3      CO2 23   BUN 21   CREATININE 1.1   CALCIUM 9.2     CBC:   Recent Labs   Lab 01/22/19  1620 01/23/19  0426   WBC 10.68 11.57   HGB 15.2 14.4   HCT 44.1 43.0    178     CMP:   Recent Labs   Lab 01/22/19  1620 01/23/19  0426    137   K 4.6 4.3    106   CO2 22* 23   * 135*   BUN 22 21   CREATININE 1.1 1.1   CALCIUM 9.4 9.2   PROT 6.9 6.6   ALBUMIN 3.5 3.3*   BILITOT 1.0 0.8   ALKPHOS 47* 46*   AST 19 20   ALT 19 15   ANIONGAP 10 8   EGFRNONAA >60 >60       Significant Imaging:  Imaging Results          US Carotid Bilateral (Final result)  Result time 01/22/19 18:01:22    Final result by Darek Godfrey MD (01/22/19 18:01:22)                 Impression:      No evidence of a hemodynamically significant carotid bifurcation stenosis.      Electronically signed by: Darek Godfrey MD  Date:    01/22/2019  Time:    18:01              Narrative:    EXAMINATION:  US CAROTID BILATERAL    CLINICAL HISTORY:  syncope;    TECHNIQUE:  Grayscale and color Doppler ultrasound examination of the carotid and vertebral artery systems bilaterally.  Stenosis estimates are per the NASCET measurement criteria.    COMPARISON:  None.    FINDINGS:  Right:    Internal Carotid Artery (ICA) peak systolic velocity 62.5 cm/sec    ICA/CCA peak systolic velocity ratio: 1    Plaque formation: No significant    Vertebral artery: Antegrade flow and normal waveform.    Left:    Internal Carotid Artery (ICA)  peak systolic velocity 58.2 cm/sec    ICA/CCA peak systolic velocity ratio: 0.9    Plaque formation: No significant    Vertebral artery: Antegrade flow and normal waveform.

## 2019-01-23 NOTE — INTERVAL H&P NOTE
The patient has been examined and the H&P has been reviewed:    I concur with the findings and no changes have occurred since H&P was written.    Anesthesia/Surgery risks, benefits and alternative options discussed and understood by patient/family.    GANESH/DCCV today      Active Hospital Problems    Diagnosis  POA    *New onset a-fib [I48.91]  Yes    Syncope [R55]  Yes    Diabetes mellitus type II, controlled [E11.9]  Yes    Hypertension associated with diabetes [E11.59, I10]  Yes      Resolved Hospital Problems   No resolved problems to display.

## 2019-01-23 NOTE — PT/OT/SLP EVAL
Physical Therapy Evaluation and Discharge Note    Patient Name:  Bi Jessica   MRN:  [id number]    Recommendations:     Discharge Recommendations:  (home with family)   Discharge Equipment Recommendations: none   Barriers to discharge: None    Assessment:     Bi Jessica is a 78 y.o. male admitted with a medical diagnosis of Syncope. .  At this time, patient is functioning at their prior level of function and does not require further acute PT services.     Recent Surgery: Procedure(s) (LRB):  CARDIOVERSION (N/A)  ECHOCARDIOGRAM,TRANSESOPHAGEAL (N/A) Day of Surgery    Plan:     During this hospitalization, patient does not require further acute PT services; defer to [employer] for amb maintenance.  Please re-consult if situation changes.      Subjective     Chief Complaint: needs some sleep  Patient/Family Comments/goals: to go home  Pain/Comfort:  · Pain Rating 1: 0/10    Patients cultural, spiritual, Yazidism conflicts given the current situation:      Living Environment:  Lives alone - but family closeby - 3 steps with no rails  Prior to admission, patients level of function was indep and was driving.  Equipment used at home: none.  DME owned (not currently used): none.  Upon discharge, patient will have assistance from family.    Objective:     Communicated with CARRIE Berumen prior to session.  Patient found lying in bed with son present upon PT entry to room found with: peripheral IV, telemetry     General Precautions: Standard,     Orthopedic Precautions:N/A   Braces: N/A     Exams:  · B LE grossly 4+/5 strength and rom wfl    Functional Mobility:  · All mobility sup or less - 150ft x 2 no ad and no gross lob    AM-PAC 6 CLICK MOBILITY  Total Score:20       Therapeutic Activities and Exercises:   PT educated patient/son on POC to defer to [employer], le exs to do in bed and on eob and fall/safety precautions with mobility.    AM-PAC 6 CLICK MOBILITY  Total Score:20     Patient left HOB elevated with all  lines intact, call button in reach and RN notified. Son present in room.    GOALS:   Multidisciplinary Problems     Physical Therapy Goals     Not on file                History:     Past Medical History:   Diagnosis Date    Anxiety     BPH with obstruction/lower urinary tract symptoms     Depression     Diabetes mellitus type II, controlled     Hyperlipidemia associated with type 2 diabetes mellitus     Hypertension associated with diabetes        Past Surgical History:   Procedure Laterality Date    COLONOSCOPY N/A 5/1/2017    [facility]    TONSILLECTOMY         Clinical Decision Making:     History  Co-morbidities and personal factors that may impact the plan of care Examination  Body Structures and Functions, activity limitations and participation restrictions that may impact the plan of care Clinical Presentation   Decision Making/ Complexity Score   Co-morbidities:   [] Time since onset of injury / illness / exacerbation  [] Status of current condition  []Patient's cognitive status and safety concerns    [] Multiple Medical Problems (see med hx)  Personal Factors:   [] Patient's age  [] Prior Level of function   [] Patient's home situation (environment and family support)  [] Patient's level of motivation  [] Expected progression of patient      HISTORY:(criteria)    [] 27371 - no personal factors/history    [] 46774 - has 1-2 personal factor/comorbidity     [] 06303 - has >3 personal factor/comorbidity     Body Regions:  [] Objective examination findings  [] Head     []  Neck  [] Trunk   [] Upper Extremity  [] Lower Extremity    Body Systems:  [] For communication ability, affect, cognition, language, and learning style: the assessment of the ability to make needs known, consciousness, orientation (person, place, and time), expected emotional /behavioral responses, and learning preferences (eg, learning barriers, education  needs)  [] For the neuromuscular system: a  general assessment of gross coordinated movement (eg, balance, gait, locomotion, transfers, and transitions) and motor function  (motor control and motor learning)  [] For the musculoskeletal system: the assessment of gross symmetry, gross range of motion, gross strength, height, and weight  [] For the integumentary system: the assessment of pliability(texture), presence of scar formation, skin color, and skin integrity  [] For cardiovascular/pulmonary system: the assessment of heart rate, respiratory rate, blood pressure, and edema     Activity limitations:    [] Patient's cognitive status and saf ety concerns          [] Status of current condition      [] Weight bearing restriction  [] Cardiopulmunary Restriction    Participation Restrictions:   [] Goals and goal agreement with the patient     [] Rehab potential (prognosis) and probable outcome      Examination of Body System: (criteria)    [] 69328 - addressing 1-2 elements    [] 41931 - addressing a total of 3 or more elements     [] 06586 -  Addressing a total of 4 or more elements         Clinical Presentation: (criteria)  Choose one     On examination of body system using standardized tests and measures patient presents with (CHOOSE ONE) elements from any of the following: body structures and functions, activity limitations, and/or participation restrictions.  Leading to a clinical presentation that is considered (CHOOSE ONE)                              Clinical Decision Making  (Eval Complexity):  Choose One     Time Tracking:     PT Received On: 01/23/19  PT Start Time: 1525     PT Stop Time: 1550  PT Total Time (min): 25 min     Billable Minutes: Evaluation 15 and Therapeutic Activity 10      Quentin Mascorro, PT  01/23/2019

## 2019-01-23 NOTE — BRIEF OP NOTE
Surgeon/Physician: Charbel Pandey MD     Pre Op Diagnosis: Afib r/o thrombus    Post OP Diagnosis: Positive OSCAR thrombus    Procedure Performed: Transesophageal echocardiogram     Procedure Description: The risks, benefits, and alternatives of the procedure expalined in detail with patient and family. The patient voices understanding and all questions have been addressed. The patient agrees to proceed as planned.   The patient was sedated by anesthesiologist. The probe was advanced via throat into esophagus smoothly. The patient tolerated the procedure well and there was no complications. The probed was withdrawal at the end of study. The patient was hemodynamically stable.     Estimated Blood Loss: none.     Findings / Operative Note:   Positive OSCAR thrombus noted. Full note to follow.     DCCV not attempted.     Ok to discharge home once hemodynamically stable.    Stat Eliquis 5mg BID  Cont Metoprolol 25 BID  Follow up in me in cardiology clinic in 1-2 weeks.   Will repeat GANESH/DCCV in 4 weeks.    Charbel Pandey MD, [facility]  Cardiovascular Disease  [facility], [place]  [phone] (P)

## 2019-01-23 NOTE — HOSPITAL COURSE
The pt is a 77 yo male with HTN, HLD, Dm who was placed in observation for syncope, severe fatigue, and new onset Afib. EKG showed Afib rate 89 with nonspecific T wave abnormality. Serial troponin normal. Cardiac echo showed normal LVEF, diastolic dysfunction. Carotid U/S showed No evidence of hemodynamically significant stenosis. BP low normal- no significant orthostasis.   Pt was placed on Lopressor and IV heparin. GANESH done which showed OSCAR thrombus. Cardiology recommended Start Eliquis 5mg BID, Cont Lopressor 25mg BID, repeat GANESH in 4 weeks,a nd  Follow up with Dr. Pandey in 1-2 weeks.     1/23  Pt had Temp 102.3F today. WBC normal. Influenza negative. CXR clear. UA and Blood cultures pending.   Pt complains of fatigue   1/24: fevers last night. Dec lopressor to 12.5mg since sbps in the 90s. Procal 0.26. Respiratory panel pending  1/25: Afebrile with vitals stable. Seen and examined. Deemed stable for d/c.

## 2019-01-23 NOTE — PLAN OF CARE
CM met with patient and son at the bedside. CM explained role/purpose of visit. Pt reports he lives alone and is independent with ADL's. Pt denies having HH/DME at this time. Patient denies any post hospital needs or services at this time. Transitional Care Folder, Discharge Planning Begins on Admission pamphlet, [facility], Advance Directive information given to patient along with the contact information given.Instructed patient or family to call with any questions or concerns.     Transportation: son  PCP: Dr. Thee Zarate    No needs at this time.        01/23/19 6772   Discharge Assessment   Assessment Type Discharge Planning Assessment   Confirmed/corrected address and phone number on facesheet? Yes   Assessment information obtained from? Patient   Prior to hospitilization cognitive status: Alert/Oriented   Prior to hospitalization functional status: Independent   Current cognitive status: Alert/Oriented   Current Functional Status: Independent   Lives With alone   Able to Return to Prior Arrangements yes   Who are your caregiver(s) and their phone number(s)? [place] Jessica; son [phone]   Discharge Plan A Home   Discharge Plan B Home with family   DME Needed Upon Discharge  none   Patient/Family in Agreement with Plan yes

## 2019-01-24 LAB
ALBUMIN SERPL BCP-MCNC: 2.8 G/DL
ALP SERPL-CCNC: 40 U/L
ALT SERPL W/O P-5'-P-CCNC: 16 U/L
ANION GAP SERPL CALC-SCNC: 8 MMOL/L
AST SERPL-CCNC: 28 U/L
BASOPHILS # BLD AUTO: 0.02 K/UL
BASOPHILS NFR BLD: 0.2 %
BILIRUB SERPL-MCNC: 0.8 MG/DL
BUN SERPL-MCNC: 19 MG/DL
CALCIUM SERPL-MCNC: 8.6 MG/DL
CHLORIDE SERPL-SCNC: 106 MMOL/L
CO2 SERPL-SCNC: 20 MMOL/L
CREAT SERPL-MCNC: 1.1 MG/DL
DIFFERENTIAL METHOD: ABNORMAL
EOSINOPHIL # BLD AUTO: 0 K/UL
EOSINOPHIL NFR BLD: 0 %
ERYTHROCYTE [DISTWIDTH] IN BLOOD BY AUTOMATED COUNT: 13 %
EST. GFR  (AFRICAN AMERICAN): >60 ML/MIN/1.73 M^2
EST. GFR  (NON AFRICAN AMERICAN): >60 ML/MIN/1.73 M^2
GLUCOSE SERPL-MCNC: 112 MG/DL
HCT VFR BLD AUTO: 39.9 %
HGB BLD-MCNC: 13.4 G/DL
LYMPHOCYTES # BLD AUTO: 0.8 K/UL
LYMPHOCYTES NFR BLD: 8.4 %
MCH RBC QN AUTO: 31 PG
MCHC RBC AUTO-ENTMCNC: 33.6 G/DL
MCV RBC AUTO: 92 FL
MONOCYTES # BLD AUTO: 1.1 K/UL
MONOCYTES NFR BLD: 10.5 %
NEUTROPHILS # BLD AUTO: 8.1 K/UL
NEUTROPHILS NFR BLD: 80.9 %
PLATELET # BLD AUTO: 130 K/UL
PMV BLD AUTO: 9.9 FL
POTASSIUM SERPL-SCNC: 3.8 MMOL/L
PROCALCITONIN SERPL IA-MCNC: 0.26 NG/ML
PROT SERPL-MCNC: 5.9 G/DL
RBC # BLD AUTO: 4.32 M/UL
SODIUM SERPL-SCNC: 134 MMOL/L
WBC # BLD AUTO: 9.98 K/UL

## 2019-01-24 PROCEDURE — 25000003 PHARM REV CODE 250: Performed by: NURSE PRACTITIONER

## 2019-01-24 PROCEDURE — G8989 SELF CARE D/C STATUS: HCPCS | Mod: CJ

## 2019-01-24 PROCEDURE — 99233 SBSQ HOSP IP/OBS HIGH 50: CPT | Mod: ,,, | Performed by: INTERNAL MEDICINE

## 2019-01-24 PROCEDURE — 85025 COMPLETE CBC W/AUTO DIFF WBC: CPT

## 2019-01-24 PROCEDURE — 97165 OT EVAL LOW COMPLEX 30 MIN: CPT

## 2019-01-24 PROCEDURE — 87632 RESP VIRUS 6-11 TARGETS: CPT

## 2019-01-24 PROCEDURE — 97530 THERAPEUTIC ACTIVITIES: CPT

## 2019-01-24 PROCEDURE — 25000003 PHARM REV CODE 250: Performed by: FAMILY MEDICINE

## 2019-01-24 PROCEDURE — 84145 PROCALCITONIN (PCT): CPT

## 2019-01-24 PROCEDURE — G8988 SELF CARE GOAL STATUS: HCPCS | Mod: CJ

## 2019-01-24 PROCEDURE — G8987 SELF CARE CURRENT STATUS: HCPCS | Mod: CJ

## 2019-01-24 PROCEDURE — 36415 COLL VENOUS BLD VENIPUNCTURE: CPT

## 2019-01-24 PROCEDURE — 21400001 HC TELEMETRY ROOM

## 2019-01-24 PROCEDURE — 80053 COMPREHEN METABOLIC PANEL: CPT

## 2019-01-24 PROCEDURE — 99233 PR SUBSEQUENT HOSPITAL CARE,LEVL III: ICD-10-PCS | Mod: ,,, | Performed by: INTERNAL MEDICINE

## 2019-01-24 RX ORDER — METOPROLOL TARTRATE 25 MG/1
12.5 TABLET ORAL 2 TIMES DAILY
Status: DISCONTINUED | OUTPATIENT
Start: 2019-01-24 | End: 2019-01-25 | Stop reason: HOSPADM

## 2019-01-24 RX ADMIN — RAMELTEON 8 MG: 8 TABLET, FILM COATED ORAL at 08:01

## 2019-01-24 RX ADMIN — METOPROLOL TARTRATE 12.5 MG: 25 TABLET, FILM COATED ORAL at 08:01

## 2019-01-24 RX ADMIN — SODIUM CHLORIDE: 0.9 INJECTION, SOLUTION INTRAVENOUS at 06:01

## 2019-01-24 RX ADMIN — ASPIRIN 81 MG: 81 TABLET, COATED ORAL at 10:01

## 2019-01-24 RX ADMIN — METOPROLOL TARTRATE 25 MG: 25 TABLET ORAL at 10:01

## 2019-01-24 RX ADMIN — APIXABAN 5 MG: 2.5 TABLET, FILM COATED ORAL at 08:01

## 2019-01-24 RX ADMIN — APIXABAN 5 MG: 2.5 TABLET, FILM COATED ORAL at 10:01

## 2019-01-24 NOTE — ASSESSMENT & PLAN NOTE
Continue ASA and Eliquis  Repeat GANESH in 4-6 weeks for resolution  IF remains in AFIB at that time, will attempt DCCV if OSCAR resolved

## 2019-01-24 NOTE — HOSPITAL COURSE
1/24/19-Patient seen and examined in room, sitting in bedside chair. Denies any discomfort today. Labs reviewed, K+ 3.8, Cr 1.1, Procal 0.26, BC NGTD. Remains in AFIB today. No CNS complaints to suggest TIA or CVA today. No signs of abnormal bleeding on Eliquis.

## 2019-01-24 NOTE — PROGRESS NOTES
[facility]  Cardiology  Progress Note    Patient Name: Bi Jessica  MRN: [id number]  Admission Date: 1/22/2019  Hospital Length of Stay: 1 days  Code Status: Full Code   Attending Physician: Hemalatha Ragland MD   Primary Care Physician: Thee Zarate MD  Expected Discharge Date:   Principal Problem:Syncope    Subjective:   HPI  Mr. Jessica is a 78 year old male with PMHx of HTN, HLP, DM who presented to [facility] ED due to recent syncopal episode overnight. Patient reports getting up last PM/early this AM and feeling weak and having syncopal episode afterward. Initial EKG revealed new onset AFIB, rate 89. ECHO pending. IV heparin gtt to start in ED for CVA prophylaxis. Labs pending at this time. Hospital medicine called for admission.  Cardiology consulted due to new onset AFIB. Patient seen and examined in ED. Denies chest pain or anginal equivalents at time of exam. No shortness of breath or VILLALOBOS. He does report episodes of palpitations but not bothersome. He took 2 Tylenol PM and 2 Melatonin yesterday PM to assist with sleep. Trace LE edema to day on exam. ECHO pending. Will keep NPO after MN for possible GANESH/DCCV. Further recs to follow.     Hospital Course:   1/24/19-Patient seen and examined in room, sitting in bedside chair. Denies any discomfort today. Labs reviewed, K+ 3.8, Cr 1.1, Procal 0.26, BC NGTD. Remains in AFIB today. No CNS complaints to suggest TIA or CVA today. No signs of abnormal bleeding on Eliquis.     Interval History: no acute issues o/n. Remains in AFIB today. No CNS complaints to suggest TIA or CVA today. No signs of abnormal bleeding on Eliquis.     Review of Systems   Constitution: Negative for weakness.   HENT: Negative for hearing loss and hoarse voice.    Eyes: Negative for blurred vision and visual disturbance.   Cardiovascular: Positive for irregular heartbeat, near-syncope and palpitations. Negative for chest pain, claudication, dyspnea on exertion, leg swelling,  orthopnea, paroxysmal nocturnal dyspnea and syncope.   Respiratory: Negative for cough, hemoptysis, shortness of breath, sleep disturbances due to breathing, snoring and wheezing.    Endocrine: Negative for cold intolerance and heat intolerance.   Hematologic/Lymphatic: Does not bruise/bleed easily.   Skin: Negative for color change, dry skin and nail changes.   Musculoskeletal: Positive for arthritis. Negative for back pain, joint pain and myalgias.   Gastrointestinal: Negative for bloating, abdominal pain, constipation, nausea and vomiting.   Genitourinary: Negative for dysuria, flank pain, hematuria and hesitancy.   Neurological: Negative for headaches, light-headedness, loss of balance, numbness and paresthesias.   Psychiatric/Behavioral: Negative for altered mental status.   Allergic/Immunologic: Negative for environmental allergies.     Objective:     Vital Signs (Most Recent):  Temp: 99.7 °F (37.6 °C) (01/24/19 1219)  Pulse: 79 (01/24/19 1219)  Resp: 18 (01/24/19 1219)  BP: (!) 97/59 (01/24/19 1219)  SpO2: 99 % (01/24/19 1219) Vital Signs (24h Range):  Temp:  [98 °F (36.7 °C)-102.9 °F (39.4 °C)] 99.7 °F (37.6 °C)  Pulse:  [79-95] 79  Resp:  [18] 18  SpO2:  [94 %-99 %] 99 %  BP: ()/(55-67) 97/59     Weight: 100 kg (220 lb 7.4 oz)  Body mass index is 34.53 kg/m².     SpO2: 99 %  O2 Device (Oxygen Therapy): room air      Intake/Output Summary (Last 24 hours) at 1/24/2019 1552  Last data filed at 1/24/2019 0600  Gross per 24 hour   Intake 1968.75 ml   Output 550 ml   Net 1418.75 ml       Lines/Drains/Airways     Airway                 Airway - Non-Surgical 01/23/19 1120 Nasal Cannula 1 day          Peripheral Intravenous Line                 Peripheral IV - Single Lumen 01/22/19 Left Forearm 2 days         Peripheral IV - Single Lumen 01/22/19 1621 Right Antecubital 1 day                Physical Exam   Constitutional: He is oriented to person, place, and time. He appears well-developed and well-nourished.  No distress.   HENT:   Head: Normocephalic and atraumatic.   Neck: Normal range of motion and full passive range of motion without pain. Neck supple. No JVD present.   Cardiovascular: Normal rate, S1 normal, S2 normal and intact distal pulses. An irregularly irregular rhythm present. PMI is not displaced. Exam reveals no distant heart sounds.   No murmur heard.  Pulses:       Radial pulses are 2+ on the right side, and 2+ on the left side.        Dorsalis pedis pulses are 2+ on the right side, and 2+ on the left side.   Remains in AFIB   Pulmonary/Chest: Effort normal and breath sounds normal. No accessory muscle usage. No respiratory distress. He has no decreased breath sounds. He has no wheezes. He has no rales.   Musculoskeletal: Normal range of motion. He exhibits edema (trace LE).        Right ankle: He exhibits swelling.        Left ankle: He exhibits swelling.   Neurological: He is alert and oriented to person, place, and time.   Skin: Skin is warm and dry. He is not diaphoretic. No cyanosis. Nails show no clubbing.   Psychiatric: He has a normal mood and affect. His speech is normal and behavior is normal. Judgment and thought content normal. Cognition and memory are normal.   Nursing note and vitals reviewed.      Significant Labs:   BMP:   Recent Labs   Lab 01/22/19  1620 01/23/19  0426 01/24/19  0413   * 135* 112*    137 134*   K 4.6 4.3 3.8    106 106   CO2 22* 23 20*   BUN 22 21 19   CREATININE 1.1 1.1 1.1   CALCIUM 9.4 9.2 8.6*   , CMP   Recent Labs   Lab 01/22/19  1620 01/23/19  0426 01/24/19  0413    137 134*   K 4.6 4.3 3.8    106 106   CO2 22* 23 20*   * 135* 112*   BUN 22 21 19   CREATININE 1.1 1.1 1.1   CALCIUM 9.4 9.2 8.6*   PROT 6.9 6.6 5.9*   ALBUMIN 3.5 3.3* 2.8*   BILITOT 1.0 0.8 0.8   ALKPHOS 47* 46* 40*   AST 19 20 28   ALT 19 15 16   ANIONGAP 10 8 8   ESTGFRAFRICA >60 >60 >60   EGFRNONAA >60 >60 >60   , CBC   Recent Labs   Lab 01/22/19  1625  01/23/19  0426 01/24/19  0413   WBC 10.68 11.57 9.98   HGB 15.2 14.4 13.4*   HCT 44.1 43.0 39.9*    178 130*   , INR   Recent Labs   Lab 01/22/19  1620   INR 1.0   , Lipid Panel   Recent Labs   Lab 01/23/19  0426   CHOL 138   HDL 46   LDLCALC 80.8   TRIG 56   CHOLHDL 33.3   , Troponin   Recent Labs   Lab 01/22/19  1620   TROPONINI 0.009    and All pertinent lab results from the last 24 hours have been reviewed.    Significant Imaging: Echocardiogram:   2D echo with color flow doppler:   Results for orders placed or performed during the hospital encounter of 01/22/19   2D echo with color flow doppler   Result Value Ref Range    QEF 55 55 - 65    Est. PA Systolic Pressure 39.48     Mitral Valve Mobility NORMAL     Tricuspid Valve Regurgitation MILD     and X-Ray: CXR: X-Ray Chest 1 View (CXR): No results found for this visit on 01/22/19.    Assessment and Plan:       * Syncope    Telemetry monitoring overnight       Thrombus of left atrial appendage    Continue ASA and Eliquis  Repeat GANESH in 4-6 weeks for resolution  IF remains in AFIB at that time, will attempt DCCV if OSCAR resolved     New onset a-fib    Patient presented to ED due to syncope and new onset AFIB on EKG upon arrival to ED  IV heparin gtt to be started for CVA prophylaxis today  Continue ASA, BB, IV heparin gtt for now  ECHO pending  Will keep NPO after MN for possible GANESH/DCCV in AM  No CNS complaints to suggest TIA or CVA today.  Has stigmata for Sleep apnea and needs MACK workup as OP  Further recs to follow in AM    1/24  -Continue ASA, Eliquis, BB  -NO CNS complaints to suggest TIA or CVA today  -No signs of abnormal bleeding on Eliquis  -OP Cardiology follow up for repeat GANESH with possible DCCV if OSCAR resolved     Hypertension associated with diabetes    On medical therapy   continue BB     Hyperlipidemia associated with type 2 diabetes mellitus    Continue statin         VTE Risk Mitigation (From admission, onward)        Ordered      apixaban tablet 5 mg  2 times daily      01/23/19 1219     IP VTE HIGH RISK PATIENT  Once      01/22/19 1348     Place sequential compression device  Until discontinued      01/22/19 1348          Shyla Taveras NP  Cardiology  [facility]

## 2019-01-24 NOTE — SUBJECTIVE & OBJECTIVE
Interval History: no acute issues o/n. Remains in AFIB today. No CNS complaints to suggest TIA or CVA today. No signs of abnormal bleeding on Eliquis.     Review of Systems   Constitution: Negative for weakness.   HENT: Negative for hearing loss and hoarse voice.    Eyes: Negative for blurred vision and visual disturbance.   Cardiovascular: Positive for irregular heartbeat, near-syncope and palpitations. Negative for chest pain, claudication, dyspnea on exertion, leg swelling, orthopnea, paroxysmal nocturnal dyspnea and syncope.   Respiratory: Negative for cough, hemoptysis, shortness of breath, sleep disturbances due to breathing, snoring and wheezing.    Endocrine: Negative for cold intolerance and heat intolerance.   Hematologic/Lymphatic: Does not bruise/bleed easily.   Skin: Negative for color change, dry skin and nail changes.   Musculoskeletal: Positive for arthritis. Negative for back pain, joint pain and myalgias.   Gastrointestinal: Negative for bloating, abdominal pain, constipation, nausea and vomiting.   Genitourinary: Negative for dysuria, flank pain, hematuria and hesitancy.   Neurological: Negative for headaches, light-headedness, loss of balance, numbness and paresthesias.   Psychiatric/Behavioral: Negative for altered mental status.   Allergic/Immunologic: Negative for environmental allergies.     Objective:     Vital Signs (Most Recent):  Temp: 99.7 °F (37.6 °C) (01/24/19 1219)  Pulse: 79 (01/24/19 1219)  Resp: 18 (01/24/19 1219)  BP: (!) 97/59 (01/24/19 1219)  SpO2: 99 % (01/24/19 1219) Vital Signs (24h Range):  Temp:  [98 °F (36.7 °C)-102.9 °F (39.4 °C)] 99.7 °F (37.6 °C)  Pulse:  [79-95] 79  Resp:  [18] 18  SpO2:  [94 %-99 %] 99 %  BP: ()/(55-67) 97/59     Weight: 100 kg (220 lb 7.4 oz)  Body mass index is 34.53 kg/m².     SpO2: 99 %  O2 Device (Oxygen Therapy): room air      Intake/Output Summary (Last 24 hours) at 1/24/2019 1552  Last data filed at 1/24/2019 0600  Gross per 24 hour    Intake 1968.75 ml   Output 550 ml   Net 1418.75 ml       Lines/Drains/Airways     Airway                 Airway - Non-Surgical 01/23/19 1120 Nasal Cannula 1 day          Peripheral Intravenous Line                 Peripheral IV - Single Lumen 01/22/19 Left Forearm 2 days         Peripheral IV - Single Lumen 01/22/19 1621 Right Antecubital 1 day                Physical Exam   Constitutional: He is oriented to person, place, and time. He appears well-developed and well-nourished. No distress.   HENT:   Head: Normocephalic and atraumatic.   Neck: Normal range of motion and full passive range of motion without pain. Neck supple. No JVD present.   Cardiovascular: Normal rate, S1 normal, S2 normal and intact distal pulses. An irregularly irregular rhythm present. PMI is not displaced. Exam reveals no distant heart sounds.   No murmur heard.  Pulses:       Radial pulses are 2+ on the right side, and 2+ on the left side.        Dorsalis pedis pulses are 2+ on the right side, and 2+ on the left side.   Remains in AFIB   Pulmonary/Chest: Effort normal and breath sounds normal. No accessory muscle usage. No respiratory distress. He has no decreased breath sounds. He has no wheezes. He has no rales.   Musculoskeletal: Normal range of motion. He exhibits edema (trace LE).        Right ankle: He exhibits swelling.        Left ankle: He exhibits swelling.   Neurological: He is alert and oriented to person, place, and time.   Skin: Skin is warm and dry. He is not diaphoretic. No cyanosis. Nails show no clubbing.   Psychiatric: He has a normal mood and affect. His speech is normal and behavior is normal. Judgment and thought content normal. Cognition and memory are normal.   Nursing note and vitals reviewed.      Significant Labs:   BMP:   Recent Labs   Lab 01/22/19  1620 01/23/19  0426 01/24/19  0413   * 135* 112*    137 134*   K 4.6 4.3 3.8    106 106   CO2 22* 23 20*   BUN 22 21 19   CREATININE 1.1 1.1 1.1    CALCIUM 9.4 9.2 8.6*   , CMP   Recent Labs   Lab 01/22/19  1620 01/23/19  0426 01/24/19  0413    137 134*   K 4.6 4.3 3.8    106 106   CO2 22* 23 20*   * 135* 112*   BUN 22 21 19   CREATININE 1.1 1.1 1.1   CALCIUM 9.4 9.2 8.6*   PROT 6.9 6.6 5.9*   ALBUMIN 3.5 3.3* 2.8*   BILITOT 1.0 0.8 0.8   ALKPHOS 47* 46* 40*   AST 19 20 28   ALT 19 15 16   ANIONGAP 10 8 8   ESTGFRAFRICA >60 >60 >60   EGFRNONAA >60 >60 >60   , CBC   Recent Labs   Lab 01/22/19  1620 01/23/19  0426 01/24/19  0413   WBC 10.68 11.57 9.98   HGB 15.2 14.4 13.4*   HCT 44.1 43.0 39.9*    178 130*   , INR   Recent Labs   Lab 01/22/19  1620   INR 1.0   , Lipid Panel   Recent Labs   Lab 01/23/19  0426   CHOL 138   HDL 46   LDLCALC 80.8   TRIG 56   CHOLHDL 33.3   , Troponin   Recent Labs   Lab 01/22/19  1620   TROPONINI 0.009    and All pertinent lab results from the last 24 hours have been reviewed.    Significant Imaging: Echocardiogram:   2D echo with color flow doppler:   Results for orders placed or performed during the hospital encounter of 01/22/19   2D echo with color flow doppler   Result Value Ref Range    QEF 55 55 - 65    Est. PA Systolic Pressure 39.48     Mitral Valve Mobility NORMAL     Tricuspid Valve Regurgitation MILD     and X-Ray: CXR: X-Ray Chest 1 View (CXR): No results found for this visit on 01/22/19.

## 2019-01-24 NOTE — PLAN OF CARE
01/24/19 1150   Medicare Message   Important Message from Medicare regarding Discharge Appeal Rights Given to patient/caregiver;Signed/date by patient/caregiver;Explained to patient/caregiver   Date IMM was signed 01/24/19   Time IMM was signed 1150

## 2019-01-24 NOTE — PLAN OF CARE
Problem: Adult Inpatient Plan of Care  Goal: Plan of Care Review  Outcome: Ongoing (interventions implemented as appropriate)  Pt alert and oriented. POC reviewed.IV fluids administered per orders. Neuro checks Q 4 hours.Monitor vitals. Pt remained free of falls during shift. Bed alarm set , call light in reach, room free of clutter, side rails  up x2, pt on telemetry monitor Afib, will continue to monitor.

## 2019-01-24 NOTE — PT/OT/SLP EVAL
Occupational Therapy   Evaluation and Discharge Note    Name: Bi Jessica  MRN: [id number]  Admitting Diagnosis:  Syncope 1 Day Post-Op    Recommendations:     Discharge Recommendations: (home)  Discharge Equipment Recommendations:  none  Barriers to discharge:  Decreased caregiver support    Assessment:     Bi Jessica is a 78 y.o. male with a medical diagnosis of Syncope. At this time, patient is functioning at their prior level of function and does not require further acute OT services.     Plan:     During this hospitalization, patient does not require further acute OT services.  Please re-consult if situation changes.    · Plan of Care Reviewed with: patient    Subjective     Chief Complaint:   Patient/Family Comments/goals:     Occupational Profile:  Living Environment: lives at home alone in one story house with no steps  Previous level of function: (I) with adl's and functional mobility  Roles and Routines: occupational therapy  Equipment Used at home:  none  Assistance upon Discharge:     Pain/Comfort:  · Pain Rating 1: 0/10    Patients cultural, spiritual, Anabaptist conflicts given the current situation:      Objective:     Communicated with: nurse and epic chart review prior to session.  Patient found up in chair with   upon OT entry to room.    General Precautions: Standard,     Orthopedic Precautions:N/A   Braces: N/A     Occupational Performance:    Bed Mobility:    · Patient completed Rolling/Turning to Right with supervision  · Patient completed Scooting/Bridging with supervision  · Patient completed Supine to Sit with supervision    Functional Mobility/Transfers:  · Patient completed Sit <> Stand Transfer with supervision  with  no assistive device   · Patient completed Bed <> Chair Transfer using Step Transfer technique with supervision with no assistive device  · Patient completed Toilet Transfer Step Transfer technique with supervision with  no AD  · Functional Mobility: pt ambulated 400 feet  "with sba , impulsive and fast gait.    Activities of Daily Living:  · Lower Body Dressing: supervision .  · Toileting: supervision .    Cognitive/Visual Perceptual:  Cognitive/Psychosocial Skills:     -       Oriented to: Person, Place, Time and Situation   -       Follows Commands/attention:Follows multistep  commands  -       Communication: clear/fluent  -       Memory: ,  -       Safety awareness/insight to disability: impaired   Visual/Perceptual:      -Intact .    Physical Exam:  Upper Extremity Range of Motion:     -       Right Upper Extremity: WFL  -       Left Upper Extremity: WFL  Upper Extremity Strength:    -       Right Upper Extremity: WFL  -       Left Upper Extremity: WFL   Strength:    -       Right Upper Extremity: WFL  -       Left Upper Extremity: WFL    AMPAC 6 Click ADL:  AMPAC Total Score: 22    Treatment & Education:    Education:    Patient left up in chair with all lines intact, call button in reach, chair alarm on and nurse notified    GOALS:   Multidisciplinary Problems     Occupational Therapy Goals     Not on file                History:     Past Medical History:   Diagnosis Date    Anxiety     BPH with obstruction/lower urinary tract symptoms     Depression     Diabetes mellitus type II, controlled     Hyperlipidemia associated with type 2 diabetes mellitus     Hypertension associated with diabetes        Past Surgical History:   Procedure Laterality Date    COLONOSCOPY N/A 2017    Performed by Vishal Andrade MD at [facility]    TONSILLECTOMY         Clinical Decision Makin.  OT Low:  "Pt evaluation falls under low complexity for evaluation coding due to performance deficits noted in 1-3 areas as stated above and 0 co-morbities affecting current functional status. Data obtained from problem focused assessments. No modifications or assistance was required for completion of evaluation. Only brief occupational profile and history review completed."     Time " Tracking:     OT Date of Treatment: 01/24/19  OT Start Time: 1024  OT Stop Time: 1050  OT Total Time (min): 26 min    Billable Minutes:Evaluation 10 minutes  Therapeutic Activity 16 minutes    [place] Can OT  1/24/2019

## 2019-01-24 NOTE — ASSESSMENT & PLAN NOTE
Patient presented to ED due to syncope and new onset AFIB on EKG upon arrival to ED  IV heparin gtt to be started for CVA prophylaxis today  Continue ASA, BB, IV heparin gtt for now  ECHO pending  Will keep NPO after MN for possible GANESH/DCCV in AM  No CNS complaints to suggest TIA or CVA today.  Has stigmata for Sleep apnea and needs MACK workup as OP  Further recs to follow in AM    1/24  -Continue ASA, Eliquis, BB  -NO CNS complaints to suggest TIA or CVA today  -No signs of abnormal bleeding on Eliquis  -OP Cardiology follow up for repeat GANESH with possible DCCV if OSCAR resolved

## 2019-01-25 ENCOUNTER — TELEPHONE (OUTPATIENT)
Dept: CARDIOLOGY | Facility: CLINIC | Age: 79
End: 2019-01-25

## 2019-01-25 VITALS
HEIGHT: 67 IN | SYSTOLIC BLOOD PRESSURE: 139 MMHG | DIASTOLIC BLOOD PRESSURE: 78 MMHG | HEART RATE: 85 BPM | WEIGHT: 220.69 LBS | OXYGEN SATURATION: 97 % | TEMPERATURE: 99 F | BODY MASS INDEX: 34.64 KG/M2 | RESPIRATION RATE: 18 BRPM

## 2019-01-25 LAB
ALBUMIN SERPL BCP-MCNC: 2.7 G/DL
ALP SERPL-CCNC: 41 U/L
ALT SERPL W/O P-5'-P-CCNC: 21 U/L
ANION GAP SERPL CALC-SCNC: 6 MMOL/L
AST SERPL-CCNC: 31 U/L
BILIRUB SERPL-MCNC: 0.8 MG/DL
BUN SERPL-MCNC: 17 MG/DL
CALCIUM SERPL-MCNC: 8.5 MG/DL
CHLORIDE SERPL-SCNC: 108 MMOL/L
CO2 SERPL-SCNC: 22 MMOL/L
CREAT SERPL-MCNC: 1 MG/DL
EST. GFR  (AFRICAN AMERICAN): >60 ML/MIN/1.73 M^2
EST. GFR  (NON AFRICAN AMERICAN): >60 ML/MIN/1.73 M^2
GLUCOSE SERPL-MCNC: 110 MG/DL
POTASSIUM SERPL-SCNC: 3.9 MMOL/L
PROT SERPL-MCNC: 5.8 G/DL
SODIUM SERPL-SCNC: 136 MMOL/L

## 2019-01-25 PROCEDURE — 25000003 PHARM REV CODE 250: Performed by: NURSE PRACTITIONER

## 2019-01-25 PROCEDURE — 25000003 PHARM REV CODE 250: Performed by: FAMILY MEDICINE

## 2019-01-25 PROCEDURE — 36415 COLL VENOUS BLD VENIPUNCTURE: CPT

## 2019-01-25 PROCEDURE — 80053 COMPREHEN METABOLIC PANEL: CPT

## 2019-01-25 RX ORDER — LISINOPRIL 10 MG/1
10 TABLET ORAL DAILY
Status: DISCONTINUED | OUTPATIENT
Start: 2019-01-25 | End: 2019-01-25 | Stop reason: HOSPADM

## 2019-01-25 RX ORDER — METOPROLOL TARTRATE 25 MG/1
12.5 TABLET, FILM COATED ORAL 2 TIMES DAILY
Qty: 30 TABLET | Refills: 11 | Status: SHIPPED | OUTPATIENT
Start: 2019-01-25 | End: 2019-05-14

## 2019-01-25 RX ADMIN — METOPROLOL TARTRATE 12.5 MG: 25 TABLET, FILM COATED ORAL at 08:01

## 2019-01-25 RX ADMIN — LISINOPRIL 10 MG: 10 TABLET ORAL at 11:01

## 2019-01-25 RX ADMIN — ASPIRIN 81 MG: 81 TABLET, COATED ORAL at 08:01

## 2019-01-25 RX ADMIN — APIXABAN 5 MG: 2.5 TABLET, FILM COATED ORAL at 08:01

## 2019-01-25 NOTE — PROGRESS NOTES
[facility] Hospital Medicine  Progress Note    Patient Name: Bi Jessica  MRN: [id number]  Patient Class: IP- Inpatient   Admission Date: 1/22/2019  Length of Stay: 1 days  Attending Physician: Hemalatha Ragland MD  Primary Care Provider: Thee Zarate MD        Subjective:     Principal Problem:Syncope    HPI:  Ms Jessica is a 78 year old male with PMHx of DM and HTN who presented to [facility] Emergency Room after being seen at [facility] today. Patient reports getting up to use the bathroom, lost consciousness and passed out. He was on the floor brief before going back to bed. Patient also laceration to left forehead. Associated symptoms include increase nauseas, weakness and fatigue for the pass month. Denies associated symptoms of chest pain, shortness of breath, fever, chills, nauseas, vomiting or diaphoresis. CT of the Head today showed no acute intracranial abnormality seen. EKG: showed A Fib. Patient denies any history of A Fib. He is being placed in observation, under the care of Hospital Medicine.     Hospital Course:  The pt is a 79 yo male with HTN, HLD, Dm who was placed in observation for syncope, severe fatigue, and new onset Afib. EKG showed Afib rate 89 with nonspecific T wave abnormality. Serial troponin normal. Cardiac echo showed normal LVEF, diastolic dysfunction. Carotid U/S showed No evidence of hemodynamically significant stenosis. BP low normal- no significant orthostasis.   Pt was placed on Lopressor and IV heparin. GANESH done which showed OSCAR thrombus. Cardiology recommended Start Eliquis 5mg BID, Cont Lopressor 25mg BID, repeat GANESH in 4 weeks,a nd  Follow up with Dr. Pandey in 1-2 weeks.     1/23  Pt had Temp 102.3F today. WBC normal. Influenza negative. CXR clear. UA and Blood cultures pending.   Pt complains of fatigue   1/24: fevers last night. Dec lopressor to 12.5mg since sbps in the 90s. Procal 0.26. Respiratory panel pending    Interval History: + severe fatigue    Review of Systems   Constitutional: Positive for fatigue and fever. Negative for appetite change, chills and diaphoresis.   HENT: Negative for congestion, nosebleeds, sore throat and trouble swallowing.    Eyes: Negative for pain, discharge and visual disturbance.   Respiratory: Negative for apnea, cough, chest tightness, shortness of breath, wheezing and stridor.    Cardiovascular: Negative for chest pain, palpitations and leg swelling.   Gastrointestinal: Negative for abdominal distention, abdominal pain, blood in stool, constipation, diarrhea, nausea and vomiting.   Endocrine: Negative for cold intolerance and heat intolerance.   Genitourinary: Negative for difficulty urinating, dysuria, flank pain, frequency and urgency.   Musculoskeletal: Negative for arthralgias, back pain, joint swelling, myalgias, neck pain and neck stiffness.   Skin: Negative for rash and wound.   Allergic/Immunologic: Negative for food allergies and immunocompromised state.   Neurological: Negative for dizziness, seizures, syncope, facial asymmetry, weakness, light-headedness and headaches.   Hematological: Negative for adenopathy.   Psychiatric/Behavioral: Negative for agitation, behavioral problems and confusion. The patient is not nervous/anxious.      Objective:     Vital Signs (Most Recent):  Temp: 99.7 °F (37.6 °C) (01/24/19 1219)  Pulse: 79 (01/24/19 1219)  Resp: 18 (01/24/19 1219)  BP: (!) 97/59 (01/24/19 1219)  SpO2: 99 % (01/24/19 1219) Vital Signs (24h Range):  Temp:  [98 °F (36.7 °C)-102.9 °F (39.4 °C)] 99.7 °F (37.6 °C)  Pulse:  [79-95] 79  Resp:  [18] 18  SpO2:  [94 %-99 %] 99 %  BP: ()/(55-67) 97/59     Weight: 100 kg (220 lb 7.4 oz)  Body mass index is 34.53 kg/m².    Intake/Output Summary (Last 24 hours) at 1/24/2019 1838  Last data filed at 1/24/2019 0600  Gross per 24 hour   Intake 1258.75 ml   Output 350 ml   Net 908.75 ml      Physical Exam   Constitutional: He is oriented to person, place, and time. He appears  well-developed and well-nourished.   HENT:   Head: Normocephalic and atraumatic.   Nose: Nose normal.   Mouth/Throat: Oropharynx is clear and moist.   Eyes: EOM are normal. Pupils are equal, round, and reactive to light. No scleral icterus.   Neck: Normal range of motion. Neck supple.   Cardiovascular: Normal rate, normal heart sounds and intact distal pulses. Exam reveals no gallop and no friction rub.   No murmur heard.  Irregular rhythm    Pulmonary/Chest: Effort normal and breath sounds normal. No respiratory distress. He has no wheezes.   Abdominal: Soft. Bowel sounds are normal. He exhibits no distension. There is no tenderness.   Musculoskeletal: Normal range of motion. He exhibits no edema.   Neurological: He is alert and oriented to person, place, and time. No cranial nerve deficit.   Skin: Skin is warm and dry. No rash noted.   Psychiatric: He has a normal mood and affect. His behavior is normal.   Nursing note and vitals reviewed.      Significant Labs:   BMP:   Recent Labs   Lab 01/24/19  0413   *   *   K 3.8      CO2 20*   BUN 19   CREATININE 1.1   CALCIUM 8.6*     CBC:   Recent Labs   Lab 01/23/19  0426 01/24/19  0413   WBC 11.57 9.98   HGB 14.4 13.4*   HCT 43.0 39.9*    130*     CMP:   Recent Labs   Lab 01/23/19  0426 01/24/19  0413    134*   K 4.3 3.8    106   CO2 23 20*   * 112*   BUN 21 19   CREATININE 1.1 1.1   CALCIUM 9.2 8.6*   PROT 6.6 5.9*   ALBUMIN 3.3* 2.8*   BILITOT 0.8 0.8   ALKPHOS 46* 40*   AST 20 28   ALT 15 16   ANIONGAP 8 8   EGFRNONAA >60 >60       Significant Imaging:  Imaging Results          US Carotid Bilateral (Final result)  Result time 01/22/19 18:01:22    Final result by Darek Godfrey MD (01/22/19 18:01:22)                 Impression:      No evidence of a hemodynamically significant carotid bifurcation stenosis.      Electronically signed by: Darek Godfrey MD  Date:    01/22/2019  Time:    18:01             Narrative:     EXAMINATION:  US CAROTID BILATERAL    CLINICAL HISTORY:  syncope;    TECHNIQUE:  Grayscale and color Doppler ultrasound examination of the carotid and vertebral artery systems bilaterally.  Stenosis estimates are per the NASCET measurement criteria.    COMPARISON:  None.    FINDINGS:  Right:    Internal Carotid Artery (ICA) peak systolic velocity 62.5 cm/sec    ICA/CCA peak systolic velocity ratio: 1    Plaque formation: No significant    Vertebral artery: Antegrade flow and normal waveform.    Left:    Internal Carotid Artery (ICA)  peak systolic velocity 58.2 cm/sec    ICA/CCA peak systolic velocity ratio: 0.9    Plaque formation: No significant    Vertebral artery: Antegrade flow and normal waveform.                              Assessment/Plan:      * Syncope    Likely secondary to Afib and low BP  Infectious work up in progress  OP f/u with Cardiology         Fever of unknown origin (FUO)    See above        Fever    CXR clear  Flu swab negative  UA and blood cultures negative  Respiratory panel pending     Thrombus of left atrial appendage    See above        New onset a-fib    GANESH showed OSCAR thrombus   DCCV not attempted. Cardiology recommends Start Eliquis 5mg BID, Cont Lopressor 25mg BID, repeat GANESH in 4 weeks, and  Follow up with Dr. jo in cardiology clinic in 1-2 weeks.            Diabetes mellitus type II, controlled    Accu check with SSI  Diabetic diet        Hypertension associated with diabetes    Continue B blocker   Monitor        Hyperlipidemia associated with type 2 diabetes mellitus             VTE Risk Mitigation (From admission, onward)        Ordered     apixaban tablet 5 mg  2 times daily      01/23/19 1219     IP VTE HIGH RISK PATIENT  Once      01/22/19 1348     Place sequential compression device  Until discontinued      01/22/19 1348              Hemalatha Ragland MD  Department of Hospital Medicine   [facility]

## 2019-01-25 NOTE — PLAN OF CARE
Problem: Adult Inpatient Plan of Care  Goal: Plan of Care Review  Outcome: Ongoing (interventions implemented as appropriate)  POC reviewed, verbalized understanding. Pt remained free from falls, fall precautions in place. Pt is AFIB  on monitor, . VSS. No other c/o at this time. PIV intact. Call bell and personal belongings within reach. Hourly rounding complete. Home this am  Reminded to call for assistance. Will continue to monitor.

## 2019-01-25 NOTE — SUBJECTIVE & OBJECTIVE
Interval History: + severe fatigue   Review of Systems   Constitutional: Positive for fatigue and fever. Negative for appetite change, chills and diaphoresis.   HENT: Negative for congestion, nosebleeds, sore throat and trouble swallowing.    Eyes: Negative for pain, discharge and visual disturbance.   Respiratory: Negative for apnea, cough, chest tightness, shortness of breath, wheezing and stridor.    Cardiovascular: Negative for chest pain, palpitations and leg swelling.   Gastrointestinal: Negative for abdominal distention, abdominal pain, blood in stool, constipation, diarrhea, nausea and vomiting.   Endocrine: Negative for cold intolerance and heat intolerance.   Genitourinary: Negative for difficulty urinating, dysuria, flank pain, frequency and urgency.   Musculoskeletal: Negative for arthralgias, back pain, joint swelling, myalgias, neck pain and neck stiffness.   Skin: Negative for rash and wound.   Allergic/Immunologic: Negative for food allergies and immunocompromised state.   Neurological: Negative for dizziness, seizures, syncope, facial asymmetry, weakness, light-headedness and headaches.   Hematological: Negative for adenopathy.   Psychiatric/Behavioral: Negative for agitation, behavioral problems and confusion. The patient is not nervous/anxious.      Objective:     Vital Signs (Most Recent):  Temp: 99.7 °F (37.6 °C) (01/24/19 1219)  Pulse: 79 (01/24/19 1219)  Resp: 18 (01/24/19 1219)  BP: (!) 97/59 (01/24/19 1219)  SpO2: 99 % (01/24/19 1219) Vital Signs (24h Range):  Temp:  [98 °F (36.7 °C)-102.9 °F (39.4 °C)] 99.7 °F (37.6 °C)  Pulse:  [79-95] 79  Resp:  [18] 18  SpO2:  [94 %-99 %] 99 %  BP: ()/(55-67) 97/59     Weight: 100 kg (220 lb 7.4 oz)  Body mass index is 34.53 kg/m².    Intake/Output Summary (Last 24 hours) at 1/24/2019 1838  Last data filed at 1/24/2019 0600  Gross per 24 hour   Intake 1258.75 ml   Output 350 ml   Net 908.75 ml      Physical Exam   Constitutional: He is oriented to  person, place, and time. He appears well-developed and well-nourished.   HENT:   Head: Normocephalic and atraumatic.   Nose: Nose normal.   Mouth/Throat: Oropharynx is clear and moist.   Eyes: EOM are normal. Pupils are equal, round, and reactive to light. No scleral icterus.   Neck: Normal range of motion. Neck supple.   Cardiovascular: Normal rate, normal heart sounds and intact distal pulses. Exam reveals no gallop and no friction rub.   No murmur heard.  Irregular rhythm    Pulmonary/Chest: Effort normal and breath sounds normal. No respiratory distress. He has no wheezes.   Abdominal: Soft. Bowel sounds are normal. He exhibits no distension. There is no tenderness.   Musculoskeletal: Normal range of motion. He exhibits no edema.   Neurological: He is alert and oriented to person, place, and time. No cranial nerve deficit.   Skin: Skin is warm and dry. No rash noted.   Psychiatric: He has a normal mood and affect. His behavior is normal.   Nursing note and vitals reviewed.      Significant Labs:   BMP:   Recent Labs   Lab 01/24/19  0413   *   *   K 3.8      CO2 20*   BUN 19   CREATININE 1.1   CALCIUM 8.6*     CBC:   Recent Labs   Lab 01/23/19  0426 01/24/19  0413   WBC 11.57 9.98   HGB 14.4 13.4*   HCT 43.0 39.9*    130*     CMP:   Recent Labs   Lab 01/23/19  0426 01/24/19  0413    134*   K 4.3 3.8    106   CO2 23 20*   * 112*   BUN 21 19   CREATININE 1.1 1.1   CALCIUM 9.2 8.6*   PROT 6.6 5.9*   ALBUMIN 3.3* 2.8*   BILITOT 0.8 0.8   ALKPHOS 46* 40*   AST 20 28   ALT 15 16   ANIONGAP 8 8   EGFRNONAA >60 >60       Significant Imaging:  Imaging Results          US Carotid Bilateral (Final result)  Result time 01/22/19 18:01:22    Final result by Darek Godfrey MD (01/22/19 18:01:22)                 Impression:      No evidence of a hemodynamically significant carotid bifurcation stenosis.      Electronically signed by: Darek Godfrey MD  Date:    01/22/2019  Time:    18:01              Narrative:    EXAMINATION:  US CAROTID BILATERAL    CLINICAL HISTORY:  syncope;    TECHNIQUE:  Grayscale and color Doppler ultrasound examination of the carotid and vertebral artery systems bilaterally.  Stenosis estimates are per the NASCET measurement criteria.    COMPARISON:  None.    FINDINGS:  Right:    Internal Carotid Artery (ICA) peak systolic velocity 62.5 cm/sec    ICA/CCA peak systolic velocity ratio: 1    Plaque formation: No significant    Vertebral artery: Antegrade flow and normal waveform.    Left:    Internal Carotid Artery (ICA)  peak systolic velocity 58.2 cm/sec    ICA/CCA peak systolic velocity ratio: 0.9    Plaque formation: No significant    Vertebral artery: Antegrade flow and normal waveform.

## 2019-01-25 NOTE — DISCHARGE SUMMARY
[facility] Hospital Medicine  Discharge Summary      Patient Name: Bi Jessica  MRN: [id number]  Admission Date: 1/22/2019  Hospital Length of Stay: 2 days  Discharge Date and Time:  01/25/2019 10:14 AM  Attending Physician: Hemalatha Ragland MD   Discharging Provider: Hemalatha Ragland MD  Primary Care Provider: Thee Zarate MD      HPI:   Ms Jessica is a 78 year old male with PMHx of DM and HTN who presented to [facility] Emergency Room after being seen at [facility] today. Patient reports getting up to use the bathroom, lost consciousness and passed out. He was on the floor brief before going back to bed. Patient also laceration to left forehead. Associated symptoms include increase nauseas, weakness and fatigue for the pass month. Denies associated symptoms of chest pain, shortness of breath, fever, chills, nauseas, vomiting or diaphoresis. CT of the Head today showed no acute intracranial abnormality seen. EKG: showed A Fib. Patient denies any history of A Fib. He is being placed in observation, under the care of Hospital Medicine.     Procedure(s) (LRB):  CARDIOVERSION (N/A)  ECHOCARDIOGRAM,TRANSESOPHAGEAL (N/A)      Hospital Course:   The pt is a 79 yo male with HTN, HLD, Dm who was placed in observation for syncope, severe fatigue, and new onset Afib. EKG showed Afib rate 89 with nonspecific T wave abnormality. Serial troponin normal. Cardiac echo showed normal LVEF, diastolic dysfunction. Carotid U/S showed No evidence of hemodynamically significant stenosis. BP low normal- no significant orthostasis.   Pt was placed on Lopressor and IV heparin. GANESH done which showed OSCAR thrombus. Cardiology recommended Start Eliquis 5mg BID, Cont Lopressor 25mg BID, repeat GANESH in 4 weeks,a nd  Follow up with Dr. Pandey in 1-2 weeks.     1/23  Pt had Temp 102.3F today. WBC normal. Influenza negative. CXR clear. UA and Blood cultures pending.   Pt complains of fatigue   1/24: fevers last night. Dec  lopressor to 12.5mg since sbps in the 90s. Procal 0.26. Respiratory panel pending  1/25: Afebrile with vitals stable. Seen and examined. Deemed stable for d/c.      Consults:   Consults (From admission, onward)        Status Ordering Provider     Inpatient consult to Cardiology  Once     Provider:  (Not yet assigned)    Completed ENDER QUACH          No new Assessment & Plan notes have been filed under this hospital service since the last note was generated.  Service: Hospital Medicine    Final Active Diagnoses:    Diagnosis Date Noted POA    PRINCIPAL PROBLEM:  Syncope [R55] 01/22/2019 Yes    Thrombus of left atrial appendage [I51.3] 01/23/2019 Yes    Fever [R50.9] 01/23/2019 Yes    Fever of unknown origin (FUO) [R50.9] 01/23/2019 Yes    New onset a-fib [I48.91] 01/22/2019 Yes    Diabetes mellitus type II, controlled [E11.9]  Yes    Hypertension associated with diabetes [E11.59, I10]  Yes      Problems Resolved During this Admission:       Discharged Condition: stable    Disposition: Home or Self Care    Follow Up:  Follow-up Information     Thee Zarate MD In 3 days.    Specialty:  Internal Medicine  Contact information:  [address]  [address]  [place] [address] [phone]             Charbel Pandey Md, MD In 2 weeks.    Specialties:  Cardiology, Internal Medicine  Contact information:  [address] [address] [phone]                 Patient Instructions:      Diet Cardiac     Diet diabetic     Activity as tolerated       Significant Diagnostic Studies: Labs:   CMP   Recent Labs   Lab 01/24/19 0413 01/25/19  0412   * 136   K 3.8 3.9    108   CO2 20* 22*   * 110   BUN 19 17   CREATININE 1.1 1.0   CALCIUM 8.6* 8.5*   PROT 5.9* 5.8*   ALBUMIN 2.8* 2.7*   BILITOT 0.8 0.8   ALKPHOS 40* 41*   AST 28 31   ALT 16 21   ANIONGAP 8 6*   ESTGFRAFRICA >60 >60   EGFRNONAA >60 >60   , CBC   Recent Labs   Lab 01/24/19  0413   WBC 9.98   HGB 13.4*   HCT 39.9*   PLT  130*   , A1C:   Recent Labs   Lab 11/05/18  0931   HGBA1C 5.7*    and All labs within the past 24 hours have been reviewed    Pending Diagnostic Studies:     None         Medications:  Reconciled Home Medications:      Medication List      START taking these medications    apixaban 5 mg Tab  Commonly known as:  ELIQUIS  Take 1 tablet (5 mg total) by mouth 2 (two) times daily.     metoprolol tartrate 25 MG tablet  Commonly known as:  LOPRESSOR  Take 0.5 tablets (12.5 mg total) by mouth 2 (two) times daily.        CONTINUE taking these medications    aspirin 81 MG EC tablet  Commonly known as:  ECOTRIN  Take 81 mg by mouth once daily.     calcium-vitamin D3 500 mg(1,250mg) -200 unit per tablet  Commonly known as:  OS-MONIK 500 + D3  Take 1 tablet by mouth every evening.     cyanocobalamin 1000 MCG tablet  Commonly known as:  VITAMIN B-12  Take 100 mcg by mouth once daily.     fish oil-omega-3 fatty acids 300-1,000 mg capsule  Take 2 g by mouth once daily.     FLAXSEED OIL ORAL  Take by mouth.     metFORMIN 850 MG tablet  Commonly known as:  GLUCOPHAGE  TAKE ONE TABLET BY MOUTH ONCE DAILY     ondansetron 4 MG Tbdl  Commonly known as:  ZOFRAN-ODT  Take 1 tablet (4 mg total) by mouth every 8 (eight) hours as needed (tid prn nausea).     sildenafil 20 mg Tab  Commonly known as:  REVATIO  Take 1-4 pills one hour prior to intercourse     simvastatin 40 MG tablet  Commonly known as:  ZOCOR  TAKE ONE TABLET BY MOUTH ONCE DAILY     vitamin E 1000 UNIT capsule  Take 1,000 Units by mouth once daily.        STOP taking these medications    lisinopril-hydrochlorothiazide 20-12.5 mg per tablet  Commonly known as:  PRINZIDE,ZESTORETIC            Indwelling Lines/Drains at time of discharge:   Lines/Drains/Airways     Airway                 Airway - Non-Surgical 01/23/19 1120 Nasal Cannula 1 day                Time spent on the discharge of patient: >30 minutes  Patient was seen and examined on the date of discharge and determined to  be suitable for discharge.         Hemalatha Ragland MD  Department of Hospital Medicine  [facility]

## 2019-01-25 NOTE — PLAN OF CARE
01/25/19 1605   Final Note   Assessment Type Final Discharge Note   Anticipated Discharge Disposition Home   Right Care Referral Info   Post Acute Recommendation No Care

## 2019-01-25 NOTE — PLAN OF CARE
Problem: Adult Inpatient Plan of Care  Goal: Plan of Care Review  Outcome: Ongoing (interventions implemented as appropriate)  Reviewed care plan with patient and son. Patient and son verbalized understanding. Pt remains free of fall and injury. Telemetry monitored as directed.

## 2019-01-25 NOTE — TELEPHONE ENCOUNTER
----- Message from Shyla Taveras NP sent at 1/25/2019  3:46 PM CST -----  Please call patient    HE needs hospital follow up with Dr. Pandey in 2 weeks with EKG    Thanks  Shyla

## 2019-01-28 ENCOUNTER — OFFICE VISIT (OUTPATIENT)
Dept: INTERNAL MEDICINE | Facility: CLINIC | Age: 79
End: 2019-01-28
Payer: MEDICARE

## 2019-01-28 VITALS
SYSTOLIC BLOOD PRESSURE: 108 MMHG | WEIGHT: 214.06 LBS | HEART RATE: 81 BPM | TEMPERATURE: 98 F | OXYGEN SATURATION: 97 % | DIASTOLIC BLOOD PRESSURE: 68 MMHG | BODY MASS INDEX: 33.53 KG/M2

## 2019-01-28 DIAGNOSIS — I15.2 HYPERTENSION ASSOCIATED WITH DIABETES: ICD-10-CM

## 2019-01-28 DIAGNOSIS — E11.59 HYPERTENSION ASSOCIATED WITH DIABETES: ICD-10-CM

## 2019-01-28 DIAGNOSIS — I48.91 NEW ONSET A-FIB: Primary | ICD-10-CM

## 2019-01-28 LAB
BACTERIA BLD CULT: NORMAL
BACTERIA BLD CULT: NORMAL
ENTEROVIRUS: NOT DETECTED
HUMAN BOCAVIRUS: NOT DETECTED
HUMAN CORONAVIRUS, COMMON COLD VIRUS: NOT DETECTED
INFLUENZA A - H1N1-09: NOT DETECTED
PARAINFLUENZA: NOT DETECTED
RVP - ADENOVIRUS: NOT DETECTED
RVP - HUMAN METAPNEUMOVIRUS (HMPV): NOT DETECTED
RVP - INFLUENZA A: NOT DETECTED
RVP - INFLUENZA B: NOT DETECTED
RVP - RESPIRATORY SYNCTIAL VIRUS (RSV) A: NOT DETECTED
RVP - RESPIRATORY VIRAL PANEL, SOURCE: NORMAL
RVP - RHINOVIRUS: NOT DETECTED

## 2019-01-28 PROCEDURE — 99999 PR PBB SHADOW E&M-EST. PATIENT-LVL III: CPT | Mod: PBBFAC,,, | Performed by: INTERNAL MEDICINE

## 2019-01-28 PROCEDURE — 99213 OFFICE O/P EST LOW 20 MIN: CPT | Mod: S$PBB,,, | Performed by: INTERNAL MEDICINE

## 2019-01-28 PROCEDURE — 99213 OFFICE O/P EST LOW 20 MIN: CPT | Mod: PBBFAC,PO | Performed by: INTERNAL MEDICINE

## 2019-01-28 PROCEDURE — 99999 PR PBB SHADOW E&M-EST. PATIENT-LVL III: ICD-10-PCS | Mod: PBBFAC,,, | Performed by: INTERNAL MEDICINE

## 2019-01-28 PROCEDURE — 99213 PR OFFICE/OUTPT VISIT, EST, LEVL III, 20-29 MIN: ICD-10-PCS | Mod: S$PBB,,, | Performed by: INTERNAL MEDICINE

## 2019-01-28 NOTE — PROGRESS NOTES
HPI:  Patient is a 78-year-old man who comes today for follow-up from the hospital stay.  Patient had a taking his 2 Tylenol p.m. some melatonin and during the night got up to go the bathroom and fell joe.  He went down and fell and hit his head.  There is no documented loss of consciousness.  The following morning he went to the emergency room.  In the ER he was found to be in AFib with a heart rate of 95.  He was admitted to the hospital.  Patient had she is transesophageal echo that showed he had a clot in his left atrial appendage.  Because of clot it was decided not to cardiovert him.  The plan is for him to be on anticoagulants for several weeks and then to temp cardioversion.  The rest of his echo was normal.  His other workup was also unremarkable.    Current meds have been verified and updated per the EMR  Exam:/68   Pulse 81   Temp 98.1 °F (36.7 °C) (Tympanic)   Wt 97.1 kg (214 lb 1.1 oz)   SpO2 97%   BMI 33.53 kg/m²   Exam deferred    Lab Results   Component Value Date    WBC 9.98 01/24/2019    HGB 13.4 (L) 01/24/2019    HCT 39.9 (L) 01/24/2019     (L) 01/24/2019    CHOL 138 01/23/2019    TRIG 56 01/23/2019    HDL 46 01/23/2019    ALT 21 01/25/2019    AST 31 01/25/2019     01/25/2019    K 3.9 01/25/2019     01/25/2019    CREATININE 1.0 01/25/2019    BUN 17 01/25/2019    CO2 22 (L) 01/25/2019    TSH 1.676 04/16/2018    PSA 2.9 10/10/2017    INR 1.0 01/22/2019    HGBA1C 5.7 (H) 11/05/2018       Impression:  Atrial fibrillation, controlled rate  Patient Active Problem List   Diagnosis    Anxiety    Depression    Hyperlipidemia associated with type 2 diabetes mellitus    Hypertension associated with diabetes    Diabetes mellitus type II, controlled    Left inguinal hernia    History of colon polyps    BPH with obstruction/lower urinary tract symptoms    Syncope    New onset a-fib    Thrombus of left atrial appendage       Plan:     Patient will see me at his regular  scheduled appointment.  He will be seen otherwise as needed.  We discussed atrial fibrillation in the need for anticoagulation.

## 2019-01-29 ENCOUNTER — TELEPHONE (OUTPATIENT)
Dept: CARDIOLOGY | Facility: CLINIC | Age: 79
End: 2019-01-29

## 2019-01-29 NOTE — TELEPHONE ENCOUNTER
Returned call to patient's daughter Huong--states needs to know if patient should wait until after appointment with Dr. Pandey on 2/7/19 before being released to go back to work--please advise

## 2019-01-29 NOTE — TELEPHONE ENCOUNTER
Called back to patient's daughter Huong and informed that patient needs to avoid very strenuous activity until follow up with Dr. Pandey--Huong verbalizes understanding

## 2019-01-29 NOTE — TELEPHONE ENCOUNTER
----- Message from Enedina Horn sent at 1/29/2019  1:18 PM CST -----  Contact: ms moore-daughter  states that pt would like to return to work (does a lot of physical work), does he need to wait to appt...[phone]

## 2019-02-07 ENCOUNTER — OFFICE VISIT (OUTPATIENT)
Dept: CARDIOLOGY | Facility: CLINIC | Age: 79
End: 2019-02-07
Payer: MEDICARE

## 2019-02-07 VITALS
HEART RATE: 60 BPM | HEIGHT: 67 IN | SYSTOLIC BLOOD PRESSURE: 114 MMHG | WEIGHT: 203.63 LBS | BODY MASS INDEX: 31.96 KG/M2 | DIASTOLIC BLOOD PRESSURE: 60 MMHG

## 2019-02-07 DIAGNOSIS — E11.9 CONTROLLED TYPE 2 DIABETES MELLITUS WITHOUT COMPLICATION, WITHOUT LONG-TERM CURRENT USE OF INSULIN: ICD-10-CM

## 2019-02-07 DIAGNOSIS — I48.0 PAF (PAROXYSMAL ATRIAL FIBRILLATION): Primary | ICD-10-CM

## 2019-02-07 DIAGNOSIS — I51.3 THROMBUS OF LEFT ATRIAL APPENDAGE: ICD-10-CM

## 2019-02-07 DIAGNOSIS — E78.5 HYPERLIPIDEMIA ASSOCIATED WITH TYPE 2 DIABETES MELLITUS: ICD-10-CM

## 2019-02-07 DIAGNOSIS — E11.59 HYPERTENSION ASSOCIATED WITH DIABETES: ICD-10-CM

## 2019-02-07 DIAGNOSIS — I15.2 HYPERTENSION ASSOCIATED WITH DIABETES: ICD-10-CM

## 2019-02-07 DIAGNOSIS — E11.69 HYPERLIPIDEMIA ASSOCIATED WITH TYPE 2 DIABETES MELLITUS: ICD-10-CM

## 2019-02-07 PROCEDURE — 99213 OFFICE O/P EST LOW 20 MIN: CPT | Mod: PBBFAC | Performed by: INTERNAL MEDICINE

## 2019-02-07 PROCEDURE — 99999 PR PBB SHADOW E&M-EST. PATIENT-LVL III: CPT | Mod: PBBFAC,,, | Performed by: INTERNAL MEDICINE

## 2019-02-07 PROCEDURE — 99214 PR OFFICE/OUTPT VISIT, EST, LEVL IV, 30-39 MIN: ICD-10-PCS | Mod: S$PBB,,, | Performed by: INTERNAL MEDICINE

## 2019-02-07 PROCEDURE — 99999 PR PBB SHADOW E&M-EST. PATIENT-LVL III: ICD-10-PCS | Mod: PBBFAC,,, | Performed by: INTERNAL MEDICINE

## 2019-02-07 PROCEDURE — 93000 EKG 12-LEAD: ICD-10-PCS | Mod: S$GLB,,, | Performed by: INTERNAL MEDICINE

## 2019-02-07 PROCEDURE — 93000 ELECTROCARDIOGRAM COMPLETE: CPT | Mod: S$GLB,,, | Performed by: INTERNAL MEDICINE

## 2019-02-07 PROCEDURE — 99214 OFFICE O/P EST MOD 30 MIN: CPT | Mod: S$PBB,,, | Performed by: INTERNAL MEDICINE

## 2019-02-07 RX ORDER — ZINC GLUCONATE 50 MG
50 TABLET ORAL DAILY
COMMUNITY

## 2019-02-07 RX ORDER — MAGNESIUM 30 MG
TABLET ORAL DAILY
COMMUNITY

## 2019-02-07 NOTE — PATIENT INSTRUCTIONS
Understanding Atrial Fibrillation    An arrhythmia is any problem with the speed or pattern of the heartbeat. Atrial fibrillation (AFib) is a common type of arrhythmia. It causes fast, chaotic electrical signals in the atria. This leads to poor functioning of the heart. It also affects how much blood your heart can pump out to the body.  Afib may occur once in a while and go away on its own. Or it may continue for longer periods and need treatment.  AFib can lead to serious problems, such as stroke. Your healthcare provider will need to monitor and manage it.  What happens during atrial fibrillation?   The heart has an electrical system that sends signals to control the heartbeat. As signals move through the heart, they tell the hearts upper chambers (atria) and lower chambers (ventricles) when to squeeze (contract) and relax. This lets blood move through the heart and out to the body and lungs.  With AFib, the atria receive abnormal signals. This causes them to contract in a fast and irregular way, and out of sync with the ventricles. When this happens, the atria also have a harder time moving blood into the ventricles. Blood may then pool in the atria, which increases the risk for blood clots and stroke. The ventricles also may contract too quickly and irregularly. As a result, they may not pump blood to the body and lungs as well as they should. This can weaken the heart muscle over time and cause heart failure.  What causes atrial fibrillation?  AFib is more common in older adults. It has many possible causes including:  · Coronary artery disease  · Heart valve disease  · Heart attack  · Heart surgery  · High blood pressure  · Thyroid disease  · Diabetes  · Lung disease  · Sleep apnea  · Heavy alcohol use  In some cases of AFib, doctors do not know the cause.  What are the symptoms of atrial fibrillation?  AFib may or may not cause symptoms. If symptoms do occur, they may include:  · A fast, pounding,  irregular heartbeat  · Shortness of breath  · Tiredness  · Dizziness or fainting  · Chest pain  How is atrial fibrillation treated?  Treatments for AFib can include any of the options below.  · Medicines. You may be prescribed:  ¨ Heart rate medicines to help slow down the heartbeat  ¨ Heart rhythm medicines to help the heart beat more regularly  ¨ Anti-clotting medicines to help reduce the risk for blood clots and stroke  · Electrical cardioversion. Your healthcare provider uses special pads or paddles to send one or more brief electrical shocks to the heart. This can help reset the heartbeat to normal.  · Ablation. Long, thin tubes called catheters are threaded through a blood vessel to the heart. There, the catheters send out hot or cold energy to the areas causing the abnormal signals. This energy destroys the problem tissue or cells. This improves the chances that your heart will stay in normal rhythm without using medicines. If your heart rate and rhythm cant be controlled, you may need ablation and a pacemaker. These will help control the heart rate and regularity of the heartbeat.  · Surgery. During surgery, your healthcare provider may use different methods to create scar tissue in the areas of the heart causing the abnormal signals. The scar tissue disrupts the abnormal signals and may stop AFib from occurring.  What are the complications of atrial fibrillation?  These can include:  · Blood clots  · Stroke  · Heart failure. This problem occurs when the heart muscle weakens so much that it can no longer pump blood well.  When should I call my healthcare provider?  Call your healthcare provider right away if you have any of these:  · Symptoms that dont get better with treatment, or get worse  · New symptoms   Date Last Reviewed: 5/1/2016  © 1935-9342 [employer]. [address] [place] [address], [place] [address]. All rights reserved. This information is not intended as a substitute for professional  medical care. Always follow your healthcare professional's instructions.

## 2019-02-07 NOTE — PROGRESS NOTES
Subjective:   Patient ID:  Bi Jessica is a 78 y.o. male who presents for cardiac consult of Atrial Fibrillation and Hospital Follow Up      HPI  The patient came in today for cardiac consult of Atrial Fibrillation and Hospital Follow Up    2/7/19  Bi Jessica is a 78 y.o. male  with newly diagnosed Afib, HTN, HLP, DM who presented to [facility] ED due to recent syncopal episode overnight on 1/22/19. Patient reports getting up last PM/early AM and feeling weak and having syncopal episode afterward. Initial EKG revealed new onset AFIB, rate 89. Serial troponin normal. Cardiac echo showed normal LVEF, diastolic dysfunction. Carotid U/S showed No evidence of hemodynamically significant stenosis. BP low normal- no significant orthostasis.     Pt was placed on Lopressor and IV heparin. GANESH done which showed OSCAR thrombus. He was started on Eliquis 5mg BID, Lopressor 25mg BID, told will repeat GANESH in 4 weeks.    Today he is in NSR. Discussed does not need GANESH/DCCV. He can't sleep well, wants to take melatonin and occ Tylenol PM. Wants to go back to work at [employer], discussed it is fine as long as he doesn't over exert himself.     Patient feels no chest pain, no sob, no leg swelling, no PND, no palpitation, no dizziness, no syncope, no CNS symptoms.    Patient has fairly good exercise tolerance.    Patient is compliant with medications.    ECG - sinus purvi V rate 54     2D ECHO  CONCLUSIONS     1 - Normal left ventricular systolic function (EF 55-60%).     2 - Indeterminate LV diastolic function.     3 - Normal right ventricular systolic function .     4 - No wall motion abnormalities.     5 - Concentric remodeling.     6 - The estimated PA systolic pressure is 39 mmHg.     7 - Mild tricuspid regurgitation.       This document has been electronically    SIGNED BY: Charbel Pandey MD On: 01/22/2019 17:02    Past Medical History:   Diagnosis Date    Anxiety     Atrial fibrillation     BPH with obstruction/lower urinary  tract symptoms     Depression     Diabetes mellitus type II, controlled     Hyperlipidemia associated with type 2 diabetes mellitus     Hypertension associated with diabetes        Past Surgical History:   Procedure Laterality Date    CARDIOVERSION N/A 1/23/2019    Performed by [facility]bel Pandey MD at [facility] C[facility] LAB    COLONOSCOPY N/A 5/1/2017    Performed by Vishal Andrade MD at [facility] ENDO    ECHOCARDIOGRAM,TRANSESOPHAGEAL N/A 1/23/2019    Performed by Charbel Pandey MD at [facility] CATH LAB    TONSILLECTOMY         Social History     Tobacco Use    Smoking status: Former Smoker   Substance Use Topics    Alcohol use: Yes     Alcohol/week: 0.6 oz     Types: 1 Glasses of wine per week     Comment: occasionally     Drug use: No       Family History   Problem Relation Age of Onset    Heart disease Mother     Heart disease Father     Heart disease Brother     Cancer Brother        Patient's Medications   New Prescriptions    No medications on file   Previous Medications    APIXABAN (ELIQUIS) 5 MG TAB    Take 1 tablet (5 mg total) by mouth 2 (two) times daily.    ASPIRIN (ECOTRIN) 81 MG EC TABLET    Take 81 mg by mouth once daily.    CALCIUM-VITAMIN D3 500 MG(1,250MG) -200 UNIT PER TABLET    Take 1 tablet by mouth every evening.    CYANOCOBALAMIN (VITAMIN B-12) 1000 MCG TABLET    Take 100 mcg by mouth once daily.    FISH OIL-OMEGA-3 FATTY ACIDS 300-1,000 MG CAPSULE    Take 2 g by mouth once daily.    FLAXSEED OIL ORAL    Take by mouth.    MAGNESIUM 30 MG TAB    Take by mouth once.    METFORMIN (GLUCOPHAGE) 850 MG TABLET    TAKE ONE TABLET BY MOUTH ONCE DAILY    METOPROLOL TARTRATE (LOPRESSOR) 25 MG TABLET    Take 0.5 tablets (12.5 mg total) by mouth 2 (two) times daily.    ONDANSETRON (ZOFRAN-ODT) 4 MG TBDL    Take 1 tablet (4 mg total) by mouth every 8 (eight) hours as needed (tid prn nausea).    POTASSIUM 99 MG TAB    Take by mouth once.    SILDENAFIL (REVATIO) 20 MG TAB    Take 1-4 pills one hour prior to intercourse  "   SIMVASTATIN (ZOCOR) 40 MG TABLET    TAKE ONE TABLET BY MOUTH ONCE DAILY    VITAMIN E 1000 UNIT CAPSULE    Take 1,000 Units by mouth once daily.    ZINC GLUCONATE 50 MG TABLET    Take 50 mg by mouth once daily.   Modified Medications    No medications on file   Discontinued Medications    No medications on file       Review of Systems   Constitutional: Negative.    HENT: Negative.    Eyes: Negative.    Respiratory: Negative.    Cardiovascular: Negative.    Gastrointestinal: Negative.    Genitourinary: Negative.    Musculoskeletal: Negative.    Skin: Negative.    Neurological: Negative.    Endo/Heme/Allergies: Negative.    Psychiatric/Behavioral: Negative.    All 12 systems otherwise negative.      Wt Readings from Last 3 Encounters:   02/07/19 92.4 kg (203 lb 9.5 oz)   01/28/19 97.1 kg (214 lb 1.1 oz)   01/25/19 100.1 kg (220 lb 10.9 oz)     Temp Readings from Last 3 Encounters:   01/28/19 98.1 °F (36.7 °C) (Tympanic)   01/25/19 98.7 °F (37.1 °C) (Oral)   01/22/19 97.9 °F (36.6 °C) (Oral)     BP Readings from Last 3 Encounters:   02/07/19 114/60   01/28/19 108/68   01/25/19 139/78     Pulse Readings from Last 3 Encounters:   02/07/19 60   01/28/19 81   01/25/19 85       /60 (BP Method: Large (Manual))   Pulse 60   Ht 5' 7" (1.702 m)   Wt 92.4 kg (203 lb 9.5 oz)   BMI 31.89 kg/m²     Objective:   Physical Exam   Constitutional: He is oriented to person, place, and time. He appears well-developed and well-nourished. No distress.   HENT:   Head: Normocephalic and atraumatic.   Nose: Nose normal.   Mouth/Throat: Oropharynx is clear and moist.   Eyes: Conjunctivae and EOM are normal. No scleral icterus.   Neck: Normal range of motion. Neck supple. No JVD present. No thyromegaly present.   Cardiovascular: Regular rhythm, S1 normal and S2 normal. Bradycardia present. Exam reveals no gallop, no S3, no S4 and no friction rub.   No murmur heard.  Pulmonary/Chest: Effort normal and breath sounds normal. No stridor. " No respiratory distress. He has no wheezes. He has no rales. He exhibits no tenderness.   Abdominal: Soft. Bowel sounds are normal. He exhibits no distension and no mass. There is no tenderness. There is no rebound.   Genitourinary:   Genitourinary Comments: Deferred   Musculoskeletal: Normal range of motion. He exhibits no edema, tenderness or deformity.   Lymphadenopathy:     He has no cervical adenopathy.   Neurological: He is alert and oriented to person, place, and time. He exhibits normal muscle tone. Coordination normal.   Skin: Skin is warm and dry. No rash noted. He is not diaphoretic. No erythema. No pallor.   Psychiatric: He has a normal mood and affect. His behavior is normal. Judgment and thought content normal.   Nursing note and vitals reviewed.      Lab Results   Component Value Date     01/25/2019    K 3.9 01/25/2019     01/25/2019    CO2 22 (L) 01/25/2019    BUN 17 01/25/2019    CREATININE 1.0 01/25/2019     01/25/2019    HGBA1C 5.7 (H) 11/05/2018    AST 31 01/25/2019    ALT 21 01/25/2019    ALBUMIN 2.7 (L) 01/25/2019    PROT 5.8 (L) 01/25/2019    BILITOT 0.8 01/25/2019    WBC 9.98 01/24/2019    HGB 13.4 (L) 01/24/2019    HCT 39.9 (L) 01/24/2019    MCV 92 01/24/2019     (L) 01/24/2019    INR 1.0 01/22/2019    TSH 1.676 04/16/2018    CHOL 138 01/23/2019    HDL 46 01/23/2019    LDLCALC 80.8 01/23/2019    TRIG 56 01/23/2019     Assessment:      1. PAF (paroxysmal atrial fibrillation)    2. Hypertension associated with diabetes    3. Hyperlipidemia associated with type 2 diabetes mellitus    4. Thrombus of left atrial appendage    5. Controlled type 2 diabetes mellitus without complication, without long-term current use of insulin        Plan:   1. PAF with recent OSCAR thrombus   - in NSR today   - cont Eliquis and BB  - Holter ordered  - does not need GANESH/DCCV    2. HTN  - cont meds    3. HLD  - cont statin    4. DM2  - cont meds per PCP    Thank you for allowing me to  participate in this patient's care. Please do not hesitate to contact me with any questions or concerns. Consult note has been forwarded to the referral physician.

## 2019-03-11 ENCOUNTER — TELEPHONE (OUTPATIENT)
Dept: CARDIOLOGY | Facility: CLINIC | Age: 79
End: 2019-03-11

## 2019-03-11 NOTE — TELEPHONE ENCOUNTER
Returned call. All questions related to holter answered.  Number of hours required to wear, return date of holter.

## 2019-03-11 NOTE — TELEPHONE ENCOUNTER
----- Message from Huong Espinal sent at 3/11/2019 12:04 PM CDT -----  Type:  Needs Medical Advice    Who Called:  Pt Bi  Symptoms (please be specific):  How long has patient had these symptoms:    Pharmacy name and phone #:   Would the patient rather a call back or a response via [facility]?  Call back  Best Call Back Number:  [phone]  Or [phone]  Additional Information:  States he has an appt tomorrow//3-12-19 for a 48 hr Holter Monitor//he has question//please call//brenda/jen

## 2019-03-12 ENCOUNTER — CLINICAL SUPPORT (OUTPATIENT)
Dept: CARDIOLOGY | Facility: CLINIC | Age: 79
End: 2019-03-12
Attending: INTERNAL MEDICINE
Payer: MEDICARE

## 2019-03-12 DIAGNOSIS — I48.0 PAF (PAROXYSMAL ATRIAL FIBRILLATION): ICD-10-CM

## 2019-03-12 PROCEDURE — 93227 HOLTER MONITOR - 48 HOUR: ICD-10-PCS | Mod: S$PBB,,, | Performed by: INTERNAL MEDICINE

## 2019-03-12 PROCEDURE — 93226 XTRNL ECG REC<48 HR SCAN A/R: CPT | Mod: PBBFAC | Performed by: INTERNAL MEDICINE

## 2019-03-12 PROCEDURE — 93227 XTRNL ECG REC<48 HR R&I: CPT | Mod: S$PBB,,, | Performed by: INTERNAL MEDICINE

## 2019-03-22 ENCOUNTER — TELEPHONE (OUTPATIENT)
Dept: INTERNAL MEDICINE | Facility: CLINIC | Age: 79
End: 2019-03-22

## 2019-03-22 DIAGNOSIS — E11.9 CONTROLLED TYPE 2 DIABETES MELLITUS WITHOUT COMPLICATION, WITHOUT LONG-TERM CURRENT USE OF INSULIN: Primary | ICD-10-CM

## 2019-03-22 NOTE — TELEPHONE ENCOUNTER
----- Message from Murali Patel sent at 3/22/2019  4:25 PM CDT -----  Contact: pt   Pt needs to pramod urine lab to 5/20 and order expires on 5/19       ..[phone] (home)

## 2019-03-25 ENCOUNTER — TELEPHONE (OUTPATIENT)
Dept: CARDIOLOGY | Facility: CLINIC | Age: 79
End: 2019-03-25

## 2019-03-25 NOTE — TELEPHONE ENCOUNTER
----- Message from Grecia Patterson sent at 3/25/2019 11:15 AM CDT -----  Contact: Pt   Type:  Patient Returning Call    Who Called: pt   Who Left Message for Patient:nurse/ Dr jo   Does the patient know what this is regarding?:his monitor / blood pressure in heart / appt   Would the patient rather a call back or a response via [facility]? Phone   Best Call Back Number:[phone] or  [phone] leave VM if no answer  Additional Information: discuss his dates and times that he's avail / his job schedule is only scheduled 2 weeks at a time and will discuss with his Supervisor

## 2019-03-25 NOTE — TELEPHONE ENCOUNTER
Returned call and patient stated may not be able to come on 05/14 as scheduled due to work and will call if need to reschedule.      Also discussed holter results.    Patient verbalized understanding.

## 2019-03-25 NOTE — TELEPHONE ENCOUNTER
----- Message from Radha Yan sent at 3/25/2019 10:51 AM CDT -----  Contact: Patient  Type:  Patient Returning Call    Who Called Bi  Who Left Message for Patient: the nurse  Does the patient know what this is regarding?: his appointment  Would the patient rather a call back or a response via [facility]?  Call back   Best Call Back Number: [phone] cell.  Additional Information: n/a    Radha Marley

## 2019-03-26 ENCOUNTER — PES CALL (OUTPATIENT)
Dept: ADMINISTRATIVE | Facility: CLINIC | Age: 79
End: 2019-03-26

## 2019-05-13 ENCOUNTER — TELEPHONE (OUTPATIENT)
Dept: CARDIOLOGY | Facility: CLINIC | Age: 79
End: 2019-05-13

## 2019-05-13 NOTE — TELEPHONE ENCOUNTER
S/W Pt and pt verbalized that he wanted to keep the same day(5/14/19) but come in at earlier time for appt. Pt was offered a 1:00 pm on 5/14/19, pt took appt. Pt verbalized appt.//rf         ----- Message from Virginia Ye sent at 5/13/2019 10:38 AM CDT -----  Contact: pt   Type:  Sooner Apoointment Request    Caller is requesting a sooner appointment.  Caller declined first available appointment listed below.  Caller will not accept being placed on the waitlist and is requesting a message be sent to doctor.  Name of Caller: JEROME HINKLE   When is the first available appointment?   Symptoms:  Would the patient rather a call back or a response via [facility]? Call   Best Call Back Number: [phone]    Additional Information: pt is requesting a call back from the nurse in regards to seeing if he can come in to see the Dr earlier on 05/14/2019 because his daughter is going to be coming in from [place] to bring him to the visit

## 2019-05-14 ENCOUNTER — OFFICE VISIT (OUTPATIENT)
Dept: CARDIOLOGY | Facility: CLINIC | Age: 79
End: 2019-05-14
Payer: MEDICARE

## 2019-05-14 VITALS
DIASTOLIC BLOOD PRESSURE: 85 MMHG | BODY MASS INDEX: 32.73 KG/M2 | HEIGHT: 67 IN | WEIGHT: 208.56 LBS | SYSTOLIC BLOOD PRESSURE: 134 MMHG | HEART RATE: 50 BPM

## 2019-05-14 DIAGNOSIS — E11.69 HYPERLIPIDEMIA ASSOCIATED WITH TYPE 2 DIABETES MELLITUS: ICD-10-CM

## 2019-05-14 DIAGNOSIS — I48.0 PAF (PAROXYSMAL ATRIAL FIBRILLATION): Primary | ICD-10-CM

## 2019-05-14 DIAGNOSIS — I51.3 THROMBUS OF LEFT ATRIAL APPENDAGE: ICD-10-CM

## 2019-05-14 DIAGNOSIS — I15.2 HYPERTENSION ASSOCIATED WITH DIABETES: ICD-10-CM

## 2019-05-14 DIAGNOSIS — E11.9 CONTROLLED TYPE 2 DIABETES MELLITUS WITHOUT COMPLICATION, WITHOUT LONG-TERM CURRENT USE OF INSULIN: ICD-10-CM

## 2019-05-14 DIAGNOSIS — E78.5 HYPERLIPIDEMIA ASSOCIATED WITH TYPE 2 DIABETES MELLITUS: ICD-10-CM

## 2019-05-14 DIAGNOSIS — E11.59 HYPERTENSION ASSOCIATED WITH DIABETES: ICD-10-CM

## 2019-05-14 DIAGNOSIS — F03.90 DEMENTIA WITHOUT BEHAVIORAL DISTURBANCE, UNSPECIFIED DEMENTIA TYPE: ICD-10-CM

## 2019-05-14 PROBLEM — I48.91 NEW ONSET A-FIB: Status: RESOLVED | Noted: 2019-01-22 | Resolved: 2019-05-14

## 2019-05-14 PROCEDURE — 99213 OFFICE O/P EST LOW 20 MIN: CPT | Mod: PBBFAC | Performed by: INTERNAL MEDICINE

## 2019-05-14 PROCEDURE — 99214 OFFICE O/P EST MOD 30 MIN: CPT | Mod: S$PBB,,, | Performed by: INTERNAL MEDICINE

## 2019-05-14 PROCEDURE — 99999 PR PBB SHADOW E&M-EST. PATIENT-LVL III: ICD-10-PCS | Mod: PBBFAC,,, | Performed by: INTERNAL MEDICINE

## 2019-05-14 PROCEDURE — 99999 PR PBB SHADOW E&M-EST. PATIENT-LVL III: CPT | Mod: PBBFAC,,, | Performed by: INTERNAL MEDICINE

## 2019-05-14 PROCEDURE — 99214 PR OFFICE/OUTPT VISIT, EST, LEVL IV, 30-39 MIN: ICD-10-PCS | Mod: S$PBB,,, | Performed by: INTERNAL MEDICINE

## 2019-05-14 NOTE — PROGRESS NOTES
Subjective:   Patient ID:  Bi Jessica is a 79 y.o. male who presents for cardiac consult of Follow-up      HPI  The patient came in today for cardiac consult of Follow-up      Bi Jessica is a 79 y.o. male  with PAF on Eliquis, HTN, HLP, DM here for follow up CV eval.     2/7/19  He presented to to [facility] ED due to recent syncopal episode overnight on 1/22/19. Patient reports getting up last PM/early AM and feeling weak and having syncopal episode afterward. Initial EKG revealed new onset AFIB, rate 89. Serial troponin normal. Cardiac echo showed normal LVEF, diastolic dysfunction. Carotid U/S showed No evidence of hemodynamically significant stenosis. BP low normal- no significant orthostasis. Pt was placed on Lopressor and IV heparin. GANESH done which showed OSCAR thrombus. He was started on Eliquis 5mg BID, Lopressor 25mg BID, told will repeat GANESH in 4 weeks.Today he is in NSR. Discussed does not need GANESH/DCCV. He can't sleep well, wants to take melatonin and occ Tylenol PM. Wants to go back to work at [employer], discussed it is fine as long as he doesn't over exert himself.     5/14/19  Holter revealed bradycardia AVG HR 49, no AFib. He has some dental pain, maybe infection or abscess. No fevers/chills. He feels drowsy/sluggish, discussed will stop BB. Pt seems more forgetful, unsure dementia due to TIA/vascular dementia vs early Alzeihmers, daughter agrees for neuro eval as well.     Patient feels no chest pain, no sob, no leg swelling, no PND, no palpitation, no dizziness, no syncope, no CNS symptoms.    Patient has fairly good exercise tolerance.    Patient is compliant with medications.    ECG - sinus purvi V rate 54     2D ECHO  01/22/2019  CONCLUSIONS     1 - Normal left ventricular systolic function (EF 55-60%).     2 - Indeterminate LV diastolic function.     3 - Normal right ventricular systolic function .     4 - No wall motion abnormalities.     5 - Concentric remodeling.     6 - The estimated PA  systolic pressure is 39 mmHg.     7 - Mild tricuspid regurgitation.       HOLTER 3/12/19  TEST DESCRIPTION   PREDOMINANT RHYTHM  1. Sinus rhythm with heart rates varying between 35 and 103 bpm with an average of 49 bpm.     VENTRICULAR ARRHYTHMIAS  1. There were rare PVCs totalling 160 and averaging 3 per hour.   The ventricular arrhythmias percentage was .1.    2. There were no episodes of ventricular tachycardia.    SUPRA VENTRICULAR ARRHYTHMIAS  1. There was no supraventricular ectopic activity.  SINUS NODE FUNCTION  1. There was no evidence of high grade SA chacorta block.   AV CONDUCTION  1. There was no evidence of high grade AV block.     Past Medical History:   Diagnosis Date    Anxiety     Atrial fibrillation     BPH with obstruction/lower urinary tract symptoms     Depression     Diabetes mellitus type II, controlled     Hyperlipidemia associated with type 2 diabetes mellitus     Hypertension associated with diabetes        Past Surgical History:   Procedure Laterality Date    CARDIOVERSION N/A 1/23/2019    Performed by [facility]bel [facility]ey [facility] at [facility] C[facility]    COLONOSCOPY N/A 5/1/2017    Performed by Vishal Andrade MD at [facility] ENDO    ECHOCARDIOGRAM,TRANSESOPHAGEAL N/A 1/23/2019    Performed by [facility]bel [facility]ey [facility] at [facility] CATH LAB    TONSILLECTOMY         Social History     Tobacco Use    Smoking status: Former Smoker   Substance Use Topics    Alcohol use: Yes     Alcohol/week: 0.6 oz     Types: 1 Glasses of wine per week     Comment: occasionally     Drug use: No       Family History   Problem Relation Age of Onset    Heart disease Mother     Heart disease Father     Heart disease Brother     Cancer Brother        Patient's Medications   New Prescriptions    No medications on file   Previous Medications    APIXABAN (ELIQUIS) 5 MG TAB    Take 1 tablet (5 mg total) by mouth 2 (two) times daily.    ASPIRIN (ECOTRIN) 81 MG EC TABLET    Take 81 mg by mouth once daily.    CALCIUM-VITAMIN D3 500 MG(1,250MG)  -200 UNIT PER TABLET    Take 1 tablet by mouth every evening.    CYANOCOBALAMIN (VITAMIN B-12) 1000 MCG TABLET    Take 100 mcg by mouth once daily.    FISH OIL-OMEGA-3 FATTY ACIDS 300-1,000 MG CAPSULE    Take 2 g by mouth once daily.    FLAXSEED OIL ORAL    Take by mouth.    MAGNESIUM 30 MG TAB    Take by mouth once.    METFORMIN (GLUCOPHAGE) 850 MG TABLET    TAKE ONE TABLET BY MOUTH ONCE DAILY    ONDANSETRON (ZOFRAN-ODT) 4 MG TBDL    Take 1 tablet (4 mg total) by mouth every 8 (eight) hours as needed (tid prn nausea).    POTASSIUM 99 MG TAB    Take by mouth once.    SILDENAFIL (REVATIO) 20 MG TAB    Take 1-4 pills one hour prior to intercourse    SIMVASTATIN (ZOCOR) 40 MG TABLET    TAKE ONE TABLET BY MOUTH ONCE DAILY    VITAMIN E 1000 UNIT CAPSULE    Take 1,000 Units by mouth once daily.    ZINC GLUCONATE 50 MG TABLET    Take 50 mg by mouth once daily.   Modified Medications    No medications on file   Discontinued Medications    METOPROLOL TARTRATE (LOPRESSOR) 25 MG TABLET    Take 0.5 tablets (12.5 mg total) by mouth 2 (two) times daily.       Review of Systems   Constitutional: Positive for malaise/fatigue.   HENT: Negative.    Eyes: Negative.    Respiratory: Negative.    Cardiovascular: Negative.    Gastrointestinal: Negative.    Genitourinary: Negative.    Musculoskeletal: Negative.    Skin: Negative.    Neurological: Negative.    Endo/Heme/Allergies: Negative.    Psychiatric/Behavioral: Negative.    All 12 systems otherwise negative.      Wt Readings from Last 3 Encounters:   05/14/19 94.6 kg (208 lb 8.9 oz)   02/07/19 92.4 kg (203 lb 9.5 oz)   01/28/19 97.1 kg (214 lb 1.1 oz)     Temp Readings from Last 3 Encounters:   01/28/19 98.1 °F (36.7 °C) (Tympanic)   01/25/19 98.7 °F (37.1 °C) (Oral)   01/22/19 97.9 °F (36.6 °C) (Oral)     BP Readings from Last 3 Encounters:   05/14/19 134/85   02/07/19 114/60   01/28/19 108/68     Pulse Readings from Last 3 Encounters:   05/14/19 (!) 50   02/07/19 60   01/28/19 81  "      /85 (BP Location: Left arm, Patient Position: Sitting)   Pulse (!) 50   Ht 5' 7" (1.702 m)   Wt 94.6 kg (208 lb 8.9 oz)   BMI 32.66 kg/m²     Objective:   Physical Exam   Constitutional: He is oriented to person, place, and time. He appears well-developed and well-nourished. No distress.   HENT:   Head: Normocephalic and atraumatic.   Nose: Nose normal.   Mouth/Throat: Oropharynx is clear and moist.   Eyes: Conjunctivae and EOM are normal. No scleral icterus.   Neck: Normal range of motion. Neck supple. No JVD present. No thyromegaly present.   Cardiovascular: Regular rhythm, S1 normal and S2 normal. Bradycardia present. Exam reveals no gallop, no S3, no S4 and no friction rub.   No murmur heard.  Pulmonary/Chest: Effort normal and breath sounds normal. No stridor. No respiratory distress. He has no wheezes. He has no rales. He exhibits no tenderness.   Abdominal: Soft. Bowel sounds are normal. He exhibits no distension and no mass. There is no tenderness. There is no rebound.   Genitourinary:   Genitourinary Comments: Deferred   Musculoskeletal: Normal range of motion. He exhibits no edema, tenderness or deformity.   Lymphadenopathy:     He has no cervical adenopathy.   Neurological: He is alert and oriented to person, place, and time. He exhibits normal muscle tone. Coordination normal.   Skin: Skin is warm and dry. No rash noted. He is not diaphoretic. No erythema. No pallor.   Psychiatric: He has a normal mood and affect. His behavior is normal. Judgment and thought content normal.   Nursing note and vitals reviewed.      Lab Results   Component Value Date     01/25/2019    K 3.9 01/25/2019     01/25/2019    CO2 22 (L) 01/25/2019    BUN 17 01/25/2019    CREATININE 1.0 01/25/2019     01/25/2019    HGBA1C 5.7 (H) 11/05/2018    AST 31 01/25/2019    ALT 21 01/25/2019    ALBUMIN 2.7 (L) 01/25/2019    PROT 5.8 (L) 01/25/2019    BILITOT 0.8 01/25/2019    WBC 9.98 01/24/2019    HGB " 13.4 (L) 01/24/2019    HCT 39.9 (L) 01/24/2019    MCV 92 01/24/2019     (L) 01/24/2019    INR 1.0 01/22/2019    TSH 1.676 04/16/2018    CHOL 138 01/23/2019    HDL 46 01/23/2019    LDLCALC 80.8 01/23/2019    TRIG 56 01/23/2019     Assessment:      1. PAF (paroxysmal atrial fibrillation)    2. Thrombus of left atrial appendage    3. Hypertension associated with diabetes    4. Hyperlipidemia associated with type 2 diabetes mellitus    5. Controlled type 2 diabetes mellitus without complication, without long-term current use of insulin    6. Dementia without behavioral disturbance, unspecified dementia type        Plan:   1. PAF with recent OSCAR thrombus with slow HR  - cont Eliquis  - stop BB due to bradycardia  - Zio patch ordered    2. HTN  - hold BB due to bradycardia    3. HLD  - cont statin    4. DM2  - cont meds per PCP    5. Dementia eval  - refer to Neuro    6. Tooth pain  - f/u with dentist for abx/tx  - Hold Eliquis 2 days prior to any procedure needed and resume post     Thank you for allowing me to participate in this patient's care. Please do not hesitate to contact me with any questions or concerns. Consult note has been forwarded to the referral physician.

## 2019-05-20 ENCOUNTER — OFFICE VISIT (OUTPATIENT)
Dept: INTERNAL MEDICINE | Facility: CLINIC | Age: 79
End: 2019-05-20
Payer: MEDICARE

## 2019-05-20 ENCOUNTER — LAB VISIT (OUTPATIENT)
Dept: LAB | Facility: HOSPITAL | Age: 79
End: 2019-05-20
Attending: INTERNAL MEDICINE
Payer: MEDICARE

## 2019-05-20 VITALS
WEIGHT: 208.13 LBS | TEMPERATURE: 98 F | HEIGHT: 67 IN | BODY MASS INDEX: 32.67 KG/M2 | SYSTOLIC BLOOD PRESSURE: 146 MMHG | OXYGEN SATURATION: 97 % | HEART RATE: 50 BPM | DIASTOLIC BLOOD PRESSURE: 72 MMHG

## 2019-05-20 DIAGNOSIS — N13.8 BPH WITH OBSTRUCTION/LOWER URINARY TRACT SYMPTOMS: ICD-10-CM

## 2019-05-20 DIAGNOSIS — I51.3 THROMBUS OF LEFT ATRIAL APPENDAGE: ICD-10-CM

## 2019-05-20 DIAGNOSIS — Z01.00 EYE EXAM, ROUTINE: ICD-10-CM

## 2019-05-20 DIAGNOSIS — R55 SYNCOPE, UNSPECIFIED SYNCOPE TYPE: ICD-10-CM

## 2019-05-20 DIAGNOSIS — F41.9 ANXIETY: ICD-10-CM

## 2019-05-20 DIAGNOSIS — I48.0 PAF (PAROXYSMAL ATRIAL FIBRILLATION): ICD-10-CM

## 2019-05-20 DIAGNOSIS — E78.5 HYPERLIPIDEMIA ASSOCIATED WITH TYPE 2 DIABETES MELLITUS: ICD-10-CM

## 2019-05-20 DIAGNOSIS — E11.9 CONTROLLED TYPE 2 DIABETES MELLITUS WITHOUT COMPLICATION, WITHOUT LONG-TERM CURRENT USE OF INSULIN: ICD-10-CM

## 2019-05-20 DIAGNOSIS — E11.59 HYPERTENSION ASSOCIATED WITH DIABETES: ICD-10-CM

## 2019-05-20 DIAGNOSIS — N40.1 BPH WITH OBSTRUCTION/LOWER URINARY TRACT SYMPTOMS: ICD-10-CM

## 2019-05-20 DIAGNOSIS — F32.5 MAJOR DEPRESSIVE DISORDER IN FULL REMISSION, UNSPECIFIED WHETHER RECURRENT: ICD-10-CM

## 2019-05-20 DIAGNOSIS — K40.90 LEFT INGUINAL HERNIA: ICD-10-CM

## 2019-05-20 DIAGNOSIS — E11.69 HYPERLIPIDEMIA ASSOCIATED WITH TYPE 2 DIABETES MELLITUS: ICD-10-CM

## 2019-05-20 DIAGNOSIS — I70.0 AORTIC ATHEROSCLEROSIS: ICD-10-CM

## 2019-05-20 DIAGNOSIS — I15.2 HYPERTENSION ASSOCIATED WITH DIABETES: ICD-10-CM

## 2019-05-20 DIAGNOSIS — Z00.00 ENCOUNTER FOR PREVENTIVE HEALTH EXAMINATION: Primary | ICD-10-CM

## 2019-05-20 LAB
ALBUMIN SERPL BCP-MCNC: 3.6 G/DL (ref 3.5–5.2)
ALP SERPL-CCNC: 52 U/L (ref 55–135)
ALT SERPL W/O P-5'-P-CCNC: 16 U/L (ref 10–44)
ANION GAP SERPL CALC-SCNC: 10 MMOL/L (ref 8–16)
AST SERPL-CCNC: 22 U/L (ref 10–40)
BASOPHILS # BLD AUTO: 0.1 K/UL (ref 0–0.2)
BASOPHILS NFR BLD: 1.4 % (ref 0–1.9)
BILIRUB SERPL-MCNC: 0.7 MG/DL (ref 0.1–1)
BUN SERPL-MCNC: 23 MG/DL (ref 8–23)
CALCIUM SERPL-MCNC: 10.1 MG/DL (ref 8.7–10.5)
CHLORIDE SERPL-SCNC: 107 MMOL/L (ref 95–110)
CHOLEST SERPL-MCNC: 143 MG/DL (ref 120–199)
CHOLEST/HDLC SERPL: 2.8 {RATIO} (ref 2–5)
CO2 SERPL-SCNC: 24 MMOL/L (ref 23–29)
CREAT SERPL-MCNC: 1.1 MG/DL (ref 0.5–1.4)
DIFFERENTIAL METHOD: NORMAL
EOSINOPHIL # BLD AUTO: 0.4 K/UL (ref 0–0.5)
EOSINOPHIL NFR BLD: 5.8 % (ref 0–8)
ERYTHROCYTE [DISTWIDTH] IN BLOOD BY AUTOMATED COUNT: 12.8 % (ref 11.5–14.5)
EST. GFR  (AFRICAN AMERICAN): >60 ML/MIN/1.73 M^2
EST. GFR  (NON AFRICAN AMERICAN): >60 ML/MIN/1.73 M^2
GLUCOSE SERPL-MCNC: 115 MG/DL (ref 70–110)
HCT VFR BLD AUTO: 43.9 % (ref 40–54)
HDLC SERPL-MCNC: 51 MG/DL (ref 40–75)
HDLC SERPL: 35.7 % (ref 20–50)
HGB BLD-MCNC: 14.4 G/DL (ref 14–18)
IMM GRANULOCYTES # BLD AUTO: 0.03 K/UL (ref 0–0.04)
IMM GRANULOCYTES NFR BLD AUTO: 0.4 % (ref 0–0.5)
LDLC SERPL CALC-MCNC: 76.6 MG/DL (ref 63–159)
LYMPHOCYTES # BLD AUTO: 2.5 K/UL (ref 1–4.8)
LYMPHOCYTES NFR BLD: 35 % (ref 18–48)
MCH RBC QN AUTO: 30.2 PG (ref 27–31)
MCHC RBC AUTO-ENTMCNC: 32.8 G/DL (ref 32–36)
MCV RBC AUTO: 92 FL (ref 82–98)
MONOCYTES # BLD AUTO: 0.7 K/UL (ref 0.3–1)
MONOCYTES NFR BLD: 9.4 % (ref 4–15)
NEUTROPHILS # BLD AUTO: 3.4 K/UL (ref 1.8–7.7)
NEUTROPHILS NFR BLD: 48 % (ref 38–73)
NONHDLC SERPL-MCNC: 92 MG/DL
NRBC BLD-RTO: 0 /100 WBC
PLATELET # BLD AUTO: 247 K/UL (ref 150–350)
PMV BLD AUTO: 10.5 FL (ref 9.2–12.9)
POTASSIUM SERPL-SCNC: 3.9 MMOL/L (ref 3.5–5.1)
PROT SERPL-MCNC: 7.6 G/DL (ref 6–8.4)
RBC # BLD AUTO: 4.77 M/UL (ref 4.6–6.2)
SODIUM SERPL-SCNC: 141 MMOL/L (ref 136–145)
TRIGL SERPL-MCNC: 77 MG/DL (ref 30–150)
TSH SERPL DL<=0.005 MIU/L-ACNC: 1.05 UIU/ML (ref 0.4–4)
WBC # BLD AUTO: 7.05 K/UL (ref 3.9–12.7)

## 2019-05-20 PROCEDURE — 99999 PR PBB SHADOW E&M-EST. PATIENT-LVL V: CPT | Mod: PBBFAC,,, | Performed by: NURSE PRACTITIONER

## 2019-05-20 PROCEDURE — 85025 COMPLETE CBC W/AUTO DIFF WBC: CPT

## 2019-05-20 PROCEDURE — 83036 HEMOGLOBIN GLYCOSYLATED A1C: CPT

## 2019-05-20 PROCEDURE — 99999 PR PBB SHADOW E&M-EST. PATIENT-LVL V: ICD-10-PCS | Mod: PBBFAC,,, | Performed by: NURSE PRACTITIONER

## 2019-05-20 PROCEDURE — 99215 OFFICE O/P EST HI 40 MIN: CPT | Mod: PBBFAC,PO | Performed by: NURSE PRACTITIONER

## 2019-05-20 PROCEDURE — G0439 PR MEDICARE ANNUAL WELLNESS SUBSEQUENT VISIT: ICD-10-PCS | Mod: S$GLB,,, | Performed by: NURSE PRACTITIONER

## 2019-05-20 PROCEDURE — 80053 COMPREHEN METABOLIC PANEL: CPT

## 2019-05-20 PROCEDURE — 84443 ASSAY THYROID STIM HORMONE: CPT

## 2019-05-20 PROCEDURE — 80061 LIPID PANEL: CPT

## 2019-05-20 PROCEDURE — G0439 PPPS, SUBSEQ VISIT: HCPCS | Mod: S$GLB,,, | Performed by: NURSE PRACTITIONER

## 2019-05-20 PROCEDURE — 36415 COLL VENOUS BLD VENIPUNCTURE: CPT | Mod: PO

## 2019-05-20 RX ORDER — CEPHALEXIN 500 MG/1
CAPSULE ORAL
COMMUNITY
Start: 2019-05-14 | End: 2019-05-20

## 2019-05-20 NOTE — PATIENT INSTRUCTIONS
Counseling and Referral of Other Preventative  (Italic type indicates deductible and co-insurance are waived)    Patient Name: Bi Jessica  Today's Date: 5/20/2019    Health Maintenance       Date Due Completion Date    TETANUS VACCINE 04/21/1958 ---    Shingles Vaccine (2 of 3) 10/26/2014 8/31/2014    Eye Exam 12/12/2017 12/12/2016    Override on 12/3/2015: Done (DR LONG VENEGAS. NO DIABETIC RETINOPATHY)    PROSTATE-SPECIFIC ANTIGEN 10/10/2018 10/10/2017    Foot Exam 10/16/2018 10/16/2017    Override on 7/9/2015: Done    Hemoglobin A1c 05/05/2019 11/5/2018    Influenza Vaccine 08/01/2019 11/13/2018    Urine Microalbumin 01/22/2020 1/22/2019    Lipid Panel 01/23/2020 1/23/2019    Colonoscopy 05/01/2022 5/1/2017        Orders Placed This Encounter   Procedures    Ambulatory Referral to Optometry     The following information is provided to all patients.  This information is to help you find resources for any of the problems found today that may be affecting your health:                Living healthy guide: www.[facility].[place].gov      Understanding Diabetes: www.diabetes.org      Eating healthy: www.cdc.gov/healthyweight      CDC home safety checklist: www.cdc.gov/steadi/patient.html      Agency on Aging: www.goea.[place].[facility]      Alcoholics anonymous (AA): www.aa.org      Physical Activity: www.[place].nih.gov/hf1wtlk      Tobacco use: www.quitwithusla.org

## 2019-05-20 NOTE — PROGRESS NOTES
"Bi Jessica presented for a  Medicare AWV and comprehensive Health Risk Assessment today. The following components were reviewed and updated:    · Medical history  · Family History  · Social history  · Allergies and Current Medications  · Health Risk Assessment  · Health Maintenance  · Care Team     ** See Completed Assessments for Annual Wellness Visit within the encounter summary.**       The following assessments were completed:  · Living Situation  · CAGE  · Depression Screening  · Timed Get Up and Go  · Whisper Test  · Cognitive Function Screening  · Nutrition Screening  · ADL Screening  · PAQ Screening    Vitals:    05/20/19 0720   BP: (!) 146/72   BP Location: Right arm   Pulse: (!) 50   Temp: 97.9 °F (36.6 °C)   TempSrc: Tympanic   SpO2: 97%   Weight: 94.4 kg (208 lb 1.8 oz)   Height: 5' 7" (1.702 m)     Body mass index is 32.6 kg/m².  Physical Exam   Constitutional: He is oriented to person, place, and time. He appears well-developed and well-nourished. No distress.   HENT:   Head: Normocephalic and atraumatic.   Mouth/Throat: Oropharynx is clear and moist. No oropharyngeal exudate.   Eyes: Conjunctivae are normal.   Neck: Normal range of motion. Neck supple. No JVD present. No tracheal deviation present. No thyromegaly present.   No carotid bruits   Cardiovascular: Normal rate, regular rhythm, normal heart sounds and intact distal pulses. Exam reveals no gallop and no friction rub.   No murmur heard.  Pulmonary/Chest: Effort normal and breath sounds normal. No respiratory distress. He has no wheezes. He has no rales. He exhibits no tenderness.   Abdominal: Soft. Bowel sounds are normal. He exhibits no distension and no mass. There is no tenderness. There is no rebound and no guarding. No hernia.   No abd bruits   Musculoskeletal: Normal range of motion. He exhibits no edema or tenderness.   Lymphadenopathy:     He has no cervical adenopathy.   Neurological: He is alert and oriented to person, place, and " time. No cranial nerve deficit.   Bilateral feet monofilament testing done, normal, no skin lesions or breakdowns   Skin: Skin is warm and dry. He is not diaphoretic.   Psychiatric: He has a normal mood and affect. His behavior is normal.         Diagnoses and health risks identified today and associated recommendations/orders:    1. Eye exam, routine  - Ambulatory Referral to Optometry    2. Encounter for preventive health examination  Screenings performed, as noted above.  Personal preventative testing needs reviewed.     3. Hypertension associated with diabetes  Monitored/treated on meds, continue the same tx, stable, sees Dr Pandey    4. Hyperlipidemia associated with type 2 diabetes mellitus  Monitored/treated on meds, continue the same tx, stable, last LDL 80.8    5. 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 Anxiety  Monitored/treated on meds, continue the same tx, stable    7. PAF (paroxysmal atrial fibrillation)  Monitored/treated on meds, continue the same tx, stable, sees Dr Pandey    8. Thrombus of left atrial appendage  Monitored/treated on meds, continue the same tx, stable, on eliquis, sees Dr Pandey    9. BPH with obstruction/lower urinary tract symptoms  Monitored/treated on meds, continue the same tx, stable, no complaints    10. Controlled type 2 diabetes mellitus without complication, without long-term current use of insulin  Monitored/treated on meds, continue the same tx, stable    11. Left inguinal hernia  Monitored/treated on meds, continue the same tx, stable, no pain, not incarcerated     12. Syncope, unspecified syncope type  Monitored/treated on meds, continue the same tx, stable, no dizziness today    13. Aortic atherosclerosis  Monitored/treated on meds, continue the same tx, stable, no chest pain      Provided Bi with a 5-10 year written screening schedule and personal prevention  plan. Recommendations were developed using the USPSTF age appropriate recommendations. Education, counseling, and referrals were provided as needed. After Visit Summary printed and given to patient which includes a list of additional screenings\tests needed.    No follow-ups on file.    Dallin Jacome NP

## 2019-05-21 LAB
ESTIMATED AVG GLUCOSE: 114 MG/DL (ref 68–131)
HBA1C MFR BLD HPLC: 5.6 % (ref 4–5.6)

## 2019-05-27 ENCOUNTER — OFFICE VISIT (OUTPATIENT)
Dept: INTERNAL MEDICINE | Facility: CLINIC | Age: 79
End: 2019-05-27
Payer: MEDICARE

## 2019-05-27 VITALS
HEIGHT: 67 IN | BODY MASS INDEX: 33.19 KG/M2 | RESPIRATION RATE: 16 BRPM | DIASTOLIC BLOOD PRESSURE: 76 MMHG | SYSTOLIC BLOOD PRESSURE: 130 MMHG | OXYGEN SATURATION: 98 % | WEIGHT: 211.44 LBS | TEMPERATURE: 98 F | HEART RATE: 50 BPM

## 2019-05-27 DIAGNOSIS — I70.0 AORTIC ATHEROSCLEROSIS: ICD-10-CM

## 2019-05-27 DIAGNOSIS — E11.9 CONTROLLED TYPE 2 DIABETES MELLITUS WITHOUT COMPLICATION, WITHOUT LONG-TERM CURRENT USE OF INSULIN: ICD-10-CM

## 2019-05-27 DIAGNOSIS — E11.69 HYPERLIPIDEMIA ASSOCIATED WITH TYPE 2 DIABETES MELLITUS: Primary | ICD-10-CM

## 2019-05-27 DIAGNOSIS — I48.0 PAF (PAROXYSMAL ATRIAL FIBRILLATION): ICD-10-CM

## 2019-05-27 DIAGNOSIS — I15.2 HYPERTENSION ASSOCIATED WITH DIABETES: ICD-10-CM

## 2019-05-27 DIAGNOSIS — E11.59 HYPERTENSION ASSOCIATED WITH DIABETES: ICD-10-CM

## 2019-05-27 DIAGNOSIS — E78.5 HYPERLIPIDEMIA ASSOCIATED WITH TYPE 2 DIABETES MELLITUS: Primary | ICD-10-CM

## 2019-05-27 PROCEDURE — 99213 OFFICE O/P EST LOW 20 MIN: CPT | Mod: PBBFAC,PO | Performed by: INTERNAL MEDICINE

## 2019-05-27 PROCEDURE — 99999 PR PBB SHADOW E&M-EST. PATIENT-LVL III: ICD-10-PCS | Mod: PBBFAC,,, | Performed by: INTERNAL MEDICINE

## 2019-05-27 PROCEDURE — 99214 PR OFFICE/OUTPT VISIT, EST, LEVL IV, 30-39 MIN: ICD-10-PCS | Mod: S$PBB,,, | Performed by: INTERNAL MEDICINE

## 2019-05-27 PROCEDURE — 99214 OFFICE O/P EST MOD 30 MIN: CPT | Mod: S$PBB,,, | Performed by: INTERNAL MEDICINE

## 2019-05-27 PROCEDURE — 99999 PR PBB SHADOW E&M-EST. PATIENT-LVL III: CPT | Mod: PBBFAC,,, | Performed by: INTERNAL MEDICINE

## 2019-05-27 NOTE — PROGRESS NOTES
"HPI:  Patient is an 79-year-old man who comes in today for follow-up of his hypertension, lipids, diabetes and AFib.  Patient has been back in sinus rhythm.  He still on Eliquis.  His blood sugars have been doing very well.  He denies any hypoglycemic events.  There been no other new problems or complaints.    Current meds have been verified and updated per the EMR  Exam:/76   Pulse (!) 50   Temp 98.4 °F (36.9 °C)   Resp 16   Ht 5' 7" (1.702 m)   Wt 95.9 kg (211 lb 6.7 oz)   SpO2 98%   BMI 33.11 kg/m²   Carotids 2+ equal without bruits  Chest clear  Cardiovascular regular rate and rhythm without murmur gallop or rub    Lab Results   Component Value Date    WBC 7.05 05/20/2019    HGB 14.4 05/20/2019    HCT 43.9 05/20/2019     05/20/2019    CHOL 143 05/20/2019    TRIG 77 05/20/2019    HDL 51 05/20/2019    ALT 16 05/20/2019    AST 22 05/20/2019     05/20/2019    K 3.9 05/20/2019     05/20/2019    CREATININE 1.1 05/20/2019    BUN 23 05/20/2019    CO2 24 05/20/2019    TSH 1.054 05/20/2019    PSA 2.9 10/10/2017    INR 1.0 01/22/2019    HGBA1C 5.6 05/20/2019       Impression:  Stable medical problems below  Patient Active Problem List   Diagnosis    Anxiety    Hyperlipidemia associated with type 2 diabetes mellitus    Hypertension associated with diabetes    Diabetes mellitus type II, controlled    Left inguinal hernia    History of colon polyps    BPH with obstruction/lower urinary tract symptoms    PAF (paroxysmal atrial fibrillation)    Major depression in full remission    Aortic atherosclerosis       Plan:  Orders Placed This Encounter    Hemoglobin A1c    Comprehensive metabolic panel    Lipid panel    Protein / creatinine ratio, urine    TSH    CBC auto differential     Medications remain the same.  He will be seen again in 6 months with above lab work    This note is generated with speech recognition software and is subject to transcription error and sound alike " phrases that may be missed by proofreading.

## 2019-06-10 RX ORDER — SIMVASTATIN 40 MG/1
TABLET, FILM COATED ORAL
Qty: 90 TABLET | Refills: 3 | Status: SHIPPED | OUTPATIENT
Start: 2019-06-10 | End: 2020-05-25 | Stop reason: SDUPTHER

## 2019-06-10 RX ORDER — METFORMIN HYDROCHLORIDE 850 MG/1
TABLET ORAL
Qty: 90 TABLET | Refills: 3 | Status: SHIPPED | OUTPATIENT
Start: 2019-06-10 | End: 2020-05-25 | Stop reason: SDUPTHER

## 2019-06-24 ENCOUNTER — CLINICAL SUPPORT (OUTPATIENT)
Dept: CARDIOLOGY | Facility: CLINIC | Age: 79
End: 2019-06-24
Attending: INTERNAL MEDICINE
Payer: MEDICARE

## 2019-06-24 DIAGNOSIS — I48.0 PAF (PAROXYSMAL ATRIAL FIBRILLATION): ICD-10-CM

## 2019-06-24 PROCEDURE — 0298T HOLTER MONITOR - 3-14 DAY ADULT: CPT | Mod: ,,, | Performed by: INTERNAL MEDICINE

## 2019-06-24 PROCEDURE — 0296T HOLTER MONITOR - 3-14 DAY ADULT: CPT | Mod: PBBFAC | Performed by: INTERNAL MEDICINE

## 2019-06-24 PROCEDURE — 0298T HOLTER MONITOR - 3-14 DAY ADULT: ICD-10-PCS | Mod: ,,, | Performed by: INTERNAL MEDICINE

## 2019-07-31 ENCOUNTER — TELEPHONE (OUTPATIENT)
Dept: CARDIOLOGY | Facility: CLINIC | Age: 79
End: 2019-07-31

## 2019-07-31 NOTE — TELEPHONE ENCOUNTER
Called to patient--spoke with patient's daughter Huong--gave results of holter--abnormal result. Heart rate average 51 with extra frequent beats, Afib and Vtach noted. Refer to EP to discuss further manage and possible need for pacemaker--Huong states she lives in [place] and will need to accompany patient to appointment with EP--states will be available the week of 8/12/19--- informed will give a call back to offer appointment date and time for next week once speak with Dr. Monroe about adding patient to schedule on 8/14/19--Huong verbalizes understanding

## 2019-07-31 NOTE — TELEPHONE ENCOUNTER
----- Message from Ally Mesa sent at 7/31/2019 12:24 PM CDT -----  Contact: Huong/daughter  Type:  Patient Returning Call    Who Called: Huong  Who Left Message for Patient: She didn't catch the name  Does the patient know what this is regarding?: She is not sure  Would the patient rather a call back or a response via [facility]? Call back  Best Call Back Number: Please call her at [phone]  Additional Information: n/a

## 2019-07-31 NOTE — TELEPHONE ENCOUNTER
Called to patient and left voice message to call our back at [phone] in reference to results of holter monitor and scheduling appointment with EP---    --abnormal result. Heart rate average 51 with extra frequent beats, Afib and Vtach noted. Refer to EP to discuss further manage and possible need for pacemaker--

## 2019-07-31 NOTE — TELEPHONE ENCOUNTER
----- Message from Charbel Pandey MD sent at 7/25/2019  8:14 AM CDT -----  Please call the patient regarding his abnormal result. Heart rate average 51 with extra frequent beats, Afib and Vtach noted. Refer to EP to discuss further manage and possible need for pacemaker.

## 2019-08-05 ENCOUNTER — TELEPHONE (OUTPATIENT)
Dept: CARDIOLOGY | Facility: CLINIC | Age: 79
End: 2019-08-05

## 2019-08-05 NOTE — TELEPHONE ENCOUNTER
----- Message from Michelle Tobar sent at 8/5/2019 11:21 AM CDT -----  Contact: Daughter Ms Card  Would like to consult with the nurse, Daughter has some question concerning an Appt that the patient is trying to get schedule, please call back at [phone], thanks sj

## 2019-08-09 DIAGNOSIS — I48.0 PAF (PAROXYSMAL ATRIAL FIBRILLATION): Primary | ICD-10-CM

## 2019-08-14 ENCOUNTER — CLINICAL SUPPORT (OUTPATIENT)
Dept: CARDIOLOGY | Facility: CLINIC | Age: 79
End: 2019-08-14
Payer: MEDICARE

## 2019-08-14 ENCOUNTER — OFFICE VISIT (OUTPATIENT)
Dept: CARDIOLOGY | Facility: CLINIC | Age: 79
End: 2019-08-14
Payer: MEDICARE

## 2019-08-14 VITALS
WEIGHT: 203.06 LBS | HEIGHT: 67 IN | SYSTOLIC BLOOD PRESSURE: 160 MMHG | HEART RATE: 49 BPM | BODY MASS INDEX: 31.87 KG/M2 | DIASTOLIC BLOOD PRESSURE: 82 MMHG

## 2019-08-14 DIAGNOSIS — I15.2 HYPERTENSION ASSOCIATED WITH DIABETES: ICD-10-CM

## 2019-08-14 DIAGNOSIS — I48.0 PAF (PAROXYSMAL ATRIAL FIBRILLATION): ICD-10-CM

## 2019-08-14 DIAGNOSIS — I48.0 PAF (PAROXYSMAL ATRIAL FIBRILLATION): Primary | ICD-10-CM

## 2019-08-14 DIAGNOSIS — E11.59 HYPERTENSION ASSOCIATED WITH DIABETES: ICD-10-CM

## 2019-08-14 PROCEDURE — 99205 PR OFFICE/OUTPT VISIT, NEW, LEVL V, 60-74 MIN: ICD-10-PCS | Mod: S$PBB,,, | Performed by: INTERNAL MEDICINE

## 2019-08-14 PROCEDURE — 99999 PR PBB SHADOW E&M-EST. PATIENT-LVL II: CPT | Mod: PBBFAC,,, | Performed by: INTERNAL MEDICINE

## 2019-08-14 PROCEDURE — 99999 PR PBB SHADOW E&M-EST. PATIENT-LVL II: ICD-10-PCS | Mod: PBBFAC,,, | Performed by: INTERNAL MEDICINE

## 2019-08-14 PROCEDURE — 99212 OFFICE O/P EST SF 10 MIN: CPT | Mod: PBBFAC | Performed by: INTERNAL MEDICINE

## 2019-08-14 PROCEDURE — 99205 OFFICE O/P NEW HI 60 MIN: CPT | Mod: S$PBB,,, | Performed by: INTERNAL MEDICINE

## 2019-08-14 PROCEDURE — 93010 EKG 12-LEAD: ICD-10-PCS | Mod: S$PBB,,, | Performed by: INTERNAL MEDICINE

## 2019-08-14 PROCEDURE — 93010 ELECTROCARDIOGRAM REPORT: CPT | Mod: S$PBB,,, | Performed by: INTERNAL MEDICINE

## 2019-08-14 PROCEDURE — 93005 ELECTROCARDIOGRAM TRACING: CPT | Mod: PBBFAC | Performed by: INTERNAL MEDICINE

## 2019-08-14 NOTE — PROGRESS NOTES
Subjective:    Patient ID:  Bi Jessica is a 79 y.o. male who presents for evaluation of PAF      79 yoM here for AF management. He had a syncopal episode leading to admission 1/22/19. He took excess sleeping aids that day and had nocturnal micturation syncope. He was found to be in AF. GANESH revealed OSCAR thrombus. He spontaneously converted to NSR. He was placed on eliquis on discharge. No rate control agents were used due to bradycardia. He has had no subsequent syncopal events. His average rates are in the 50s but he was able to reach >100 bpm with activity. EF normal. CHADSVASC of 4.     GANESH 1/19:  CONCLUSIONS     1 -  with spontaneous echo contrast with adherent thrombus.     2 - No visualized thrombus in the left atrium.     3 - Normal left ventricular systolic function (EF 55-60%).     4 - Indeterminate LV diastolic function.     5 - Mild mitral regurgitation.     6 - Mild tricuspid regurgitation.     7 - Grade 1 atheroma disease of aorta.     8 - OSCAR thrombus present.     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  No date: Depression  No date: Diabetes mellitus type II, controlled  No date: Hyperlipidemia associated with type 2 diabetes mellitus  No date: Hypertension associated with diabetes    Past Surgical History:  1/23/2019: CARDIOVERSION; N/A      Comment:  Performed by Charbel Pandey MD at [facility] CATH LAB  5/1/2017: COLONOSCOPY; N/A      Comment:  Performed by Vishal Andrade MD at [facility] ENDO  1/23/2019: ECHOCARDIOGRAM,TRANSESOPHAGEAL; N/A      Comment:  Performed by Charbel Pandey MD at [facility] CATH LAB  No date: TONSILLECTOMY    Social History    Socioeconomic History      Marital status:       Spouse name: Not on file      Number of children: Not on file      Years of education: Not on file      Highest education level: Not on file    Occupational History      Not on file    Social Needs      Financial resource  strain: Not on file      Food insecurity:        Worry: Not on file        Inability: Not on file      Transportation needs:        Medical: Not on file        Non-medical: Not on file    Tobacco Use      Smoking status: Former Smoker      Smokeless tobacco: Never Used    Substance and Sexual Activity      Alcohol use: Not Currently        Alcohol/week: 0.0 oz        Comment: occasionally       Drug use: No      Sexual activity: Never    Lifestyle      Physical activity:        Days per week: Not on file        Minutes per session: Not on file      Stress: Not on file    Relationships      Social connections:        Talks on phone: Not on file        Gets together: Not on file        Attends Denominational service: Not on file        Active member of club or organization: Not on file        Attends meetings of clubs or organizations: Not on file        Relationship status: Not on file    Other Topics      Concerns:        Not on file    Social History Narrative      Not on file    Review of patient's family history indicates:  Problem: Heart disease      Relation: Mother          Age of Onset: (Not Specified)  Problem: Heart disease      Relation: Father          Age of Onset: (Not Specified)  Problem: Heart disease      Relation: Brother          Age of Onset: (Not Specified)  Problem: Cancer      Relation: Brother          Age of Onset: (Not Specified)          Review of Systems   Constitution: Negative.   HENT: Negative.    Eyes: Negative.    Cardiovascular: Positive for syncope. Negative for chest pain, dyspnea on exertion, leg swelling, near-syncope and palpitations.   Respiratory: Negative.  Negative for shortness of breath.    Endocrine: Negative.    Hematologic/Lymphatic: Negative.    Skin: Negative.    Musculoskeletal: Negative.    Gastrointestinal: Negative.    Genitourinary: Negative.    Neurological: Negative for dizziness and light-headedness.   Psychiatric/Behavioral: Negative.    Allergic/Immunologic:  Negative.         Objective:    Physical Exam   Constitutional: He is oriented to person, place, and time. He appears well-developed and well-nourished. No distress.   HENT:   Head: Normocephalic and atraumatic.   Eyes: Pupils are equal, round, and reactive to light. EOM are normal.   Neck: Normal range of motion. No JVD present. No thyromegaly present.   Cardiovascular: Regular rhythm, S1 normal, S2 normal and normal heart sounds. Bradycardia present. PMI is not displaced. Exam reveals no gallop and no friction rub.   No murmur heard.  Pulmonary/Chest: Effort normal and breath sounds normal. No respiratory distress. He has no wheezes. He has no rales.   Abdominal: Soft. Bowel sounds are normal. He exhibits no distension. There is no tenderness. There is no rebound and no guarding.   Musculoskeletal: Normal range of motion. He exhibits no edema or tenderness.   Neurological: He is alert and oriented to person, place, and time. No cranial nerve deficit.   Skin: Skin is warm and dry. No rash noted. No erythema.   Psychiatric: He has a normal mood and affect. His behavior is normal. Judgment and thought content normal.   Vitals reviewed.    ECG: SBr  ms, nl QRS        Assessment:       1. PAF (paroxysmal atrial fibrillation)    2. Hypertension associated with diabetes         Plan:       79 yoM AF here for arrhythmia management and assessment for PM. I discussed AF and its basic pathophysiology, including its health implications and treatment options with the patient. Specifically, I addressed the need for CVA prophylaxis as well as the goal to reduce symptomatic arrhythmic episodes by pharmacologic and/or procedural methods. He is on appropriate CVA prophylaxis with apixaban. He has a single known episode of AF. He has resting bradycardia but can achieve >100 bpm with activity. No indication for PM at this time. RTC 6m

## 2019-10-14 ENCOUNTER — TELEPHONE (OUTPATIENT)
Dept: INTERNAL MEDICINE | Facility: CLINIC | Age: 79
End: 2019-10-14

## 2019-10-14 NOTE — TELEPHONE ENCOUNTER
----- Message from Ally Mesa sent at 10/14/2019 10:18 AM CDT -----  Contact: Patient  Patient is still waiting on call back regards to his lest message that he left about getting an appointment. Please call him at [phone] or [phone].    Thanks  Td

## 2019-10-21 ENCOUNTER — TELEPHONE (OUTPATIENT)
Dept: INTERNAL MEDICINE | Facility: CLINIC | Age: 79
End: 2019-10-21

## 2019-10-21 ENCOUNTER — OFFICE VISIT (OUTPATIENT)
Dept: INTERNAL MEDICINE | Facility: CLINIC | Age: 79
End: 2019-10-21
Payer: MEDICARE

## 2019-10-21 VITALS
OXYGEN SATURATION: 98 % | DIASTOLIC BLOOD PRESSURE: 80 MMHG | BODY MASS INDEX: 32.67 KG/M2 | HEART RATE: 42 BPM | WEIGHT: 208.13 LBS | TEMPERATURE: 98 F | HEIGHT: 67 IN | SYSTOLIC BLOOD PRESSURE: 136 MMHG

## 2019-10-21 DIAGNOSIS — F32.A DEPRESSION, UNSPECIFIED DEPRESSION TYPE: Primary | ICD-10-CM

## 2019-10-21 DIAGNOSIS — E11.59 HYPERTENSION ASSOCIATED WITH DIABETES: ICD-10-CM

## 2019-10-21 DIAGNOSIS — I15.2 HYPERTENSION ASSOCIATED WITH DIABETES: ICD-10-CM

## 2019-10-21 PROCEDURE — 99213 OFFICE O/P EST LOW 20 MIN: CPT | Mod: PBBFAC,PO,25 | Performed by: INTERNAL MEDICINE

## 2019-10-21 PROCEDURE — 99213 PR OFFICE/OUTPT VISIT, EST, LEVL III, 20-29 MIN: ICD-10-PCS | Mod: 25,S$PBB,, | Performed by: INTERNAL MEDICINE

## 2019-10-21 PROCEDURE — 99999 PR PBB SHADOW E&M-EST. PATIENT-LVL III: CPT | Mod: PBBFAC,,, | Performed by: INTERNAL MEDICINE

## 2019-10-21 PROCEDURE — 99213 OFFICE O/P EST LOW 20 MIN: CPT | Mod: 25,S$PBB,, | Performed by: INTERNAL MEDICINE

## 2019-10-21 PROCEDURE — 99999 PR PBB SHADOW E&M-EST. PATIENT-LVL III: ICD-10-PCS | Mod: PBBFAC,,, | Performed by: INTERNAL MEDICINE

## 2019-10-21 PROCEDURE — 90662 IIV NO PRSV INCREASED AG IM: CPT | Mod: PBBFAC,PO

## 2019-10-21 RX ORDER — TRAZODONE HYDROCHLORIDE 50 MG/1
50 TABLET ORAL NIGHTLY
Qty: 30 TABLET | Refills: 11 | Status: SHIPPED | OUTPATIENT
Start: 2019-10-21 | End: 2020-02-10

## 2019-10-21 NOTE — TELEPHONE ENCOUNTER
----- Message from Sherin Lopez sent at 10/21/2019  4:24 PM CDT -----  Contact: Pt  Pt would like nurse to contact him, pt states non one told him weather he could operate his vehicle or not. Pt asked that nurse return his call at ([phone])

## 2019-10-21 NOTE — PROGRESS NOTES
"HPI:  Patient is 79-year-old man who comes today at the request of his daughter for treatment of depression.  His daughter states that he is always the Drea downer.  Patient states he also has trouble sleeping at night.  He still works at [employer] fluctuating between days and nights.  He himself does feel like he occasionally is depressed but most time he thinks he does okay.    Current meds have been verified and updated per the EMR  Exam:/80   Pulse (!) 42   Temp 97.6 °F (36.4 °C) (Tympanic)   Ht 5' 7" (1.702 m)   Wt 94.4 kg (208 lb 1.8 oz)   SpO2 98%   BMI 32.60 kg/m²   Exam deferred    Lab Results   Component Value Date    WBC 7.05 05/20/2019    HGB 14.4 05/20/2019    HCT 43.9 05/20/2019     05/20/2019    CHOL 143 05/20/2019    TRIG 77 05/20/2019    HDL 51 05/20/2019    ALT 16 05/20/2019    AST 22 05/20/2019     05/20/2019    K 3.9 05/20/2019     05/20/2019    CREATININE 1.1 05/20/2019    BUN 23 05/20/2019    CO2 24 05/20/2019    TSH 1.054 05/20/2019    PSA 2.9 10/10/2017    INR 1.0 01/22/2019    HGBA1C 5.6 05/20/2019       Impression:  Depression/insomnia  Patient Active Problem List   Diagnosis    Anxiety    Hyperlipidemia associated with type 2 diabetes mellitus    Hypertension associated with diabetes    Diabetes mellitus type II, controlled    Left inguinal hernia    History of colon polyps    BPH with obstruction/lower urinary tract symptoms    PAF (paroxysmal atrial fibrillation)    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  Patient was started on trazodone 50 mg at bedtime.  Patient will receive the flu shot today.      This note is generated with speech recognition software and is subject to transcription error and sound alike phrases that may be missed by proofreading.    "

## 2019-10-21 NOTE — TELEPHONE ENCOUNTER
Notified patient he is to take medication one to one half hour before bedtime. His is not to take it during the day.

## 2019-11-18 ENCOUNTER — LAB VISIT (OUTPATIENT)
Dept: LAB | Facility: HOSPITAL | Age: 79
End: 2019-11-18
Attending: INTERNAL MEDICINE
Payer: MEDICARE

## 2019-11-18 DIAGNOSIS — E11.9 CONTROLLED TYPE 2 DIABETES MELLITUS WITHOUT COMPLICATION, WITHOUT LONG-TERM CURRENT USE OF INSULIN: ICD-10-CM

## 2019-11-18 LAB
ALBUMIN SERPL BCP-MCNC: 3.7 G/DL (ref 3.5–5.2)
ALP SERPL-CCNC: 57 U/L (ref 55–135)
ALT SERPL W/O P-5'-P-CCNC: 16 U/L (ref 10–44)
ANION GAP SERPL CALC-SCNC: 7 MMOL/L (ref 8–16)
AST SERPL-CCNC: 21 U/L (ref 10–40)
BASOPHILS # BLD AUTO: 0.07 K/UL (ref 0–0.2)
BASOPHILS NFR BLD: 1 % (ref 0–1.9)
BILIRUB SERPL-MCNC: 0.8 MG/DL (ref 0.1–1)
BUN SERPL-MCNC: 22 MG/DL (ref 8–23)
CALCIUM SERPL-MCNC: 9.7 MG/DL (ref 8.7–10.5)
CHLORIDE SERPL-SCNC: 108 MMOL/L (ref 95–110)
CHOLEST SERPL-MCNC: 142 MG/DL (ref 120–199)
CHOLEST/HDLC SERPL: 2.3 {RATIO} (ref 2–5)
CO2 SERPL-SCNC: 28 MMOL/L (ref 23–29)
CREAT SERPL-MCNC: 1.1 MG/DL (ref 0.5–1.4)
DIFFERENTIAL METHOD: ABNORMAL
EOSINOPHIL # BLD AUTO: 0.5 K/UL (ref 0–0.5)
EOSINOPHIL NFR BLD: 6.7 % (ref 0–8)
ERYTHROCYTE [DISTWIDTH] IN BLOOD BY AUTOMATED COUNT: 12.8 % (ref 11.5–14.5)
EST. GFR  (AFRICAN AMERICAN): >60 ML/MIN/1.73 M^2
EST. GFR  (NON AFRICAN AMERICAN): >60 ML/MIN/1.73 M^2
ESTIMATED AVG GLUCOSE: 114 MG/DL (ref 68–131)
GLUCOSE SERPL-MCNC: 104 MG/DL (ref 70–110)
HBA1C MFR BLD HPLC: 5.6 % (ref 4–5.6)
HCT VFR BLD AUTO: 45.4 % (ref 40–54)
HDLC SERPL-MCNC: 61 MG/DL (ref 40–75)
HDLC SERPL: 43 % (ref 20–50)
HGB BLD-MCNC: 14.6 G/DL (ref 14–18)
IMM GRANULOCYTES # BLD AUTO: 0.03 K/UL (ref 0–0.04)
IMM GRANULOCYTES NFR BLD AUTO: 0.4 % (ref 0–0.5)
LDLC SERPL CALC-MCNC: 70.2 MG/DL (ref 63–159)
LYMPHOCYTES # BLD AUTO: 2.6 K/UL (ref 1–4.8)
LYMPHOCYTES NFR BLD: 35.6 % (ref 18–48)
MCH RBC QN AUTO: 31.3 PG (ref 27–31)
MCHC RBC AUTO-ENTMCNC: 32.2 G/DL (ref 32–36)
MCV RBC AUTO: 97 FL (ref 82–98)
MONOCYTES # BLD AUTO: 0.7 K/UL (ref 0.3–1)
MONOCYTES NFR BLD: 9 % (ref 4–15)
NEUTROPHILS # BLD AUTO: 3.4 K/UL (ref 1.8–7.7)
NEUTROPHILS NFR BLD: 47.3 % (ref 38–73)
NONHDLC SERPL-MCNC: 81 MG/DL
NRBC BLD-RTO: 0 /100 WBC
PLATELET # BLD AUTO: 176 K/UL (ref 150–350)
PMV BLD AUTO: 10.9 FL (ref 9.2–12.9)
POTASSIUM SERPL-SCNC: 4.5 MMOL/L (ref 3.5–5.1)
PROT SERPL-MCNC: 7 G/DL (ref 6–8.4)
RBC # BLD AUTO: 4.67 M/UL (ref 4.6–6.2)
SODIUM SERPL-SCNC: 143 MMOL/L (ref 136–145)
TRIGL SERPL-MCNC: 54 MG/DL (ref 30–150)
TSH SERPL DL<=0.005 MIU/L-ACNC: 1.63 UIU/ML (ref 0.4–4)
WBC # BLD AUTO: 7.2 K/UL (ref 3.9–12.7)

## 2019-11-18 PROCEDURE — 84443 ASSAY THYROID STIM HORMONE: CPT

## 2019-11-18 PROCEDURE — 85025 COMPLETE CBC W/AUTO DIFF WBC: CPT

## 2019-11-18 PROCEDURE — 80053 COMPREHEN METABOLIC PANEL: CPT

## 2019-11-18 PROCEDURE — 83036 HEMOGLOBIN GLYCOSYLATED A1C: CPT

## 2019-11-18 PROCEDURE — 36415 COLL VENOUS BLD VENIPUNCTURE: CPT | Mod: PO

## 2019-11-18 PROCEDURE — 80061 LIPID PANEL: CPT

## 2019-11-25 ENCOUNTER — OFFICE VISIT (OUTPATIENT)
Dept: INTERNAL MEDICINE | Facility: CLINIC | Age: 79
End: 2019-11-25
Payer: MEDICARE

## 2019-11-25 VITALS
TEMPERATURE: 98 F | BODY MASS INDEX: 29.2 KG/M2 | SYSTOLIC BLOOD PRESSURE: 138 MMHG | OXYGEN SATURATION: 98 % | DIASTOLIC BLOOD PRESSURE: 82 MMHG | WEIGHT: 208.56 LBS | HEIGHT: 71 IN

## 2019-11-25 DIAGNOSIS — I48.0 PAF (PAROXYSMAL ATRIAL FIBRILLATION): ICD-10-CM

## 2019-11-25 DIAGNOSIS — E11.69 HYPERLIPIDEMIA ASSOCIATED WITH TYPE 2 DIABETES MELLITUS: ICD-10-CM

## 2019-11-25 DIAGNOSIS — E11.9 CONTROLLED TYPE 2 DIABETES MELLITUS WITHOUT COMPLICATION, WITHOUT LONG-TERM CURRENT USE OF INSULIN: Primary | ICD-10-CM

## 2019-11-25 DIAGNOSIS — E11.59 HYPERTENSION ASSOCIATED WITH DIABETES: ICD-10-CM

## 2019-11-25 DIAGNOSIS — I70.0 AORTIC ATHEROSCLEROSIS: ICD-10-CM

## 2019-11-25 DIAGNOSIS — I15.2 HYPERTENSION ASSOCIATED WITH DIABETES: ICD-10-CM

## 2019-11-25 DIAGNOSIS — E78.5 HYPERLIPIDEMIA ASSOCIATED WITH TYPE 2 DIABETES MELLITUS: ICD-10-CM

## 2019-11-25 PROCEDURE — 99214 PR OFFICE/OUTPT VISIT, EST, LEVL IV, 30-39 MIN: ICD-10-PCS | Mod: S$PBB,,, | Performed by: INTERNAL MEDICINE

## 2019-11-25 PROCEDURE — 99213 OFFICE O/P EST LOW 20 MIN: CPT | Mod: PBBFAC,PO | Performed by: INTERNAL MEDICINE

## 2019-11-25 PROCEDURE — 99214 OFFICE O/P EST MOD 30 MIN: CPT | Mod: S$PBB,,, | Performed by: INTERNAL MEDICINE

## 2019-11-25 PROCEDURE — 1126F PR PAIN SEVERITY QUANTIFIED, NO PAIN PRESENT: ICD-10-PCS | Mod: ,,, | Performed by: INTERNAL MEDICINE

## 2019-11-25 PROCEDURE — 1126F AMNT PAIN NOTED NONE PRSNT: CPT | Mod: ,,, | Performed by: INTERNAL MEDICINE

## 2019-11-25 PROCEDURE — 99999 PR PBB SHADOW E&M-EST. PATIENT-LVL III: CPT | Mod: PBBFAC,,, | Performed by: INTERNAL MEDICINE

## 2019-11-25 PROCEDURE — 1159F MED LIST DOCD IN RCRD: CPT | Mod: ,,, | Performed by: INTERNAL MEDICINE

## 2019-11-25 PROCEDURE — 99999 PR PBB SHADOW E&M-EST. PATIENT-LVL III: ICD-10-PCS | Mod: PBBFAC,,, | Performed by: INTERNAL MEDICINE

## 2019-11-25 PROCEDURE — 1159F PR MEDICATION LIST DOCUMENTED IN MEDICAL RECORD: ICD-10-PCS | Mod: ,,, | Performed by: INTERNAL MEDICINE

## 2019-11-25 NOTE — PROGRESS NOTES
"HPI:  Patient is 79-year-old man who comes today for follow-up of his diabetes, hypertension, lipids, AFib and for his annual physical exam.  He has been doing well.  He does continue to still work full-time at [employer].  He denies any chest pains, shortness breath, or palpitations.  His blood pressure at [place] tends to run about 130-100 40/80.  He denies any hypoglycemia.      Current MEDS: medcard review, verified and update  Allergies: Per the electronic medical record    Past Medical History:   Diagnosis Date    Anxiety     Atrial fibrillation     BPH with obstruction/lower urinary tract symptoms     Depression     Diabetes mellitus type II, controlled     Hyperlipidemia associated with type 2 diabetes mellitus     Hypertension associated with diabetes        Past Surgical History:   Procedure Laterality Date    CARDIOVERSION N/A 1/23/2019    Procedure: CARDIOVERSION;  Surgeon: Charbel Pandey MD;  Location: [facility] CATH LAB;  Service: Cardiology;  Laterality: N/A;    COLONOSCOPY N/A 5/1/2017    Procedure: COLONOSCOPY;  Surgeon: Vishal Andrade MD;  Location: [facility] ENDO;  Service: Endoscopy;  Laterality: N/A;    TONSILLECTOMY         SHx: per the electronic medical record    FHx: recorded in the electronic medical record    ROS:    denies any chest pains or shortness of breath. Denies any nausea, vomiting or diarrhea. Denies any fever, chills or sweats. Denies any change in weight, voice, stool, skin or hair. Denies any dysuria, dyspepsia or dysphagia. Denies any change in vision, hearing or headaches. Denies any swollen lymph nodes or loss of memory.    PE:  /82   Temp 97.8 °F (36.6 °C) (Tympanic)   Ht 5' 11" (1.803 m)   Wt 94.6 kg (208 lb 8.9 oz)   SpO2 98%   BMI 29.09 kg/m²   Gen: Well-developed, well-nourished, male, in no acute distress, oriented x3  HEENT: neck is supple, no adenopathy, carotids 2+ equal without bruits, thyroid exam normal size without nodules.  CHEST: clear to auscultation " and percussion  CVS: regular rate and rhythm without significant murmur, gallop, or rubs  ABD: soft, benign, no rebound no guarding, no distention.  Bowel sounds are normal.     nontender.  No palpable masses.  No organomegaly and no audible bruits.  RECTAL:  Deferred  EXT: no clubbing, cyanosis, or edema  LYMPH: no cervical, inguinal, or axillary adenopathy  FEET: no loss of sensation.  No ulcers or pressure sores.  Monofilament testing normal  NEURO: gait normal.  Cranial nerves II- XII intact. No nystagmus.  Speech normal.   Gross motor and sensory unremarkable.    Lab Results   Component Value Date    WBC 7.20 11/18/2019    HGB 14.6 11/18/2019    HCT 45.4 11/18/2019     11/18/2019    CHOL 142 11/18/2019    TRIG 54 11/18/2019    HDL 61 11/18/2019    ALT 16 11/18/2019    AST 21 11/18/2019     11/18/2019    K 4.5 11/18/2019     11/18/2019    CREATININE 1.1 11/18/2019    BUN 22 11/18/2019    CO2 28 11/18/2019    TSH 1.627 11/18/2019    PSA 2.9 10/10/2017    INR 1.0 01/22/2019    HGBA1C 5.6 11/18/2019       Impression:  Multiple medical problems below, stable  Patient Active Problem List   Diagnosis    Anxiety    Hyperlipidemia associated with type 2 diabetes mellitus    Hypertension associated with diabetes    Diabetes mellitus type II, controlled    Left inguinal hernia    History of colon polyps    BPH with obstruction/lower urinary tract symptoms    PAF (paroxysmal atrial fibrillation)    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  He will be seen again in 6 months with above lab work  This note is generated with speech recognition software and is subject to transcription error and sound alike phrases that may be missed by proofreading.

## 2019-12-10 ENCOUNTER — OFFICE VISIT (OUTPATIENT)
Dept: OPHTHALMOLOGY | Facility: CLINIC | Age: 79
End: 2019-12-10
Payer: MEDICARE

## 2019-12-10 DIAGNOSIS — E11.9 TYPE 2 DIABETES MELLITUS WITHOUT RETINOPATHY: Primary | ICD-10-CM

## 2019-12-10 DIAGNOSIS — Z13.5 GLAUCOMA SCREENING: ICD-10-CM

## 2019-12-10 DIAGNOSIS — H25.13 NUCLEAR SCLEROSIS OF BOTH EYES: ICD-10-CM

## 2019-12-10 PROCEDURE — 99212 OFFICE O/P EST SF 10 MIN: CPT | Mod: PBBFAC | Performed by: OPTOMETRIST

## 2019-12-10 PROCEDURE — 92014 PR EYE EXAM, EST PATIENT,COMPREHESV: ICD-10-PCS | Mod: S$PBB,,, | Performed by: OPTOMETRIST

## 2019-12-10 PROCEDURE — 92014 COMPRE OPH EXAM EST PT 1/>: CPT | Mod: S$PBB,,, | Performed by: OPTOMETRIST

## 2019-12-10 PROCEDURE — 99999 PR PBB SHADOW E&M-EST. PATIENT-LVL II: ICD-10-PCS | Mod: PBBFAC,,, | Performed by: OPTOMETRIST

## 2019-12-10 PROCEDURE — 99999 PR PBB SHADOW E&M-EST. PATIENT-LVL II: CPT | Mod: PBBFAC,,, | Performed by: OPTOMETRIST

## 2019-12-10 NOTE — PROGRESS NOTES
HPI     Last [facility] exam 12/12/2016  Diabetic/NIDDM  Lab Results       Component                Value               Date                       HGBA1C                   5.6                 11/18/2019     Screening for glaucoma   RE         Uses OTC Readers +2.50    Last edited by Sav Mesa MA on 12/10/2019  8:23 AM. (History)            Assessment /Plan     For exam results, see Encounter Report.    Type 2 diabetes mellitus without retinopathy    Glaucoma screening    Nuclear sclerosis of both eyes    No diabetic retinopathy in either eye.  Glaucoma screening negative in both eyes.  Cataract(s) not yet affecting activities of daily living and does not meet the vision requirements for referral   Surgery consult is not yet indicated.  Return to clinic 1 yr.    .

## 2019-12-29 RX ORDER — APIXABAN 5 MG/1
TABLET, FILM COATED ORAL
Qty: 90 TABLET | Refills: 3 | Status: SHIPPED | OUTPATIENT
Start: 2019-12-29 | End: 2020-06-18

## 2020-02-04 ENCOUNTER — OFFICE VISIT (OUTPATIENT)
Dept: CARDIOLOGY | Facility: CLINIC | Age: 80
End: 2020-02-04
Payer: MEDICARE

## 2020-02-04 VITALS
DIASTOLIC BLOOD PRESSURE: 62 MMHG | SYSTOLIC BLOOD PRESSURE: 116 MMHG | HEART RATE: 44 BPM | BODY MASS INDEX: 29.27 KG/M2 | WEIGHT: 209.88 LBS

## 2020-02-04 DIAGNOSIS — E11.9 CONTROLLED TYPE 2 DIABETES MELLITUS WITHOUT COMPLICATION, WITHOUT LONG-TERM CURRENT USE OF INSULIN: ICD-10-CM

## 2020-02-04 DIAGNOSIS — I48.0 PAF (PAROXYSMAL ATRIAL FIBRILLATION): Primary | ICD-10-CM

## 2020-02-04 DIAGNOSIS — E11.59 HYPERTENSION ASSOCIATED WITH DIABETES: ICD-10-CM

## 2020-02-04 DIAGNOSIS — I15.2 HYPERTENSION ASSOCIATED WITH DIABETES: ICD-10-CM

## 2020-02-04 DIAGNOSIS — E11.69 HYPERLIPIDEMIA ASSOCIATED WITH TYPE 2 DIABETES MELLITUS: ICD-10-CM

## 2020-02-04 DIAGNOSIS — E78.5 HYPERLIPIDEMIA ASSOCIATED WITH TYPE 2 DIABETES MELLITUS: ICD-10-CM

## 2020-02-04 PROCEDURE — 99999 PR PBB SHADOW E&M-EST. PATIENT-LVL III: ICD-10-PCS | Mod: PBBFAC,,, | Performed by: INTERNAL MEDICINE

## 2020-02-04 PROCEDURE — 99214 PR OFFICE/OUTPT VISIT, EST, LEVL IV, 30-39 MIN: ICD-10-PCS | Mod: S$PBB,,, | Performed by: INTERNAL MEDICINE

## 2020-02-04 PROCEDURE — 99214 OFFICE O/P EST MOD 30 MIN: CPT | Mod: S$PBB,,, | Performed by: INTERNAL MEDICINE

## 2020-02-04 PROCEDURE — 99999 PR PBB SHADOW E&M-EST. PATIENT-LVL III: CPT | Mod: PBBFAC,,, | Performed by: INTERNAL MEDICINE

## 2020-02-04 PROCEDURE — 99213 OFFICE O/P EST LOW 20 MIN: CPT | Mod: PBBFAC | Performed by: INTERNAL MEDICINE

## 2020-02-04 NOTE — PROGRESS NOTES
Subjective:   Patient ID:  Bi Jessica is a 79 y.o. male who presents for cardiac consult of Follow-up      HPI  The patient came in today for cardiac consult of Follow-up      Bi Jessica is a 79 y.o. male  with PAF on Eliquis, HTN, HLP, DM here for follow up CV eval.     2/7/19  He presented to to [facility] ED due to recent syncopal episode overnight on 1/22/19. Patient reports getting up last PM/early AM and feeling weak and having syncopal episode afterward. Initial EKG revealed new onset AFIB, rate 89. Serial troponin normal. Cardiac echo showed normal LVEF, diastolic dysfunction. Carotid U/S showed No evidence of hemodynamically significant stenosis. BP low normal- no significant orthostasis. Pt was placed on Lopressor and IV heparin. GANESH done which showed OSCAR thrombus. He was started on Eliquis 5mg BID, Lopressor 25mg BID, told will repeat GANESH in 4 weeks.Today he is in NSR. Discussed does not need GANESH/DCCV. He can't sleep well, wants to take melatonin and occ Tylenol PM. Wants to go back to work at [employer], discussed it is fine as long as he doesn't over exert himself.     5/14/19  Holter revealed bradycardia AVG HR 49, no AFib. He has some dental pain, maybe infection or abscess. No fevers/chills. He feels drowsy/sluggish, discussed will stop BB. Pt seems more forgetful, unsure dementia due to TIA/vascular dementia vs early Alzeihmers, daughter agrees for neuro eval as well.     2/4/20  NSVT noted on Zio after last visit last year, referred to EP - no indication for PPM. He has been working at [employer], has been taking Trazadone. Overall good energy, HR remains slow.     Patient feels no chest pain, no sob, no leg swelling, no PND, no palpitation, no dizziness, no syncope, no CNS symptoms.    Patient has fairly good exercise tolerance.    Patient is compliant with medications.    ECG - sinus purvi V rate 54       CONCLUSIONS   Patient had a min HR of 31 bpm, max HR of 152 bpm, and avg HR of 51  bpm.  Predominant underlying rhythm was Sinus Rhythm. First Degree AV Block was  present. 2 Ventricular Tachycardia runs occurred, the run with the fastest interval  lasting 4 beats with a max rate of 152 bpm, the longest lasting 6 beats with an avg  rate of 126 bpm. 63 Supraventricular Tachycardia runs occurred, the run with the  fastest interval lasting 4 beats with a max rate of 146 bpm, the longest lasting 20  beats with an avg rate of 100 bpm. Some episodes of Supraventricular Tachycardia  may be possible Atrial Tachycardia with variable block. Junctional Rhythm was  present. Junctional Rhythm was detected within +/- 45 seconds of symptomatic  patient event(s). Isolated SVEs were occasional (1.9%, 36755), SVE Couplets were  rare (<1.0%, 514), and SVE Triplets were rare (<1.0%, 41). Isolated VEs were rare  (<1.0%, 2084), VE Couplets were rare (<1.0%, 15), and VE Triplets were rare  (<1.0%, 1). Ventricular Bigeminy was present.      This document has been electronically    SIGNED BY: Charbel Pandey MD On: 07/24/2019 17:09    2D ECHO  01/22/2019  CONCLUSIONS     1 - Normal left ventricular systolic function (EF 55-60%).     2 - Indeterminate LV diastolic function.     3 - Normal right ventricular systolic function .     4 - No wall motion abnormalities.     5 - Concentric remodeling.     6 - The estimated PA systolic pressure is 39 mmHg.     7 - Mild tricuspid regurgitation.       HOLTER 3/12/19  TEST DESCRIPTION   PREDOMINANT RHYTHM  1. Sinus rhythm with heart rates varying between 35 and 103 bpm with an average of 49 bpm.     VENTRICULAR ARRHYTHMIAS  1. There were rare PVCs totalling 160 and averaging 3 per hour.   The ventricular arrhythmias percentage was .1.    2. There were no episodes of ventricular tachycardia.    SUPRA VENTRICULAR ARRHYTHMIAS  1. There was no supraventricular ectopic activity.  SINUS NODE FUNCTION  1. There was no evidence of high grade SA chacorta block.   AV CONDUCTION  1. There was no  evidence of high grade AV block.     Past Medical History:   Diagnosis Date    Anxiety     Atrial fibrillation     BPH with obstruction/lower urinary tract symptoms     Depression     Diabetes mellitus type II, controlled     Hyperlipidemia associated with type 2 diabetes mellitus     Hypertension associated with diabetes        Past Surgical History:   Procedure Laterality Date    CARDIOVERSION N/A 1/23/2019    Procedure: CARDIOVERSION;  Surgeon: Charbel Pandey MD;  Location: [facility] CATH LAB;  Service: Cardiology;  Laterality: N/A;    COLONOSCOPY N/A 5/1/2017    Procedure: COLONOSCOPY;  Surgeon: Vishal Andrade MD;  Location: [facility] ENDO;  Service: Endoscopy;  Laterality: N/A;    TONSILLECTOMY         Social History     Tobacco Use    Smoking status: Former Smoker    Smokeless tobacco: Never Used   Substance Use Topics    Alcohol use: Not Currently     Alcohol/week: 0.0 standard drinks     Comment: occasionally     Drug use: No       Family History   Problem Relation Age of Onset    Heart disease Mother     Heart disease Father     Heart disease Brother     Cancer Brother        Patient's Medications   New Prescriptions    No medications on file   Previous Medications    ASPIRIN (ECOTRIN) 81 MG EC TABLET    Take 81 mg by mouth once daily.    CALCIUM-VITAMIN D3 500 MG(1,250MG) -200 UNIT PER TABLET    Take 1 tablet by mouth every evening.    CYANOCOBALAMIN (VITAMIN B-12) 1000 MCG TABLET    Take 100 mcg by mouth once daily.    ELIQUIS 5 MG TAB    TAKE 1 TABLET BY MOUTH TWICE DAILY    FISH OIL-OMEGA-3 FATTY ACIDS 300-1,000 MG CAPSULE    Take 2 g by mouth once daily.    FLAXSEED OIL ORAL    Take by mouth.    MAGNESIUM 30 MG TAB    Take by mouth once.    METFORMIN (GLUCOPHAGE) 850 MG TABLET    TAKE 1 TABLET BY MOUTH ONCE DAILY    POTASSIUM 99 MG TAB    Take by mouth once.    SILDENAFIL (REVATIO) 20 MG TAB    Take 1-4 pills one hour prior to intercourse    SIMVASTATIN (ZOCOR) 40 MG TABLET    TAKE 1 TABLET BY  MOUTH ONCE DAILY    TRAZODONE (DESYREL) 50 MG TABLET    Take 1 tablet (50 mg total) by mouth every evening.    VITAMIN E 1000 UNIT CAPSULE    Take 1,000 Units by mouth once daily.    ZINC GLUCONATE 50 MG TABLET    Take 50 mg by mouth once daily.   Modified Medications    No medications on file   Discontinued Medications    No medications on file       Review of Systems   Constitutional: Positive for malaise/fatigue.   HENT: Negative.    Eyes: Negative.    Respiratory: Negative.    Cardiovascular: Negative.    Gastrointestinal: Negative.    Genitourinary: Negative.    Musculoskeletal: Negative.    Skin: Negative.    Neurological: Negative.    Endo/Heme/Allergies: Negative.    Psychiatric/Behavioral: Negative.    All 12 systems otherwise negative.      Wt Readings from Last 3 Encounters:   02/04/20 95.2 kg (209 lb 14.1 oz)   11/25/19 94.6 kg (208 lb 8.9 oz)   10/21/19 94.4 kg (208 lb 1.8 oz)     Temp Readings from Last 3 Encounters:   11/25/19 97.8 °F (36.6 °C) (Tympanic)   10/21/19 97.6 °F (36.4 °C) (Tympanic)   05/27/19 98.4 °F (36.9 °C)     BP Readings from Last 3 Encounters:   02/04/20 116/62   11/25/19 138/82   10/21/19 136/80     Pulse Readings from Last 3 Encounters:   02/04/20 (!) 44   10/21/19 (!) 42   08/14/19 (!) 49       /62 (BP Location: Left arm, Patient Position: Sitting, BP Method: Medium (Manual))   Pulse (!) 44   Wt 95.2 kg (209 lb 14.1 oz)   BMI 29.27 kg/m²     Objective:   Physical Exam   Constitutional: He is oriented to person, place, and time. He appears well-developed and well-nourished. No distress.   HENT:   Head: Normocephalic and atraumatic.   Nose: Nose normal.   Mouth/Throat: Oropharynx is clear and moist.   Eyes: Conjunctivae and EOM are normal. No scleral icterus.   Neck: Normal range of motion. Neck supple. No JVD present. No thyromegaly present.   Cardiovascular: Regular rhythm, S1 normal and S2 normal. Bradycardia present. Exam reveals no gallop, no S3, no S4 and no friction  rub.   No murmur heard.  Pulmonary/Chest: Effort normal and breath sounds normal. No stridor. No respiratory distress. He has no wheezes. He has no rales. He exhibits no tenderness.   Abdominal: Soft. Bowel sounds are normal. He exhibits no distension and no mass. There is no tenderness. There is no rebound.   Genitourinary:   Genitourinary Comments: Deferred   Musculoskeletal: Normal range of motion. He exhibits no edema, tenderness or deformity.   Lymphadenopathy:     He has no cervical adenopathy.   Neurological: He is alert and oriented to person, place, and time. He exhibits normal muscle tone. Coordination normal.   Skin: Skin is warm and dry. No rash noted. He is not diaphoretic. No erythema. No pallor.   Psychiatric: He has a normal mood and affect. His behavior is normal. Judgment and thought content normal.   Nursing note and vitals reviewed.      Lab Results   Component Value Date     11/18/2019    K 4.5 11/18/2019     11/18/2019    CO2 28 11/18/2019    BUN 22 11/18/2019    CREATININE 1.1 11/18/2019     11/18/2019    HGBA1C 5.6 11/18/2019    AST 21 11/18/2019    ALT 16 11/18/2019    ALBUMIN 3.7 11/18/2019    PROT 7.0 11/18/2019    BILITOT 0.8 11/18/2019    WBC 7.20 11/18/2019    HGB 14.6 11/18/2019    HCT 45.4 11/18/2019    MCV 97 11/18/2019     11/18/2019    INR 1.0 01/22/2019    TSH 1.627 11/18/2019    CHOL 142 11/18/2019    HDL 61 11/18/2019    LDLCALC 70.2 11/18/2019    TRIG 54 11/18/2019     Assessment:      1. PAF (paroxysmal atrial fibrillation)    2. Hypertension associated with diabetes    3. Hyperlipidemia associated with type 2 diabetes mellitus    4. Controlled type 2 diabetes mellitus without complication, without long-term current use of insulin        Plan:   1. PAF with OSCAR thrombus with slow HR  - cont Eliquis  - stop BB due to bradycardia  - Zio patch - AVG HR 51, no PPM indication per EP    2. HTN  - stable, not on BB anymore    3. HLD  - cont statin    4.  DM2  - cont meds per PCP    5. Dementia eval  - refer to Neuro    6. Tooth pain  - f/u with dentist for abx/tx  - Hold Eliquis 2 days prior to any procedure needed and resume post     Thank you for allowing me to participate in this patient's care. Please do not hesitate to contact me with any questions or concerns. Consult note has been forwarded to the referral physician.

## 2020-02-10 ENCOUNTER — TELEPHONE (OUTPATIENT)
Dept: INTERNAL MEDICINE | Facility: CLINIC | Age: 80
End: 2020-02-10

## 2020-02-10 RX ORDER — TRAZODONE HYDROCHLORIDE 100 MG/1
100 TABLET ORAL NIGHTLY
Qty: 30 TABLET | Refills: 11 | Status: SHIPPED | OUTPATIENT
Start: 2020-02-10 | End: 2020-12-20

## 2020-02-10 NOTE — TELEPHONE ENCOUNTER
Patient states the trazodone is not helping him sleep. Also taking melatonin with the trazodone. He also said he's drowsy during the day.

## 2020-02-10 NOTE — TELEPHONE ENCOUNTER
----- Message from Charles Barksdale sent at 2/10/2020  8:44 AM CST -----  Contact: pt  Pt is calling the staff regarding the pt is not sleeping at night  even with the medication    Pt call back to be advised [phone]    Thanks

## 2020-03-17 ENCOUNTER — TELEPHONE (OUTPATIENT)
Dept: INTERNAL MEDICINE | Facility: CLINIC | Age: 80
End: 2020-03-17

## 2020-03-17 NOTE — TELEPHONE ENCOUNTER
Patient said the Trazodone 100mg is helping him. But when he wakes up he still feels like he his sleepy. He has to fight to get out of bed. As if his no all the way awake and then he's tired all day. He did say it does take him a while to fall asleep.

## 2020-03-17 NOTE — TELEPHONE ENCOUNTER
----- Message from Murali Patel sent at 3/17/2020  9:16 AM CDT -----  Contact: pt   .Type:  Patient Returning Call    Who Called: pt   Who Left Message for Patient: nurse   Does the patient know what this is regarding?: yes   Would the patient rather a call back or a response via [facility]? Callback   Best Call Back Number:    [phone]     Additional Information:  Pt having some sleeping issue that was discussing with nurse.

## 2020-03-17 NOTE — TELEPHONE ENCOUNTER
I would not change his medication. I encourage him to stay active during the day. No day time napping at all. Needs to exercise daily. He can also take Tylenol PM at bedtime.

## 2020-05-18 ENCOUNTER — LAB VISIT (OUTPATIENT)
Dept: LAB | Facility: HOSPITAL | Age: 80
End: 2020-05-18
Attending: INTERNAL MEDICINE
Payer: MEDICARE

## 2020-05-18 DIAGNOSIS — E11.9 CONTROLLED TYPE 2 DIABETES MELLITUS WITHOUT COMPLICATION, WITHOUT LONG-TERM CURRENT USE OF INSULIN: ICD-10-CM

## 2020-05-18 LAB
BASOPHILS # BLD AUTO: 0.07 K/UL (ref 0–0.2)
BASOPHILS NFR BLD: 1.1 % (ref 0–1.9)
DIFFERENTIAL METHOD: ABNORMAL
EOSINOPHIL # BLD AUTO: 0.3 K/UL (ref 0–0.5)
EOSINOPHIL NFR BLD: 4 % (ref 0–8)
ERYTHROCYTE [DISTWIDTH] IN BLOOD BY AUTOMATED COUNT: 12.9 % (ref 11.5–14.5)
HCT VFR BLD AUTO: 46.2 % (ref 40–54)
HGB BLD-MCNC: 14.4 G/DL (ref 14–18)
IMM GRANULOCYTES # BLD AUTO: 0.02 K/UL (ref 0–0.04)
IMM GRANULOCYTES NFR BLD AUTO: 0.3 % (ref 0–0.5)
LYMPHOCYTES # BLD AUTO: 1.6 K/UL (ref 1–4.8)
LYMPHOCYTES NFR BLD: 25.3 % (ref 18–48)
MCH RBC QN AUTO: 31.1 PG (ref 27–31)
MCHC RBC AUTO-ENTMCNC: 31.2 G/DL (ref 32–36)
MCV RBC AUTO: 100 FL (ref 82–98)
MONOCYTES # BLD AUTO: 0.6 K/UL (ref 0.3–1)
MONOCYTES NFR BLD: 9.6 % (ref 4–15)
NEUTROPHILS # BLD AUTO: 3.8 K/UL (ref 1.8–7.7)
NEUTROPHILS NFR BLD: 59.7 % (ref 38–73)
NRBC BLD-RTO: 0 /100 WBC
PLATELET # BLD AUTO: 169 K/UL (ref 150–350)
PMV BLD AUTO: 10.8 FL (ref 9.2–12.9)
RBC # BLD AUTO: 4.63 M/UL (ref 4.6–6.2)
WBC # BLD AUTO: 6.44 K/UL (ref 3.9–12.7)

## 2020-05-18 PROCEDURE — 80061 LIPID PANEL: CPT

## 2020-05-18 PROCEDURE — 84443 ASSAY THYROID STIM HORMONE: CPT

## 2020-05-18 PROCEDURE — 36415 COLL VENOUS BLD VENIPUNCTURE: CPT | Mod: PO

## 2020-05-18 PROCEDURE — 83036 HEMOGLOBIN GLYCOSYLATED A1C: CPT

## 2020-05-18 PROCEDURE — 80053 COMPREHEN METABOLIC PANEL: CPT

## 2020-05-18 PROCEDURE — 85025 COMPLETE CBC W/AUTO DIFF WBC: CPT

## 2020-05-19 LAB
ALBUMIN SERPL BCP-MCNC: 3.9 G/DL (ref 3.5–5.2)
ALP SERPL-CCNC: 55 U/L (ref 55–135)
ALT SERPL W/O P-5'-P-CCNC: 16 U/L (ref 10–44)
ANION GAP SERPL CALC-SCNC: 9 MMOL/L (ref 8–16)
AST SERPL-CCNC: 21 U/L (ref 10–40)
BILIRUB SERPL-MCNC: 1.1 MG/DL (ref 0.1–1)
BUN SERPL-MCNC: 22 MG/DL (ref 8–23)
CALCIUM SERPL-MCNC: 9.8 MG/DL (ref 8.7–10.5)
CHLORIDE SERPL-SCNC: 107 MMOL/L (ref 95–110)
CHOLEST SERPL-MCNC: 147 MG/DL (ref 120–199)
CHOLEST/HDLC SERPL: 2.4 {RATIO} (ref 2–5)
CO2 SERPL-SCNC: 25 MMOL/L (ref 23–29)
CREAT SERPL-MCNC: 1.1 MG/DL (ref 0.5–1.4)
EST. GFR  (AFRICAN AMERICAN): >60 ML/MIN/1.73 M^2
EST. GFR  (NON AFRICAN AMERICAN): >60 ML/MIN/1.73 M^2
ESTIMATED AVG GLUCOSE: 117 MG/DL (ref 68–131)
GLUCOSE SERPL-MCNC: 101 MG/DL (ref 70–110)
HBA1C MFR BLD HPLC: 5.7 % (ref 4–5.6)
HDLC SERPL-MCNC: 61 MG/DL (ref 40–75)
HDLC SERPL: 41.5 % (ref 20–50)
LDLC SERPL CALC-MCNC: 73.4 MG/DL (ref 63–159)
NONHDLC SERPL-MCNC: 86 MG/DL
POTASSIUM SERPL-SCNC: 4.6 MMOL/L (ref 3.5–5.1)
PROT SERPL-MCNC: 7.4 G/DL (ref 6–8.4)
SODIUM SERPL-SCNC: 141 MMOL/L (ref 136–145)
TRIGL SERPL-MCNC: 63 MG/DL (ref 30–150)
TSH SERPL DL<=0.005 MIU/L-ACNC: 1.33 UIU/ML (ref 0.4–4)

## 2020-05-25 ENCOUNTER — OFFICE VISIT (OUTPATIENT)
Dept: INTERNAL MEDICINE | Facility: CLINIC | Age: 80
End: 2020-05-25
Payer: MEDICARE

## 2020-05-25 VITALS
DIASTOLIC BLOOD PRESSURE: 86 MMHG | HEART RATE: 51 BPM | WEIGHT: 205.25 LBS | SYSTOLIC BLOOD PRESSURE: 138 MMHG | HEIGHT: 71 IN | BODY MASS INDEX: 28.73 KG/M2 | OXYGEN SATURATION: 98 %

## 2020-05-25 DIAGNOSIS — E78.5 HYPERLIPIDEMIA ASSOCIATED WITH TYPE 2 DIABETES MELLITUS: ICD-10-CM

## 2020-05-25 DIAGNOSIS — I70.0 AORTIC ATHEROSCLEROSIS: ICD-10-CM

## 2020-05-25 DIAGNOSIS — F41.9 ANXIETY: ICD-10-CM

## 2020-05-25 DIAGNOSIS — E11.59 HYPERTENSION ASSOCIATED WITH DIABETES: ICD-10-CM

## 2020-05-25 DIAGNOSIS — F32.5 MAJOR DEPRESSIVE DISORDER IN FULL REMISSION, UNSPECIFIED WHETHER RECURRENT: ICD-10-CM

## 2020-05-25 DIAGNOSIS — I48.0 PAF (PAROXYSMAL ATRIAL FIBRILLATION): ICD-10-CM

## 2020-05-25 DIAGNOSIS — E11.9 CONTROLLED TYPE 2 DIABETES MELLITUS WITHOUT COMPLICATION, WITHOUT LONG-TERM CURRENT USE OF INSULIN: Primary | ICD-10-CM

## 2020-05-25 DIAGNOSIS — I15.2 HYPERTENSION ASSOCIATED WITH DIABETES: ICD-10-CM

## 2020-05-25 DIAGNOSIS — E11.69 HYPERLIPIDEMIA ASSOCIATED WITH TYPE 2 DIABETES MELLITUS: ICD-10-CM

## 2020-05-25 DIAGNOSIS — N40.1 BPH WITH OBSTRUCTION/LOWER URINARY TRACT SYMPTOMS: ICD-10-CM

## 2020-05-25 DIAGNOSIS — N13.8 BPH WITH OBSTRUCTION/LOWER URINARY TRACT SYMPTOMS: ICD-10-CM

## 2020-05-25 PROCEDURE — 99213 OFFICE O/P EST LOW 20 MIN: CPT | Mod: PBBFAC,PO | Performed by: INTERNAL MEDICINE

## 2020-05-25 PROCEDURE — 99213 PR OFFICE/OUTPT VISIT, EST, LEVL III, 20-29 MIN: ICD-10-PCS | Mod: S$PBB,,, | Performed by: INTERNAL MEDICINE

## 2020-05-25 PROCEDURE — 99999 PR PBB SHADOW E&M-EST. PATIENT-LVL III: CPT | Mod: PBBFAC,,, | Performed by: INTERNAL MEDICINE

## 2020-05-25 PROCEDURE — 99999 PR PBB SHADOW E&M-EST. PATIENT-LVL III: ICD-10-PCS | Mod: PBBFAC,,, | Performed by: INTERNAL MEDICINE

## 2020-05-25 PROCEDURE — 99213 OFFICE O/P EST LOW 20 MIN: CPT | Mod: S$PBB,,, | Performed by: INTERNAL MEDICINE

## 2020-05-25 RX ORDER — METFORMIN HYDROCHLORIDE 850 MG/1
850 TABLET ORAL DAILY
Qty: 90 TABLET | Refills: 3 | Status: SHIPPED | OUTPATIENT
Start: 2020-05-25 | End: 2021-06-23

## 2020-05-25 RX ORDER — SIMVASTATIN 40 MG/1
40 TABLET, FILM COATED ORAL DAILY
Qty: 90 TABLET | Refills: 3 | Status: SHIPPED | OUTPATIENT
Start: 2020-05-25 | End: 2021-06-23

## 2020-05-25 NOTE — PROGRESS NOTES
"HPI:  Patient is an 80-year-old man who comes today for follow-up of his diabetes, hypertension and lipids.  He is doing very well.  He has no complaints.  He denies any hypoglycemia.  His blood pressures been in the 130-140/80 range.    Current meds have been verified and updated per the EMR  Exam:/86   Pulse (!) 51   Ht 5' 11" (1.803 m)   Wt 93.1 kg (205 lb 4 oz)   SpO2 98%   BMI 28.63 kg/m²   Exam deferred    Lab Results   Component Value Date    WBC 6.44 05/18/2020    HGB 14.4 05/18/2020    HCT 46.2 05/18/2020     05/18/2020    CHOL 147 05/18/2020    TRIG 63 05/18/2020    HDL 61 05/18/2020    ALT 16 05/18/2020    AST 21 05/18/2020     05/18/2020    K 4.6 05/18/2020     05/18/2020    CREATININE 1.1 05/18/2020    BUN 22 05/18/2020    CO2 25 05/18/2020    TSH 1.328 05/18/2020    PSA 2.9 10/10/2017    INR 1.0 01/22/2019    HGBA1C 5.7 (H) 05/18/2020       Impression:  Multiple medical problems below, stable  Patient Active Problem List   Diagnosis    Anxiety    Hyperlipidemia associated with type 2 diabetes mellitus    Hypertension associated with diabetes    Diabetes mellitus type II, controlled    Left inguinal hernia    History of colon polyps    BPH with obstruction/lower urinary tract symptoms    PAF (paroxysmal atrial fibrillation)    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  He will be seen again in 6 months with above lab work    This note is generated with speech recognition software and is subject to transcription error and sound alike phrases that may be missed by proofreading.    "

## 2020-09-23 ENCOUNTER — OFFICE VISIT (OUTPATIENT)
Dept: INTERNAL MEDICINE | Facility: CLINIC | Age: 80
End: 2020-09-23
Payer: MEDICARE

## 2020-09-23 VITALS
WEIGHT: 196.88 LBS | HEIGHT: 69 IN | OXYGEN SATURATION: 95 % | SYSTOLIC BLOOD PRESSURE: 140 MMHG | RESPIRATION RATE: 20 BRPM | DIASTOLIC BLOOD PRESSURE: 82 MMHG | BODY MASS INDEX: 29.16 KG/M2 | HEART RATE: 60 BPM | TEMPERATURE: 98 F

## 2020-09-23 DIAGNOSIS — N13.8 BPH WITH OBSTRUCTION/LOWER URINARY TRACT SYMPTOMS: ICD-10-CM

## 2020-09-23 DIAGNOSIS — I70.0 AORTIC ATHEROSCLEROSIS: ICD-10-CM

## 2020-09-23 DIAGNOSIS — E78.5 HYPERLIPIDEMIA ASSOCIATED WITH TYPE 2 DIABETES MELLITUS: ICD-10-CM

## 2020-09-23 DIAGNOSIS — F41.9 ANXIETY: ICD-10-CM

## 2020-09-23 DIAGNOSIS — I15.2 HYPERTENSION ASSOCIATED WITH DIABETES: ICD-10-CM

## 2020-09-23 DIAGNOSIS — E11.69 HYPERLIPIDEMIA ASSOCIATED WITH TYPE 2 DIABETES MELLITUS: ICD-10-CM

## 2020-09-23 DIAGNOSIS — I48.0 PAF (PAROXYSMAL ATRIAL FIBRILLATION): ICD-10-CM

## 2020-09-23 DIAGNOSIS — N40.1 BPH WITH OBSTRUCTION/LOWER URINARY TRACT SYMPTOMS: ICD-10-CM

## 2020-09-23 DIAGNOSIS — Z00.00 ENCOUNTER FOR PREVENTIVE HEALTH EXAMINATION: Primary | ICD-10-CM

## 2020-09-23 DIAGNOSIS — E11.59 HYPERTENSION ASSOCIATED WITH DIABETES: ICD-10-CM

## 2020-09-23 DIAGNOSIS — Z01.00 ROUTINE EYE EXAM: ICD-10-CM

## 2020-09-23 DIAGNOSIS — F32.5 MAJOR DEPRESSIVE DISORDER IN FULL REMISSION, UNSPECIFIED WHETHER RECURRENT: ICD-10-CM

## 2020-09-23 PROCEDURE — 99215 OFFICE O/P EST HI 40 MIN: CPT | Mod: PBBFAC,PO | Performed by: NURSE PRACTITIONER

## 2020-09-23 PROCEDURE — G0439 PPPS, SUBSEQ VISIT: HCPCS | Mod: S$GLB,,, | Performed by: NURSE PRACTITIONER

## 2020-09-23 PROCEDURE — G0439 PR MEDICARE ANNUAL WELLNESS SUBSEQUENT VISIT: ICD-10-PCS | Mod: S$GLB,,, | Performed by: NURSE PRACTITIONER

## 2020-09-23 PROCEDURE — 99999 PR PBB SHADOW E&M-EST. PATIENT-LVL V: CPT | Mod: PBBFAC,,, | Performed by: NURSE PRACTITIONER

## 2020-09-23 PROCEDURE — 99999 PR PBB SHADOW E&M-EST. PATIENT-LVL V: ICD-10-PCS | Mod: PBBFAC,,, | Performed by: NURSE PRACTITIONER

## 2020-09-23 NOTE — PATIENT INSTRUCTIONS
Counseling and Referral of Other Preventative  (Italic type indicates deductible and co-insurance are waived)    Patient Name: Bi Jessica  Today's Date: 9/23/2020    Health Maintenance       Date Due Completion Date    Influenza Vaccine (1) 08/01/2020 10/21/2019    Urine Microalbumin 11/18/2020 11/18/2019    Hemoglobin A1c 11/18/2020 5/18/2020    Foot Exam 11/25/2020 11/25/2019 (Done)    Override on 11/25/2019: Done    Override on 5/20/2019: Done    Override on 7/9/2015: Done    Eye Exam 12/10/2020 12/10/2019    Lipid Panel 05/18/2021 5/18/2020    Colonoscopy 05/01/2022 5/1/2017    TETANUS VACCINE 01/22/2029 1/22/2019        Orders Placed This Encounter   Procedures    Ambulatory referral/consult to Optometry     The following information is provided to all patients.  This information is to help you find resources for any of the problems found today that may be affecting your health:                Living healthy guide: www.[facility].[place].gov      Understanding Diabetes: www.diabetes.org      Eating healthy: www.cdc.gov/healthyweight      CDC home safety checklist: www.cdc.gov/steadi/patient.html      Agency on Aging: www.goea.[place].gov      Alcoholics anonymous (AA): www.aa.org      Physical Activity: www.[place].nih.gov/fg0ehdc      Tobacco use: www.quitwithusla.org

## 2020-09-23 NOTE — PROGRESS NOTES
"  Bi Jessica presented for a  Medicare AWV and comprehensive Health Risk Assessment today. The following components were reviewed and updated:    · Medical history  · Family History  · Social history  · Allergies and Current Medications  · Health Risk Assessment  · Health Maintenance  · Care Team     ** See Completed Assessments for Annual Wellness Visit within the encounter summary.**         The following assessments were completed:  · Living Situation  · CAGE  · Depression Screening  · Timed Get Up and Go  · Whisper Test  · Cognitive Function Screening  · Nutrition Screening  · ADL Screening  · PAQ Screening        Vitals:    09/23/20 1327   BP: (!) 140/82   Pulse: 60   Resp: 20   Temp: 98 °F (36.7 °C)   TempSrc: Tympanic   SpO2: 95%   Weight: 89.3 kg (196 lb 13.9 oz)   Height: 5' 9" (1.753 m)     Body mass index is 29.07 kg/m².  Physical Exam  Constitutional:       General: He is not in acute distress.     Appearance: Normal appearance. He is well-developed. He is not toxic-appearing or diaphoretic.   HENT:      Head: Normocephalic and atraumatic.      Right Ear: Tympanic membrane normal. There is no impacted cerumen.      Left Ear: Tympanic membrane normal. There is no impacted cerumen.      Nose: Nose normal.      Mouth/Throat:      Mouth: Mucous membranes are moist.   Eyes:      Conjunctiva/sclera: Conjunctivae normal.   Neck:      Musculoskeletal: Normal range of motion and neck supple. No neck rigidity or muscular tenderness.      Vascular: No carotid bruit.   Cardiovascular:      Rate and Rhythm: Normal rate and regular rhythm.      Pulses: Normal pulses.      Heart sounds: Normal heart sounds. No murmur. No friction rub. No gallop.    Pulmonary:      Effort: Pulmonary effort is normal. No respiratory distress.      Breath sounds: Normal breath sounds. No wheezing or rales.   Chest:      Chest wall: No tenderness.   Abdominal:      General: Bowel sounds are normal. There is no distension.      " Palpations: Abdomen is soft. There is no mass.      Tenderness: There is no abdominal tenderness.      Hernia: No hernia is present.   Musculoskeletal: Normal range of motion.         General: No tenderness.   Lymphadenopathy:      Cervical: No cervical adenopathy.   Skin:     General: Skin is warm and dry.   Neurological:      General: No focal deficit present.      Mental Status: He is alert and oriented to person, place, and time.      Cranial Nerves: No cranial nerve deficit.   Psychiatric:         Mood and Affect: Mood normal.         Behavior: Behavior normal.               Diagnoses and health risks identified today and associated recommendations/orders:    1. Routine eye exam  - Ambulatory referral/consult to Optometry; Future    2. Encounter for preventive health examination  Screenings performed, as noted above.  Personal preventative testing needs reviewed.     3. Major depressive disorder in full remission, unspecified whether recurrent  Monitored/treated on meds, continue the same tx, stable, no SI/HI    4. Anxiety  Monitored/treated on meds, continue the same tx, stable, continues to work at [employer] 3-4 days a week    5. PAF (paroxysmal atrial fibrillation)  Monitored/treated on meds, continue the same tx, stable    6. Hypertension associated with diabetes  Monitored/treated on meds, continue the same tx, stable    7. Hyperlipidemia associated with type 2 diabetes mellitus  Monitored/treated on meds, continue the same tx, stable    8. Aortic atherosclerosis  Monitored/treated on meds, continue the same tx, stable, cardiac diet, stay active, monitor BP    9. BPH with obstruction/lower urinary tract symptoms  Monitored/treated on meds, continue the same tx, stable      Provided Bi with a 5-10 year written screening schedule and personal prevention plan. Recommendations were developed using the USPSTF age appropriate recommendations. Education, counseling, and referrals were provided as needed. After  Visit Summary printed and given to patient which includes a list of additional screenings\tests needed.    No follow-ups on file.    Dallin Jacome NP

## 2020-11-03 ENCOUNTER — OFFICE VISIT (OUTPATIENT)
Dept: INTERNAL MEDICINE | Facility: CLINIC | Age: 80
End: 2020-11-03
Payer: MEDICARE

## 2020-11-03 ENCOUNTER — LAB VISIT (OUTPATIENT)
Dept: LAB | Facility: HOSPITAL | Age: 80
End: 2020-11-03
Attending: INTERNAL MEDICINE
Payer: MEDICARE

## 2020-11-03 VITALS
DIASTOLIC BLOOD PRESSURE: 74 MMHG | SYSTOLIC BLOOD PRESSURE: 120 MMHG | BODY MASS INDEX: 29.59 KG/M2 | HEART RATE: 59 BPM | WEIGHT: 200.38 LBS | TEMPERATURE: 98 F | OXYGEN SATURATION: 98 %

## 2020-11-03 DIAGNOSIS — E11.9 CONTROLLED TYPE 2 DIABETES MELLITUS WITHOUT COMPLICATION, WITHOUT LONG-TERM CURRENT USE OF INSULIN: ICD-10-CM

## 2020-11-03 DIAGNOSIS — I48.0 PAF (PAROXYSMAL ATRIAL FIBRILLATION): ICD-10-CM

## 2020-11-03 DIAGNOSIS — F41.9 ANXIETY: ICD-10-CM

## 2020-11-03 DIAGNOSIS — E78.5 HYPERLIPIDEMIA ASSOCIATED WITH TYPE 2 DIABETES MELLITUS: ICD-10-CM

## 2020-11-03 DIAGNOSIS — E11.59 HYPERTENSION ASSOCIATED WITH DIABETES: ICD-10-CM

## 2020-11-03 DIAGNOSIS — E11.9 CONTROLLED TYPE 2 DIABETES MELLITUS WITHOUT COMPLICATION, WITHOUT LONG-TERM CURRENT USE OF INSULIN: Primary | ICD-10-CM

## 2020-11-03 DIAGNOSIS — I15.2 HYPERTENSION ASSOCIATED WITH DIABETES: ICD-10-CM

## 2020-11-03 DIAGNOSIS — Z86.010 HISTORY OF COLON POLYPS: ICD-10-CM

## 2020-11-03 DIAGNOSIS — E11.69 HYPERLIPIDEMIA ASSOCIATED WITH TYPE 2 DIABETES MELLITUS: ICD-10-CM

## 2020-11-03 DIAGNOSIS — K40.90 LEFT INGUINAL HERNIA: ICD-10-CM

## 2020-11-03 DIAGNOSIS — I70.0 AORTIC ATHEROSCLEROSIS: ICD-10-CM

## 2020-11-03 DIAGNOSIS — F32.5 MAJOR DEPRESSIVE DISORDER IN FULL REMISSION, UNSPECIFIED WHETHER RECURRENT: ICD-10-CM

## 2020-11-03 PROCEDURE — 80048 BASIC METABOLIC PNL TOTAL CA: CPT

## 2020-11-03 PROCEDURE — 80061 LIPID PANEL: CPT

## 2020-11-03 PROCEDURE — G0008 ADMIN INFLUENZA VIRUS VAC: HCPCS | Mod: PBBFAC,PO

## 2020-11-03 PROCEDURE — 99999 PR PBB SHADOW E&M-EST. PATIENT-LVL IV: CPT | Mod: PBBFAC,,, | Performed by: INTERNAL MEDICINE

## 2020-11-03 PROCEDURE — 36415 COLL VENOUS BLD VENIPUNCTURE: CPT | Mod: PO

## 2020-11-03 PROCEDURE — 99214 PR OFFICE/OUTPT VISIT, EST, LEVL IV, 30-39 MIN: ICD-10-PCS | Mod: 25,S$PBB,, | Performed by: INTERNAL MEDICINE

## 2020-11-03 PROCEDURE — 90694 VACC AIIV4 NO PRSRV 0.5ML IM: CPT | Mod: PBBFAC,PO

## 2020-11-03 PROCEDURE — 83036 HEMOGLOBIN GLYCOSYLATED A1C: CPT

## 2020-11-03 PROCEDURE — 99214 OFFICE O/P EST MOD 30 MIN: CPT | Mod: 25,S$PBB,, | Performed by: INTERNAL MEDICINE

## 2020-11-03 PROCEDURE — 99214 OFFICE O/P EST MOD 30 MIN: CPT | Mod: PBBFAC,PO,25 | Performed by: INTERNAL MEDICINE

## 2020-11-03 PROCEDURE — 99999 PR PBB SHADOW E&M-EST. PATIENT-LVL IV: ICD-10-PCS | Mod: PBBFAC,,, | Performed by: INTERNAL MEDICINE

## 2020-11-03 NOTE — PROGRESS NOTES
Administered HD flu shot to left deltoid.  See immunization record.  Pt tolerated well.  Advised to wait at least 15 minutes to monitor for adverse reactions.  Pt verbalizes understanding.  VIS given.    ndc  [id number]  Lot   [id number]  Exp  6-

## 2020-11-03 NOTE — PROGRESS NOTES
HPI:  Patient is a 80-year-old man who comes today for follow-up of his diabetes, hypertension, lipids, atrial fibrillation and for his annual physical.  He states he is doing very well.  He denies any hypoglycemia.  His blood pressures been well controlled.  He denies any palpitations, chest pain or shortness of breath.      Current MEDS: medcard review, verified and update  Allergies: Per the electronic medical record    Past Medical History:   Diagnosis Date    Anxiety     Atrial fibrillation     BPH with obstruction/lower urinary tract symptoms     Depression     Diabetes mellitus type II, controlled     Hyperlipidemia associated with type 2 diabetes mellitus     Hypertension associated with diabetes        Past Surgical History:   Procedure Laterality Date    CARDIOVERSION N/A 1/23/2019    Procedure: CARDIOVERSION;  Surgeon: Charbel Pandey MD;  Location: [facility] CATH LAB;  Service: Cardiology;  Laterality: N/A;    COLONOSCOPY N/A 5/1/2017    Procedure: COLONOSCOPY;  Surgeon: Vishal Andrade MD;  Location: [facility] ENDO;  Service: Endoscopy;  Laterality: N/A;    TONSILLECTOMY         SHx: per the electronic medical record    FHx: recorded in the electronic medical record    ROS:    denies any chest pains or shortness of breath. Denies any nausea, vomiting or diarrhea. Denies any fever, chills or sweats. Denies any change in weight, voice, stool, skin or hair. Denies any dysuria, dyspepsia or dysphagia. Denies any change in vision, hearing or headaches. Denies any swollen lymph nodes or loss of memory.    PE:  /74   Pulse (!) 59   Temp 98 °F (36.7 °C)   Wt 90.9 kg (200 lb 6.4 oz)   SpO2 98%   BMI 29.59 kg/m²   Gen: Well-developed, well-nourished, male, in no acute distress, oriented x3  HEENT: neck is supple, no adenopathy, carotids 2+ equal without bruits, thyroid exam normal size without nodules.  CHEST: clear to auscultation and percussion  CVS: regular rate and rhythm without significant murmur,  gallop, or rubs  ABD: soft, benign, no rebound no guarding, no distention.  Bowel sounds are normal.     nontender.  No palpable masses.  No organomegaly and no audible bruits.  RECTAL:  Deferred.  EXT: no clubbing, cyanosis, or edema  LYMPH: no cervical, inguinal, or axillary adenopathy  FEET: no loss of sensation.  No ulcers or pressure sores.  Monofilament testing normal  NEURO: gait normal.  Cranial nerves II- XII intact. No nystagmus.  Speech normal.   Gross motor and sensory unremarkable.    Lab Results   Component Value Date    WBC 6.44 05/18/2020    HGB 14.4 05/18/2020    HCT 46.2 05/18/2020     05/18/2020    CHOL 147 05/18/2020    TRIG 63 05/18/2020    HDL 61 05/18/2020    ALT 16 05/18/2020    AST 21 05/18/2020     05/18/2020    K 4.6 05/18/2020     05/18/2020    CREATININE 1.1 05/18/2020    BUN 22 05/18/2020    CO2 25 05/18/2020    TSH 1.328 05/18/2020    PSA 2.9 10/10/2017    INR 1.0 01/22/2019    HGBA1C 5.7 (H) 05/18/2020       Impression:  Multiple problems below, stable  Patient Active Problem List   Diagnosis    Anxiety    Hyperlipidemia associated with type 2 diabetes mellitus    Hypertension associated with diabetes    Diabetes mellitus type II, controlled    Left inguinal hernia    History of colon polyps    BPH with obstruction/lower urinary tract symptoms    PAF (paroxysmal atrial fibrillation)    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  He will be seen again in 6 months with above lab work.  He will have lab work done today as well.  Medications remain the same      This note is generated with speech recognition software and is subject to transcription error and sound alike phrases that may be missed by  proofreading.

## 2020-11-04 ENCOUNTER — TELEPHONE (OUTPATIENT)
Dept: INTERNAL MEDICINE | Facility: CLINIC | Age: 80
End: 2020-11-04

## 2020-11-04 LAB
ANION GAP SERPL CALC-SCNC: 10 MMOL/L (ref 8–16)
BUN SERPL-MCNC: 33 MG/DL (ref 8–23)
CALCIUM SERPL-MCNC: 9.8 MG/DL (ref 8.7–10.5)
CHLORIDE SERPL-SCNC: 107 MMOL/L (ref 95–110)
CHOLEST SERPL-MCNC: 158 MG/DL (ref 120–199)
CHOLEST/HDLC SERPL: 2.4 {RATIO} (ref 2–5)
CO2 SERPL-SCNC: 25 MMOL/L (ref 23–29)
CREAT SERPL-MCNC: 1.1 MG/DL (ref 0.5–1.4)
EST. GFR  (AFRICAN AMERICAN): >60 ML/MIN/1.73 M^2
EST. GFR  (NON AFRICAN AMERICAN): >60 ML/MIN/1.73 M^2
ESTIMATED AVG GLUCOSE: 111 MG/DL (ref 68–131)
GLUCOSE SERPL-MCNC: 120 MG/DL (ref 70–110)
HBA1C MFR BLD HPLC: 5.5 % (ref 4–5.6)
HDLC SERPL-MCNC: 67 MG/DL (ref 40–75)
HDLC SERPL: 42.4 % (ref 20–50)
LDLC SERPL CALC-MCNC: 70.8 MG/DL (ref 63–159)
NONHDLC SERPL-MCNC: 91 MG/DL
POTASSIUM SERPL-SCNC: 4.8 MMOL/L (ref 3.5–5.1)
SODIUM SERPL-SCNC: 142 MMOL/L (ref 136–145)
TRIGL SERPL-MCNC: 101 MG/DL (ref 30–150)

## 2020-11-04 NOTE — TELEPHONE ENCOUNTER
Spoke with Huong ( daughter ) inform lab results review by Dr. Zarate and results are normal; Ms. Borja verbalized understanding

## 2020-11-04 NOTE — TELEPHONE ENCOUNTER
----- Message from Thee Zarate MD sent at 11/4/2020  6:19 AM CST -----  Your lab work was all in acceptable ranges.

## 2021-02-19 ENCOUNTER — PATIENT OUTREACH (OUTPATIENT)
Dept: ADMINISTRATIVE | Facility: OTHER | Age: 81
End: 2021-02-19

## 2021-02-22 ENCOUNTER — OFFICE VISIT (OUTPATIENT)
Dept: OPHTHALMOLOGY | Facility: CLINIC | Age: 81
End: 2021-02-22
Payer: MEDICARE

## 2021-02-22 DIAGNOSIS — E11.36 DIABETIC CATARACT: ICD-10-CM

## 2021-02-22 DIAGNOSIS — E11.9 TYPE 2 DIABETES MELLITUS WITHOUT RETINOPATHY: Primary | ICD-10-CM

## 2021-02-22 DIAGNOSIS — H52.7 REFRACTIVE ERRORS: ICD-10-CM

## 2021-02-22 PROCEDURE — 99999 PR PBB SHADOW E&M-EST. PATIENT-LVL III: CPT | Mod: PBBFAC,,, | Performed by: OPTOMETRIST

## 2021-02-22 PROCEDURE — 92014 COMPRE OPH EXAM EST PT 1/>: CPT | Mod: S$PBB,,, | Performed by: OPTOMETRIST

## 2021-02-22 PROCEDURE — 92014 PR EYE EXAM, EST PATIENT,COMPREHESV: ICD-10-PCS | Mod: S$PBB,,, | Performed by: OPTOMETRIST

## 2021-02-22 PROCEDURE — 99999 PR PBB SHADOW E&M-EST. PATIENT-LVL III: ICD-10-PCS | Mod: PBBFAC,,, | Performed by: OPTOMETRIST

## 2021-02-22 PROCEDURE — 92015 PR REFRACTION: ICD-10-PCS | Mod: ,,, | Performed by: OPTOMETRIST

## 2021-02-22 PROCEDURE — 92015 DETERMINE REFRACTIVE STATE: CPT | Mod: ,,, | Performed by: OPTOMETRIST

## 2021-02-22 PROCEDURE — 99213 OFFICE O/P EST LOW 20 MIN: CPT | Mod: PBBFAC | Performed by: OPTOMETRIST

## 2021-04-27 ENCOUNTER — LAB VISIT (OUTPATIENT)
Dept: LAB | Facility: HOSPITAL | Age: 81
End: 2021-04-27
Attending: INTERNAL MEDICINE
Payer: MEDICARE

## 2021-04-27 DIAGNOSIS — E11.9 CONTROLLED TYPE 2 DIABETES MELLITUS WITHOUT COMPLICATION, WITHOUT LONG-TERM CURRENT USE OF INSULIN: ICD-10-CM

## 2021-04-27 LAB
ALBUMIN SERPL BCP-MCNC: 3.7 G/DL (ref 3.5–5.2)
ALP SERPL-CCNC: 62 U/L (ref 55–135)
ALT SERPL W/O P-5'-P-CCNC: 16 U/L (ref 10–44)
ANION GAP SERPL CALC-SCNC: 6 MMOL/L (ref 8–16)
AST SERPL-CCNC: 22 U/L (ref 10–40)
BASOPHILS # BLD AUTO: 0.06 K/UL (ref 0–0.2)
BASOPHILS NFR BLD: 0.8 % (ref 0–1.9)
BILIRUB SERPL-MCNC: 0.8 MG/DL (ref 0.1–1)
BUN SERPL-MCNC: 16 MG/DL (ref 8–23)
CALCIUM SERPL-MCNC: 9.3 MG/DL (ref 8.7–10.5)
CHLORIDE SERPL-SCNC: 110 MMOL/L (ref 95–110)
CHOLEST SERPL-MCNC: 147 MG/DL (ref 120–199)
CHOLEST/HDLC SERPL: 2.4 {RATIO} (ref 2–5)
CO2 SERPL-SCNC: 25 MMOL/L (ref 23–29)
CREAT SERPL-MCNC: 1.1 MG/DL (ref 0.5–1.4)
DIFFERENTIAL METHOD: ABNORMAL
EOSINOPHIL # BLD AUTO: 0.2 K/UL (ref 0–0.5)
EOSINOPHIL NFR BLD: 2.7 % (ref 0–8)
ERYTHROCYTE [DISTWIDTH] IN BLOOD BY AUTOMATED COUNT: 12.6 % (ref 11.5–14.5)
EST. GFR  (AFRICAN AMERICAN): >60 ML/MIN/1.73 M^2
EST. GFR  (NON AFRICAN AMERICAN): >60 ML/MIN/1.73 M^2
GLUCOSE SERPL-MCNC: 151 MG/DL (ref 70–110)
HCT VFR BLD AUTO: 46.7 % (ref 40–54)
HDLC SERPL-MCNC: 61 MG/DL (ref 40–75)
HDLC SERPL: 41.5 % (ref 20–50)
HGB BLD-MCNC: 15.4 G/DL (ref 14–18)
IMM GRANULOCYTES # BLD AUTO: 0.07 K/UL (ref 0–0.04)
IMM GRANULOCYTES NFR BLD AUTO: 0.9 % (ref 0–0.5)
LDLC SERPL CALC-MCNC: 69.8 MG/DL (ref 63–159)
LYMPHOCYTES # BLD AUTO: 1.8 K/UL (ref 1–4.8)
LYMPHOCYTES NFR BLD: 23.8 % (ref 18–48)
MCH RBC QN AUTO: 31.9 PG (ref 27–31)
MCHC RBC AUTO-ENTMCNC: 33 G/DL (ref 32–36)
MCV RBC AUTO: 97 FL (ref 82–98)
MONOCYTES # BLD AUTO: 0.7 K/UL (ref 0.3–1)
MONOCYTES NFR BLD: 8.6 % (ref 4–15)
NEUTROPHILS # BLD AUTO: 4.8 K/UL (ref 1.8–7.7)
NEUTROPHILS NFR BLD: 63.2 % (ref 38–73)
NONHDLC SERPL-MCNC: 86 MG/DL
NRBC BLD-RTO: 0 /100 WBC
PLATELET # BLD AUTO: 190 K/UL (ref 150–450)
PMV BLD AUTO: 10.7 FL (ref 9.2–12.9)
POTASSIUM SERPL-SCNC: 4.5 MMOL/L (ref 3.5–5.1)
PROT SERPL-MCNC: 7.2 G/DL (ref 6–8.4)
RBC # BLD AUTO: 4.83 M/UL (ref 4.6–6.2)
SODIUM SERPL-SCNC: 141 MMOL/L (ref 136–145)
TRIGL SERPL-MCNC: 81 MG/DL (ref 30–150)
TSH SERPL DL<=0.005 MIU/L-ACNC: 1.82 UIU/ML (ref 0.4–4)
WBC # BLD AUTO: 7.64 K/UL (ref 3.9–12.7)

## 2021-04-27 PROCEDURE — 84443 ASSAY THYROID STIM HORMONE: CPT | Performed by: INTERNAL MEDICINE

## 2021-04-27 PROCEDURE — 83036 HEMOGLOBIN GLYCOSYLATED A1C: CPT | Performed by: INTERNAL MEDICINE

## 2021-04-27 PROCEDURE — 80053 COMPREHEN METABOLIC PANEL: CPT | Performed by: INTERNAL MEDICINE

## 2021-04-27 PROCEDURE — 85025 COMPLETE CBC W/AUTO DIFF WBC: CPT | Performed by: INTERNAL MEDICINE

## 2021-04-27 PROCEDURE — 80061 LIPID PANEL: CPT | Performed by: INTERNAL MEDICINE

## 2021-04-27 PROCEDURE — 36415 COLL VENOUS BLD VENIPUNCTURE: CPT | Mod: PO | Performed by: INTERNAL MEDICINE

## 2021-04-28 LAB
ESTIMATED AVG GLUCOSE: 120 MG/DL (ref 68–131)
HBA1C MFR BLD: 5.8 % (ref 4–5.6)

## 2021-04-29 ENCOUNTER — TELEPHONE (OUTPATIENT)
Dept: INTERNAL MEDICINE | Facility: CLINIC | Age: 81
End: 2021-04-29

## 2021-05-04 ENCOUNTER — TELEPHONE (OUTPATIENT)
Dept: INTERNAL MEDICINE | Facility: CLINIC | Age: 81
End: 2021-05-04

## 2021-05-04 ENCOUNTER — OFFICE VISIT (OUTPATIENT)
Dept: INTERNAL MEDICINE | Facility: CLINIC | Age: 81
End: 2021-05-04
Payer: MEDICARE

## 2021-05-04 VITALS
SYSTOLIC BLOOD PRESSURE: 130 MMHG | WEIGHT: 203.94 LBS | HEART RATE: 51 BPM | DIASTOLIC BLOOD PRESSURE: 80 MMHG | OXYGEN SATURATION: 98 % | BODY MASS INDEX: 28.55 KG/M2 | HEIGHT: 71 IN

## 2021-05-04 DIAGNOSIS — F33.1 MODERATE EPISODE OF RECURRENT MAJOR DEPRESSIVE DISORDER: ICD-10-CM

## 2021-05-04 PROCEDURE — 99213 OFFICE O/P EST LOW 20 MIN: CPT | Mod: PBBFAC,PO | Performed by: INTERNAL MEDICINE

## 2021-05-04 PROCEDURE — 99999 PR PBB SHADOW E&M-EST. PATIENT-LVL III: CPT | Mod: PBBFAC,,, | Performed by: INTERNAL MEDICINE

## 2021-05-04 PROCEDURE — 99213 OFFICE O/P EST LOW 20 MIN: CPT | Mod: S$PBB,,, | Performed by: INTERNAL MEDICINE

## 2021-05-04 PROCEDURE — 99213 PR OFFICE/OUTPT VISIT, EST, LEVL III, 20-29 MIN: ICD-10-PCS | Mod: S$PBB,,, | Performed by: INTERNAL MEDICINE

## 2021-05-04 PROCEDURE — 99999 PR PBB SHADOW E&M-EST. PATIENT-LVL III: ICD-10-PCS | Mod: PBBFAC,,, | Performed by: INTERNAL MEDICINE

## 2021-05-04 RX ORDER — ESCITALOPRAM OXALATE 10 MG/1
10 TABLET ORAL DAILY
Qty: 90 TABLET | Refills: 3 | Status: SHIPPED | OUTPATIENT
Start: 2021-05-04 | End: 2021-05-21

## 2021-05-21 ENCOUNTER — TELEPHONE (OUTPATIENT)
Dept: INTERNAL MEDICINE | Facility: CLINIC | Age: 81
End: 2021-05-21

## 2021-05-21 RX ORDER — SERTRALINE HYDROCHLORIDE 50 MG/1
50 TABLET, FILM COATED ORAL DAILY
Qty: 90 TABLET | Refills: 3 | Status: SHIPPED | OUTPATIENT
Start: 2021-05-21 | End: 2021-09-27

## 2021-06-08 ENCOUNTER — TELEPHONE (OUTPATIENT)
Dept: INTERNAL MEDICINE | Facility: CLINIC | Age: 81
End: 2021-06-08

## 2021-06-14 ENCOUNTER — PATIENT OUTREACH (OUTPATIENT)
Dept: ADMINISTRATIVE | Facility: HOSPITAL | Age: 81
End: 2021-06-14

## 2021-06-15 ENCOUNTER — TELEPHONE (OUTPATIENT)
Dept: INTERNAL MEDICINE | Facility: CLINIC | Age: 81
End: 2021-06-15

## 2021-06-23 DIAGNOSIS — E11.9 CONTROLLED TYPE 2 DIABETES MELLITUS WITHOUT COMPLICATION, WITHOUT LONG-TERM CURRENT USE OF INSULIN: Primary | ICD-10-CM

## 2021-06-23 RX ORDER — SIMVASTATIN 40 MG/1
TABLET, FILM COATED ORAL
Qty: 90 TABLET | Refills: 0 | Status: SHIPPED | OUTPATIENT
Start: 2021-06-23 | End: 2021-09-19

## 2021-06-23 RX ORDER — METFORMIN HYDROCHLORIDE 850 MG/1
TABLET ORAL
Qty: 90 TABLET | Refills: 0 | Status: SHIPPED | OUTPATIENT
Start: 2021-06-23 | End: 2021-09-19

## 2021-07-02 ENCOUNTER — TELEPHONE (OUTPATIENT)
Dept: UROLOGY | Facility: CLINIC | Age: 81
End: 2021-07-02

## 2021-07-15 ENCOUNTER — PATIENT OUTREACH (OUTPATIENT)
Dept: ADMINISTRATIVE | Facility: HOSPITAL | Age: 81
End: 2021-07-15

## 2021-09-09 ENCOUNTER — TELEPHONE (OUTPATIENT)
Dept: INTERNAL MEDICINE | Facility: CLINIC | Age: 81
End: 2021-09-09

## 2021-09-20 ENCOUNTER — LAB VISIT (OUTPATIENT)
Dept: LAB | Facility: HOSPITAL | Age: 81
End: 2021-09-20
Attending: INTERNAL MEDICINE
Payer: MEDICARE

## 2021-09-20 DIAGNOSIS — E11.9 CONTROLLED TYPE 2 DIABETES MELLITUS WITHOUT COMPLICATION, WITHOUT LONG-TERM CURRENT USE OF INSULIN: ICD-10-CM

## 2021-09-20 PROCEDURE — 84443 ASSAY THYROID STIM HORMONE: CPT | Performed by: INTERNAL MEDICINE

## 2021-09-20 PROCEDURE — 83036 HEMOGLOBIN GLYCOSYLATED A1C: CPT | Performed by: INTERNAL MEDICINE

## 2021-09-20 PROCEDURE — 80061 LIPID PANEL: CPT | Performed by: INTERNAL MEDICINE

## 2021-09-20 PROCEDURE — 80053 COMPREHEN METABOLIC PANEL: CPT | Performed by: INTERNAL MEDICINE

## 2021-09-20 PROCEDURE — 36415 COLL VENOUS BLD VENIPUNCTURE: CPT | Mod: PO | Performed by: INTERNAL MEDICINE

## 2021-09-21 LAB
ALBUMIN SERPL BCP-MCNC: 3.7 G/DL (ref 3.5–5.2)
ALP SERPL-CCNC: 57 U/L (ref 55–135)
ALT SERPL W/O P-5'-P-CCNC: 17 U/L (ref 10–44)
ANION GAP SERPL CALC-SCNC: 15 MMOL/L (ref 8–16)
AST SERPL-CCNC: 20 U/L (ref 10–40)
BILIRUB SERPL-MCNC: 0.6 MG/DL (ref 0.1–1)
BUN SERPL-MCNC: 21 MG/DL (ref 8–23)
CALCIUM SERPL-MCNC: 9.6 MG/DL (ref 8.7–10.5)
CHLORIDE SERPL-SCNC: 108 MMOL/L (ref 95–110)
CHOLEST SERPL-MCNC: 159 MG/DL (ref 120–199)
CHOLEST/HDLC SERPL: 2.7 {RATIO} (ref 2–5)
CO2 SERPL-SCNC: 17 MMOL/L (ref 23–29)
CREAT SERPL-MCNC: 1.2 MG/DL (ref 0.5–1.4)
EST. GFR  (AFRICAN AMERICAN): >60 ML/MIN/1.73 M^2
EST. GFR  (NON AFRICAN AMERICAN): 56 ML/MIN/1.73 M^2
ESTIMATED AVG GLUCOSE: 123 MG/DL (ref 68–131)
GLUCOSE SERPL-MCNC: 109 MG/DL (ref 70–110)
HBA1C MFR BLD: 5.9 % (ref 4–5.6)
HDLC SERPL-MCNC: 60 MG/DL (ref 40–75)
HDLC SERPL: 37.7 % (ref 20–50)
LDLC SERPL CALC-MCNC: 88 MG/DL (ref 63–159)
NONHDLC SERPL-MCNC: 99 MG/DL
POTASSIUM SERPL-SCNC: 4.1 MMOL/L (ref 3.5–5.1)
PROT SERPL-MCNC: 7.3 G/DL (ref 6–8.4)
SODIUM SERPL-SCNC: 140 MMOL/L (ref 136–145)
TRIGL SERPL-MCNC: 55 MG/DL (ref 30–150)
TSH SERPL DL<=0.005 MIU/L-ACNC: 1.85 UIU/ML (ref 0.4–4)

## 2021-09-27 ENCOUNTER — OFFICE VISIT (OUTPATIENT)
Dept: INTERNAL MEDICINE | Facility: CLINIC | Age: 81
End: 2021-09-27
Payer: MEDICARE

## 2021-09-27 VITALS
OXYGEN SATURATION: 98 % | HEART RATE: 56 BPM | DIASTOLIC BLOOD PRESSURE: 76 MMHG | HEIGHT: 71 IN | TEMPERATURE: 98 F | WEIGHT: 211.88 LBS | BODY MASS INDEX: 29.66 KG/M2 | SYSTOLIC BLOOD PRESSURE: 122 MMHG

## 2021-09-27 DIAGNOSIS — K40.90 LEFT INGUINAL HERNIA: ICD-10-CM

## 2021-09-27 DIAGNOSIS — N13.8 BPH WITH OBSTRUCTION/LOWER URINARY TRACT SYMPTOMS: ICD-10-CM

## 2021-09-27 DIAGNOSIS — E78.5 HYPERLIPIDEMIA ASSOCIATED WITH TYPE 2 DIABETES MELLITUS: ICD-10-CM

## 2021-09-27 DIAGNOSIS — E11.59 HYPERTENSION ASSOCIATED WITH DIABETES: ICD-10-CM

## 2021-09-27 DIAGNOSIS — I48.0 PAF (PAROXYSMAL ATRIAL FIBRILLATION): ICD-10-CM

## 2021-09-27 DIAGNOSIS — N40.1 BPH WITH OBSTRUCTION/LOWER URINARY TRACT SYMPTOMS: ICD-10-CM

## 2021-09-27 DIAGNOSIS — I70.0 AORTIC ATHEROSCLEROSIS: ICD-10-CM

## 2021-09-27 DIAGNOSIS — Z86.010 HISTORY OF COLON POLYPS: ICD-10-CM

## 2021-09-27 DIAGNOSIS — E11.9 CONTROLLED TYPE 2 DIABETES MELLITUS WITHOUT COMPLICATION, WITHOUT LONG-TERM CURRENT USE OF INSULIN: Primary | ICD-10-CM

## 2021-09-27 DIAGNOSIS — E11.69 HYPERLIPIDEMIA ASSOCIATED WITH TYPE 2 DIABETES MELLITUS: ICD-10-CM

## 2021-09-27 DIAGNOSIS — I15.2 HYPERTENSION ASSOCIATED WITH DIABETES: ICD-10-CM

## 2021-09-27 DIAGNOSIS — F41.9 ANXIETY: ICD-10-CM

## 2021-09-27 DIAGNOSIS — F32.5 MAJOR DEPRESSIVE DISORDER IN FULL REMISSION, UNSPECIFIED WHETHER RECURRENT: ICD-10-CM

## 2021-09-27 PROCEDURE — 99999 PR PBB SHADOW E&M-EST. PATIENT-LVL IV: CPT | Mod: PBBFAC,,, | Performed by: INTERNAL MEDICINE

## 2021-09-27 PROCEDURE — 99214 OFFICE O/P EST MOD 30 MIN: CPT | Mod: S$PBB,,, | Performed by: INTERNAL MEDICINE

## 2021-09-27 PROCEDURE — 99214 PR OFFICE/OUTPT VISIT, EST, LEVL IV, 30-39 MIN: ICD-10-PCS | Mod: S$PBB,,, | Performed by: INTERNAL MEDICINE

## 2021-09-27 PROCEDURE — 99999 PR PBB SHADOW E&M-EST. PATIENT-LVL IV: ICD-10-PCS | Mod: PBBFAC,,, | Performed by: INTERNAL MEDICINE

## 2021-09-27 PROCEDURE — 99214 OFFICE O/P EST MOD 30 MIN: CPT | Mod: PBBFAC,PO | Performed by: INTERNAL MEDICINE

## 2021-10-05 ENCOUNTER — IMMUNIZATION (OUTPATIENT)
Dept: INTERNAL MEDICINE | Facility: CLINIC | Age: 81
End: 2021-10-05
Payer: MEDICARE

## 2021-10-05 ENCOUNTER — OFFICE VISIT (OUTPATIENT)
Dept: INTERNAL MEDICINE | Facility: CLINIC | Age: 81
End: 2021-10-05
Payer: MEDICARE

## 2021-10-05 VITALS
HEART RATE: 60 BPM | OXYGEN SATURATION: 98 % | DIASTOLIC BLOOD PRESSURE: 80 MMHG | BODY MASS INDEX: 29.78 KG/M2 | WEIGHT: 212.75 LBS | HEIGHT: 71 IN | SYSTOLIC BLOOD PRESSURE: 160 MMHG

## 2021-10-05 DIAGNOSIS — F32.5 MAJOR DEPRESSIVE DISORDER IN FULL REMISSION, UNSPECIFIED WHETHER RECURRENT: ICD-10-CM

## 2021-10-05 DIAGNOSIS — I70.0 AORTIC ATHEROSCLEROSIS: ICD-10-CM

## 2021-10-05 DIAGNOSIS — I10 HYPERTENSION, UNSPECIFIED TYPE: ICD-10-CM

## 2021-10-05 DIAGNOSIS — H91.90 DECREASED HEARING, UNSPECIFIED LATERALITY: ICD-10-CM

## 2021-10-05 DIAGNOSIS — Z00.00 ENCOUNTER FOR PREVENTIVE HEALTH EXAMINATION: Primary | ICD-10-CM

## 2021-10-05 DIAGNOSIS — F41.9 ANXIETY: ICD-10-CM

## 2021-10-05 DIAGNOSIS — E11.69 HYPERLIPIDEMIA ASSOCIATED WITH TYPE 2 DIABETES MELLITUS: ICD-10-CM

## 2021-10-05 DIAGNOSIS — I48.0 PAF (PAROXYSMAL ATRIAL FIBRILLATION): ICD-10-CM

## 2021-10-05 DIAGNOSIS — E78.5 HYPERLIPIDEMIA ASSOCIATED WITH TYPE 2 DIABETES MELLITUS: ICD-10-CM

## 2021-10-05 DIAGNOSIS — R03.0 ELEVATED BLOOD PRESSURE READING: ICD-10-CM

## 2021-10-05 PROCEDURE — 99214 OFFICE O/P EST MOD 30 MIN: CPT | Mod: PBBFAC | Performed by: NURSE PRACTITIONER

## 2021-10-05 PROCEDURE — 99999 PR PBB SHADOW E&M-EST. PATIENT-LVL IV: CPT | Mod: PBBFAC,,, | Performed by: NURSE PRACTITIONER

## 2021-10-05 PROCEDURE — G0439 PR MEDICARE ANNUAL WELLNESS SUBSEQUENT VISIT: ICD-10-PCS | Mod: ,,, | Performed by: NURSE PRACTITIONER

## 2021-10-05 PROCEDURE — 99999 PR PBB SHADOW E&M-EST. PATIENT-LVL IV: ICD-10-PCS | Mod: PBBFAC,,, | Performed by: NURSE PRACTITIONER

## 2021-10-05 PROCEDURE — 90694 VACC AIIV4 NO PRSRV 0.5ML IM: CPT | Mod: PBBFAC

## 2021-10-05 PROCEDURE — G0008 ADMIN INFLUENZA VIRUS VAC: HCPCS | Mod: PBBFAC

## 2021-10-05 PROCEDURE — G0439 PPPS, SUBSEQ VISIT: HCPCS | Mod: ,,, | Performed by: NURSE PRACTITIONER

## 2021-10-11 ENCOUNTER — CLINICAL SUPPORT (OUTPATIENT)
Dept: INTERNAL MEDICINE | Facility: CLINIC | Age: 81
End: 2021-10-11
Payer: MEDICARE

## 2021-10-11 VITALS — DIASTOLIC BLOOD PRESSURE: 75 MMHG | SYSTOLIC BLOOD PRESSURE: 161 MMHG

## 2021-10-11 DIAGNOSIS — Z01.30 BLOOD PRESSURE CHECK: Primary | ICD-10-CM

## 2021-10-11 PROCEDURE — 99999 PR PBB SHADOW E&M-EST. PATIENT-LVL I: ICD-10-PCS | Mod: PBBFAC,,,

## 2021-10-11 PROCEDURE — 99999 PR PBB SHADOW E&M-EST. PATIENT-LVL I: CPT | Mod: PBBFAC,,,

## 2021-10-11 PROCEDURE — 99211 OFF/OP EST MAY X REQ PHY/QHP: CPT | Mod: PBBFAC,PO

## 2021-10-11 RX ORDER — LISINOPRIL 20 MG/1
20 TABLET ORAL DAILY
Qty: 90 TABLET | Refills: 3 | Status: SHIPPED | OUTPATIENT
Start: 2021-10-11 | End: 2021-11-03 | Stop reason: SDUPTHER

## 2021-11-03 ENCOUNTER — HOSPITAL ENCOUNTER (OUTPATIENT)
Dept: CARDIOLOGY | Facility: HOSPITAL | Age: 81
Discharge: HOME | End: 2021-11-03
Payer: MEDICARE

## 2021-11-03 ENCOUNTER — OFFICE VISIT (OUTPATIENT)
Dept: CARDIOLOGY | Facility: CLINIC | Age: 81
End: 2021-11-03
Payer: MEDICARE

## 2021-11-03 VITALS
DIASTOLIC BLOOD PRESSURE: 102 MMHG | BODY MASS INDEX: 28.96 KG/M2 | OXYGEN SATURATION: 98 % | SYSTOLIC BLOOD PRESSURE: 150 MMHG | WEIGHT: 207.69 LBS | HEART RATE: 51 BPM

## 2021-11-03 DIAGNOSIS — I48.0 PAF (PAROXYSMAL ATRIAL FIBRILLATION): Primary | ICD-10-CM

## 2021-11-03 DIAGNOSIS — I15.2 HYPERTENSION ASSOCIATED WITH DIABETES: ICD-10-CM

## 2021-11-03 DIAGNOSIS — E11.9 CONTROLLED TYPE 2 DIABETES MELLITUS WITHOUT COMPLICATION, WITHOUT LONG-TERM CURRENT USE OF INSULIN: ICD-10-CM

## 2021-11-03 DIAGNOSIS — I48.0 PAF (PAROXYSMAL ATRIAL FIBRILLATION): ICD-10-CM

## 2021-11-03 DIAGNOSIS — E11.59 HYPERTENSION ASSOCIATED WITH DIABETES: ICD-10-CM

## 2021-11-03 DIAGNOSIS — E11.69 HYPERLIPIDEMIA ASSOCIATED WITH TYPE 2 DIABETES MELLITUS: ICD-10-CM

## 2021-11-03 DIAGNOSIS — E78.5 HYPERLIPIDEMIA ASSOCIATED WITH TYPE 2 DIABETES MELLITUS: ICD-10-CM

## 2021-11-03 DIAGNOSIS — I70.0 AORTIC ATHEROSCLEROSIS: ICD-10-CM

## 2021-11-03 PROCEDURE — 93005 ELECTROCARDIOGRAM TRACING: CPT

## 2021-11-03 PROCEDURE — 99213 OFFICE O/P EST LOW 20 MIN: CPT | Mod: PBBFAC | Performed by: NURSE PRACTITIONER

## 2021-11-03 PROCEDURE — 99999 PR PBB SHADOW E&M-EST. PATIENT-LVL III: ICD-10-PCS | Mod: PBBFAC,,, | Performed by: NURSE PRACTITIONER

## 2021-11-03 PROCEDURE — 93010 ELECTROCARDIOGRAM REPORT: CPT | Mod: ,,, | Performed by: INTERNAL MEDICINE

## 2021-11-03 PROCEDURE — 99999 PR PBB SHADOW E&M-EST. PATIENT-LVL III: CPT | Mod: PBBFAC,,, | Performed by: NURSE PRACTITIONER

## 2021-11-03 PROCEDURE — 99214 OFFICE O/P EST MOD 30 MIN: CPT | Mod: S$PBB,,, | Performed by: NURSE PRACTITIONER

## 2021-11-03 PROCEDURE — 99214 PR OFFICE/OUTPT VISIT, EST, LEVL IV, 30-39 MIN: ICD-10-PCS | Mod: S$PBB,,, | Performed by: NURSE PRACTITIONER

## 2021-11-03 PROCEDURE — 93010 EKG 12-LEAD: ICD-10-PCS | Mod: ,,, | Performed by: INTERNAL MEDICINE

## 2021-11-03 RX ORDER — LISINOPRIL 20 MG/1
20 TABLET ORAL 2 TIMES DAILY
Qty: 180 TABLET | Refills: 3 | Status: SHIPPED | OUTPATIENT
Start: 2021-11-03 | End: 2021-12-22 | Stop reason: SDUPTHER

## 2021-12-22 DIAGNOSIS — I15.2 HYPERTENSION ASSOCIATED WITH DIABETES: ICD-10-CM

## 2021-12-22 DIAGNOSIS — E11.59 HYPERTENSION ASSOCIATED WITH DIABETES: ICD-10-CM

## 2021-12-22 RX ORDER — LISINOPRIL 20 MG/1
20 TABLET ORAL 2 TIMES DAILY
Qty: 180 TABLET | Refills: 3 | Status: SHIPPED | OUTPATIENT
Start: 2021-12-22 | End: 2023-02-09

## 2022-03-21 ENCOUNTER — PATIENT OUTREACH (OUTPATIENT)
Dept: ADMINISTRATIVE | Facility: HOSPITAL | Age: 82
End: 2022-03-21
Payer: MEDICARE

## 2022-03-21 ENCOUNTER — LAB VISIT (OUTPATIENT)
Dept: LAB | Facility: HOSPITAL | Age: 82
End: 2022-03-21
Attending: INTERNAL MEDICINE
Payer: MEDICARE

## 2022-03-21 DIAGNOSIS — E11.59 HYPERTENSION ASSOCIATED WITH DIABETES: ICD-10-CM

## 2022-03-21 DIAGNOSIS — I15.2 HYPERTENSION ASSOCIATED WITH DIABETES: ICD-10-CM

## 2022-03-21 DIAGNOSIS — E11.9 CONTROLLED TYPE 2 DIABETES MELLITUS WITHOUT COMPLICATION, WITHOUT LONG-TERM CURRENT USE OF INSULIN: ICD-10-CM

## 2022-03-21 DIAGNOSIS — E11.9 CONTROLLED TYPE 2 DIABETES MELLITUS WITHOUT COMPLICATION, WITHOUT LONG-TERM CURRENT USE OF INSULIN: Primary | ICD-10-CM

## 2022-03-21 LAB
ALBUMIN SERPL BCP-MCNC: 3.8 G/DL (ref 3.5–5.2)
ALBUMIN/CREAT UR: 7.5 UG/MG (ref 0–30)
ALP SERPL-CCNC: 48 U/L (ref 55–135)
ALT SERPL W/O P-5'-P-CCNC: 18 U/L (ref 10–44)
ANION GAP SERPL CALC-SCNC: 9 MMOL/L (ref 8–16)
AST SERPL-CCNC: 20 U/L (ref 10–40)
BASOPHILS # BLD AUTO: 0.07 K/UL (ref 0–0.2)
BASOPHILS NFR BLD: 0.9 % (ref 0–1.9)
BILIRUB SERPL-MCNC: 0.4 MG/DL (ref 0.1–1)
BUN SERPL-MCNC: 24 MG/DL (ref 8–23)
CALCIUM SERPL-MCNC: 10.3 MG/DL (ref 8.7–10.5)
CHLORIDE SERPL-SCNC: 108 MMOL/L (ref 95–110)
CHOLEST SERPL-MCNC: 161 MG/DL (ref 120–199)
CHOLEST/HDLC SERPL: 2.6 {RATIO} (ref 2–5)
CO2 SERPL-SCNC: 26 MMOL/L (ref 23–29)
CREAT SERPL-MCNC: 1 MG/DL (ref 0.5–1.4)
CREAT UR-MCNC: 133 MG/DL (ref 23–375)
DIFFERENTIAL METHOD: ABNORMAL
EOSINOPHIL # BLD AUTO: 0.3 K/UL (ref 0–0.5)
EOSINOPHIL NFR BLD: 3.8 % (ref 0–8)
ERYTHROCYTE [DISTWIDTH] IN BLOOD BY AUTOMATED COUNT: 12.5 % (ref 11.5–14.5)
EST. GFR  (AFRICAN AMERICAN): >60 ML/MIN/1.73 M^2
EST. GFR  (NON AFRICAN AMERICAN): >60 ML/MIN/1.73 M^2
ESTIMATED AVG GLUCOSE: 111 MG/DL (ref 68–131)
GLUCOSE SERPL-MCNC: 100 MG/DL (ref 70–110)
HBA1C MFR BLD: 5.5 % (ref 4–5.6)
HCT VFR BLD AUTO: 46.4 % (ref 40–54)
HDLC SERPL-MCNC: 61 MG/DL (ref 40–75)
HDLC SERPL: 37.9 % (ref 20–50)
HGB BLD-MCNC: 14.7 G/DL (ref 14–18)
IMM GRANULOCYTES # BLD AUTO: 0.04 K/UL (ref 0–0.04)
IMM GRANULOCYTES NFR BLD AUTO: 0.5 % (ref 0–0.5)
LDLC SERPL CALC-MCNC: 87.4 MG/DL (ref 63–159)
LYMPHOCYTES # BLD AUTO: 2.5 K/UL (ref 1–4.8)
LYMPHOCYTES NFR BLD: 32.4 % (ref 18–48)
MCH RBC QN AUTO: 31.8 PG (ref 27–31)
MCHC RBC AUTO-ENTMCNC: 31.7 G/DL (ref 32–36)
MCV RBC AUTO: 100 FL (ref 82–98)
MICROALBUMIN UR DL<=1MG/L-MCNC: 10 UG/ML
MONOCYTES # BLD AUTO: 0.7 K/UL (ref 0.3–1)
MONOCYTES NFR BLD: 9.1 % (ref 4–15)
NEUTROPHILS # BLD AUTO: 4.2 K/UL (ref 1.8–7.7)
NEUTROPHILS NFR BLD: 53.3 % (ref 38–73)
NONHDLC SERPL-MCNC: 100 MG/DL
NRBC BLD-RTO: 0 /100 WBC
PLATELET # BLD AUTO: 205 K/UL (ref 150–450)
PMV BLD AUTO: 10 FL (ref 9.2–12.9)
POTASSIUM SERPL-SCNC: 4.5 MMOL/L (ref 3.5–5.1)
PROT SERPL-MCNC: 7.4 G/DL (ref 6–8.4)
RBC # BLD AUTO: 4.62 M/UL (ref 4.6–6.2)
SODIUM SERPL-SCNC: 143 MMOL/L (ref 136–145)
TRIGL SERPL-MCNC: 63 MG/DL (ref 30–150)
TSH SERPL DL<=0.005 MIU/L-ACNC: 1.79 UIU/ML (ref 0.4–4)
WBC # BLD AUTO: 7.83 K/UL (ref 3.9–12.7)

## 2022-03-21 PROCEDURE — 84443 ASSAY THYROID STIM HORMONE: CPT | Performed by: INTERNAL MEDICINE

## 2022-03-21 PROCEDURE — 82043 UR ALBUMIN QUANTITATIVE: CPT | Performed by: INTERNAL MEDICINE

## 2022-03-21 PROCEDURE — 85025 COMPLETE CBC W/AUTO DIFF WBC: CPT | Performed by: INTERNAL MEDICINE

## 2022-03-21 PROCEDURE — 83036 HEMOGLOBIN GLYCOSYLATED A1C: CPT | Performed by: INTERNAL MEDICINE

## 2022-03-21 PROCEDURE — 82570 ASSAY OF URINE CREATININE: CPT | Performed by: INTERNAL MEDICINE

## 2022-03-21 PROCEDURE — 36415 COLL VENOUS BLD VENIPUNCTURE: CPT | Mod: PO | Performed by: INTERNAL MEDICINE

## 2022-03-21 PROCEDURE — 80053 COMPREHEN METABOLIC PANEL: CPT | Performed by: INTERNAL MEDICINE

## 2022-03-21 PROCEDURE — 80061 LIPID PANEL: CPT | Performed by: INTERNAL MEDICINE

## 2022-03-21 NOTE — PROGRESS NOTES
Working Diabetes    Pt has lab appt scheduled, 3/21/22.  Micro albumin urine ordered and linked to appt.

## 2022-03-28 ENCOUNTER — OFFICE VISIT (OUTPATIENT)
Dept: INTERNAL MEDICINE | Facility: CLINIC | Age: 82
End: 2022-03-28
Payer: MEDICARE

## 2022-03-28 VITALS
HEIGHT: 71 IN | SYSTOLIC BLOOD PRESSURE: 128 MMHG | WEIGHT: 207.44 LBS | HEART RATE: 51 BPM | DIASTOLIC BLOOD PRESSURE: 60 MMHG | BODY MASS INDEX: 29.04 KG/M2 | OXYGEN SATURATION: 98 %

## 2022-03-28 DIAGNOSIS — I48.0 PAF (PAROXYSMAL ATRIAL FIBRILLATION): ICD-10-CM

## 2022-03-28 DIAGNOSIS — F33.1 MODERATE EPISODE OF RECURRENT MAJOR DEPRESSIVE DISORDER: ICD-10-CM

## 2022-03-28 DIAGNOSIS — F32.5 MAJOR DEPRESSIVE DISORDER IN FULL REMISSION, UNSPECIFIED WHETHER RECURRENT: ICD-10-CM

## 2022-03-28 DIAGNOSIS — E11.59 HYPERTENSION ASSOCIATED WITH DIABETES: ICD-10-CM

## 2022-03-28 DIAGNOSIS — E11.69 HYPERLIPIDEMIA ASSOCIATED WITH TYPE 2 DIABETES MELLITUS: ICD-10-CM

## 2022-03-28 DIAGNOSIS — Z12.11 SCREEN FOR COLON CANCER: ICD-10-CM

## 2022-03-28 DIAGNOSIS — K40.90 LEFT INGUINAL HERNIA: ICD-10-CM

## 2022-03-28 DIAGNOSIS — E78.5 HYPERLIPIDEMIA ASSOCIATED WITH TYPE 2 DIABETES MELLITUS: ICD-10-CM

## 2022-03-28 DIAGNOSIS — E11.9 CONTROLLED TYPE 2 DIABETES MELLITUS WITHOUT COMPLICATION, WITHOUT LONG-TERM CURRENT USE OF INSULIN: Primary | ICD-10-CM

## 2022-03-28 DIAGNOSIS — N40.1 BPH WITH OBSTRUCTION/LOWER URINARY TRACT SYMPTOMS: ICD-10-CM

## 2022-03-28 DIAGNOSIS — Z86.010 HISTORY OF COLON POLYPS: ICD-10-CM

## 2022-03-28 DIAGNOSIS — I15.2 HYPERTENSION ASSOCIATED WITH DIABETES: ICD-10-CM

## 2022-03-28 DIAGNOSIS — N13.8 BPH WITH OBSTRUCTION/LOWER URINARY TRACT SYMPTOMS: ICD-10-CM

## 2022-03-28 DIAGNOSIS — F41.9 ANXIETY: ICD-10-CM

## 2022-03-28 DIAGNOSIS — I70.0 AORTIC ATHEROSCLEROSIS: ICD-10-CM

## 2022-03-28 PROCEDURE — 99214 OFFICE O/P EST MOD 30 MIN: CPT | Mod: PBBFAC,PO | Performed by: INTERNAL MEDICINE

## 2022-03-28 PROCEDURE — 99999 PR PBB SHADOW E&M-EST. PATIENT-LVL IV: CPT | Mod: PBBFAC,,, | Performed by: INTERNAL MEDICINE

## 2022-03-28 PROCEDURE — 99214 OFFICE O/P EST MOD 30 MIN: CPT | Mod: S$PBB,,, | Performed by: INTERNAL MEDICINE

## 2022-03-28 PROCEDURE — 99999 PR PBB SHADOW E&M-EST. PATIENT-LVL IV: ICD-10-PCS | Mod: PBBFAC,,, | Performed by: INTERNAL MEDICINE

## 2022-03-28 PROCEDURE — 99214 PR OFFICE/OUTPT VISIT, EST, LEVL IV, 30-39 MIN: ICD-10-PCS | Mod: S$PBB,,, | Performed by: INTERNAL MEDICINE

## 2022-03-28 NOTE — PROGRESS NOTES
"HPI:  Patient is an 81-year-old man who comes today for follow-up of his atrial fibrillation, diabetes, hypertension and lipids.  He has been doing quite well.  He denies any chest pains or shortness of breath.  He has had no palpitations.  His blood sugars have been doing very well.  He denies any hypoglycemia.  His blood pressures been well controlled.  He denies any other new problems or complaints.      Current MEDS: medcard review, verified and update  Allergies: Per the electronic medical record    Past Medical History:   Diagnosis Date    Anxiety     Atrial fibrillation     BPH with obstruction/lower urinary tract symptoms     Depression     Diabetes mellitus type II, controlled     DM (diabetes mellitus)     BS doesn't check 02/22/2021    Hyperlipidemia associated with type 2 diabetes mellitus     Hypertension associated with diabetes        Past Surgical History:   Procedure Laterality Date    CARDIOVERSION N/A 1/23/2019    Procedure: CARDIOVERSION;  Surgeon: Charbel Pandey MD;  Location: [facility] CATH LAB;  Service: Cardiology;  Laterality: N/A;    COLONOSCOPY N/A 5/1/2017    Procedure: COLONOSCOPY;  Surgeon: Vishal Andrade MD;  Location: [facility] ENDO;  Service: Endoscopy;  Laterality: N/A;    TONSILLECTOMY         SHx: per the electronic medical record    FHx: recorded in the electronic medical record    ROS:    denies any chest pains or shortness of breath. Denies any nausea, vomiting or diarrhea. Denies any fever, chills or sweats. Denies any change in weight, voice, stool, skin or hair. Denies any dysuria, dyspepsia or dysphagia. Denies any change in vision, hearing or headaches. Denies any swollen lymph nodes or loss of memory.    PE:  /60 (BP Location: Right arm)   Pulse (!) 51   Ht 5' 11" (1.803 m)   Wt 94.1 kg (207 lb 7.3 oz)   SpO2 98%   BMI 28.93 kg/m²   Gen: Well-developed, well-nourished, male, in no acute distress, oriented x3  HEENT: neck is supple, no adenopathy, carotids 2+ " equal without bruits, thyroid exam normal size without nodules.  CHEST: clear to auscultation and percussion  CVS: irregular rate and rhythm without significant murmur, gallop, or rubs  ABD: soft, benign, no rebound no guarding, no distention.  Bowel sounds are normal.     nontender.  No palpable masses.  No organomegaly and no audible bruits.  RECTAL:  Deferred.  EXT: no clubbing, cyanosis, or edema  LYMPH: no cervical, inguinal, or axillary adenopathy  FEET: no loss of sensation.  No ulcers or pressure sores.  Monofilament testing normal  NEURO: gait normal.  Cranial nerves II- XII intact. No nystagmus.  Speech normal.   Gross motor and sensory unremarkable.    Lab Results   Component Value Date    WBC 7.83 03/21/2022    HGB 14.7 03/21/2022    HCT 46.4 03/21/2022     03/21/2022    CHOL 161 03/21/2022    TRIG 63 03/21/2022    HDL 61 03/21/2022    ALT 18 03/21/2022    AST 20 03/21/2022     03/21/2022    K 4.5 03/21/2022     03/21/2022    CREATININE 1.0 03/21/2022    BUN 24 (H) 03/21/2022    CO2 26 03/21/2022    TSH 1.787 03/21/2022    PSA 2.9 10/10/2017    INR 1.0 01/22/2019    HGBA1C 5.5 03/21/2022       Impression:  Multiple medical problems below, stable  Patient Active Problem List   Diagnosis    Anxiety    Hyperlipidemia associated with type 2 diabetes mellitus    Hypertension associated with diabetes    Diabetes mellitus type II, controlled    Left inguinal hernia    History of colon polyps    BPH with obstruction/lower urinary tract symptoms    PAF (paroxysmal atrial fibrillation)    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  Patient was also due for his eye exam.  He will see me again in 6 months with above lab work.   Medications all remain the same  This note is generated with speech recognition software and is subject to transcription error and sound alike phrases that may be missed by proofreading.

## 2022-03-31 ENCOUNTER — TELEPHONE (OUTPATIENT)
Dept: INTERNAL MEDICINE | Facility: CLINIC | Age: 82
End: 2022-03-31
Payer: MEDICARE

## 2022-04-08 ENCOUNTER — HOSPITAL ENCOUNTER (EMERGENCY)
Facility: HOSPITAL | Age: 82
Discharge: HOME | End: 2022-04-08
Attending: EMERGENCY MEDICINE
Payer: MEDICARE

## 2022-04-08 VITALS
DIASTOLIC BLOOD PRESSURE: 65 MMHG | HEART RATE: 62 BPM | OXYGEN SATURATION: 97 % | BODY MASS INDEX: 28.98 KG/M2 | SYSTOLIC BLOOD PRESSURE: 144 MMHG | HEIGHT: 71 IN | TEMPERATURE: 98 F | WEIGHT: 207 LBS | RESPIRATION RATE: 23 BRPM

## 2022-04-08 DIAGNOSIS — R10.13 EPIGASTRIC PAIN: ICD-10-CM

## 2022-04-08 DIAGNOSIS — R79.89 TROPONIN I ABOVE REFERENCE RANGE: Primary | ICD-10-CM

## 2022-04-08 LAB
ALBUMIN SERPL BCP-MCNC: 3.6 G/DL (ref 3.5–5.2)
ALP SERPL-CCNC: 50 U/L (ref 55–135)
ALT SERPL W/O P-5'-P-CCNC: 16 U/L (ref 10–44)
ANION GAP SERPL CALC-SCNC: 11 MMOL/L (ref 8–16)
AST SERPL-CCNC: 20 U/L (ref 10–40)
BASOPHILS # BLD AUTO: 0.04 K/UL (ref 0–0.2)
BASOPHILS NFR BLD: 0.4 % (ref 0–1.9)
BILIRUB SERPL-MCNC: 0.9 MG/DL (ref 0.1–1)
BILIRUB UR QL STRIP: NEGATIVE
BUN SERPL-MCNC: 20 MG/DL (ref 8–23)
CALCIUM SERPL-MCNC: 9.5 MG/DL (ref 8.7–10.5)
CHLORIDE SERPL-SCNC: 106 MMOL/L (ref 95–110)
CLARITY UR: CLEAR
CO2 SERPL-SCNC: 20 MMOL/L (ref 23–29)
COLOR UR: YELLOW
CREAT SERPL-MCNC: 1.2 MG/DL (ref 0.5–1.4)
DIFFERENTIAL METHOD: ABNORMAL
EOSINOPHIL # BLD AUTO: 0.1 K/UL (ref 0–0.5)
EOSINOPHIL NFR BLD: 0.5 % (ref 0–8)
ERYTHROCYTE [DISTWIDTH] IN BLOOD BY AUTOMATED COUNT: 12.3 % (ref 11.5–14.5)
EST. GFR  (AFRICAN AMERICAN): >60 ML/MIN/1.73 M^2
EST. GFR  (NON AFRICAN AMERICAN): 56 ML/MIN/1.73 M^2
GLUCOSE SERPL-MCNC: 155 MG/DL (ref 70–110)
GLUCOSE UR QL STRIP: NEGATIVE
HCT VFR BLD AUTO: 42.7 % (ref 40–54)
HGB BLD-MCNC: 14.5 G/DL (ref 14–18)
HGB UR QL STRIP: NEGATIVE
IMM GRANULOCYTES # BLD AUTO: 0.06 K/UL (ref 0–0.04)
IMM GRANULOCYTES NFR BLD AUTO: 0.6 % (ref 0–0.5)
KETONES UR QL STRIP: ABNORMAL
LEUKOCYTE ESTERASE UR QL STRIP: NEGATIVE
LIPASE SERPL-CCNC: 32 U/L (ref 4–60)
LYMPHOCYTES # BLD AUTO: 1.8 K/UL (ref 1–4.8)
LYMPHOCYTES NFR BLD: 18.8 % (ref 18–48)
MCH RBC QN AUTO: 31.3 PG (ref 27–31)
MCHC RBC AUTO-ENTMCNC: 34 G/DL (ref 32–36)
MCV RBC AUTO: 92 FL (ref 82–98)
MONOCYTES # BLD AUTO: 0.8 K/UL (ref 0.3–1)
MONOCYTES NFR BLD: 8.1 % (ref 4–15)
NEUTROPHILS # BLD AUTO: 6.8 K/UL (ref 1.8–7.7)
NEUTROPHILS NFR BLD: 71.6 % (ref 38–73)
NITRITE UR QL STRIP: NEGATIVE
NRBC BLD-RTO: 0 /100 WBC
PH UR STRIP: 7 [PH] (ref 5–8)
PLATELET # BLD AUTO: 213 K/UL (ref 150–450)
PMV BLD AUTO: 9.7 FL (ref 9.2–12.9)
POTASSIUM SERPL-SCNC: 3.9 MMOL/L (ref 3.5–5.1)
PROT SERPL-MCNC: 6.9 G/DL (ref 6–8.4)
PROT UR QL STRIP: NEGATIVE
RBC # BLD AUTO: 4.64 M/UL (ref 4.6–6.2)
SODIUM SERPL-SCNC: 137 MMOL/L (ref 136–145)
SP GR UR STRIP: 1.02 (ref 1–1.03)
TROPONIN I SERPL DL<=0.01 NG/ML-MCNC: 0.02 NG/ML (ref 0–0.03)
TROPONIN I SERPL DL<=0.01 NG/ML-MCNC: 0.03 NG/ML (ref 0–0.03)
URN SPEC COLLECT METH UR: ABNORMAL
UROBILINOGEN UR STRIP-ACNC: 1 EU/DL
WBC # BLD AUTO: 9.43 K/UL (ref 3.9–12.7)

## 2022-04-08 PROCEDURE — 99285 EMERGENCY DEPT VISIT HI MDM: CPT | Mod: 25

## 2022-04-08 PROCEDURE — 63600175 PHARM REV CODE 636 W HCPCS: Performed by: EMERGENCY MEDICINE

## 2022-04-08 PROCEDURE — 25000003 PHARM REV CODE 250: Performed by: EMERGENCY MEDICINE

## 2022-04-08 PROCEDURE — 80053 COMPREHEN METABOLIC PANEL: CPT | Performed by: EMERGENCY MEDICINE

## 2022-04-08 PROCEDURE — 93010 ELECTROCARDIOGRAM REPORT: CPT | Mod: ,,, | Performed by: INTERNAL MEDICINE

## 2022-04-08 PROCEDURE — 85025 COMPLETE CBC W/AUTO DIFF WBC: CPT | Performed by: EMERGENCY MEDICINE

## 2022-04-08 PROCEDURE — C9113 INJ PANTOPRAZOLE SODIUM, VIA: HCPCS | Performed by: EMERGENCY MEDICINE

## 2022-04-08 PROCEDURE — 96374 THER/PROPH/DIAG INJ IV PUSH: CPT

## 2022-04-08 PROCEDURE — 96361 HYDRATE IV INFUSION ADD-ON: CPT

## 2022-04-08 PROCEDURE — 93010 EKG 12-LEAD: ICD-10-PCS | Mod: ,,, | Performed by: INTERNAL MEDICINE

## 2022-04-08 PROCEDURE — 96375 TX/PRO/DX INJ NEW DRUG ADDON: CPT

## 2022-04-08 PROCEDURE — 84484 ASSAY OF TROPONIN QUANT: CPT | Mod: 91 | Performed by: EMERGENCY MEDICINE

## 2022-04-08 PROCEDURE — 93005 ELECTROCARDIOGRAM TRACING: CPT

## 2022-04-08 PROCEDURE — 83690 ASSAY OF LIPASE: CPT | Performed by: EMERGENCY MEDICINE

## 2022-04-08 PROCEDURE — 81003 URINALYSIS AUTO W/O SCOPE: CPT | Performed by: EMERGENCY MEDICINE

## 2022-04-08 RX ORDER — PANTOPRAZOLE SODIUM 40 MG/10ML
40 INJECTION, POWDER, LYOPHILIZED, FOR SOLUTION INTRAVENOUS
Status: COMPLETED | OUTPATIENT
Start: 2022-04-08 | End: 2022-04-08

## 2022-04-08 RX ORDER — MAG HYDROX/ALUMINUM HYD/SIMETH 200-200-20
30 SUSPENSION, ORAL (FINAL DOSE FORM) ORAL ONCE
Status: COMPLETED | OUTPATIENT
Start: 2022-04-08 | End: 2022-04-08

## 2022-04-08 RX ORDER — ASPIRIN 325 MG
325 TABLET ORAL
Status: COMPLETED | OUTPATIENT
Start: 2022-04-08 | End: 2022-04-08

## 2022-04-08 RX ORDER — PANTOPRAZOLE SODIUM 40 MG/1
40 TABLET, DELAYED RELEASE ORAL DAILY
Qty: 30 TABLET | Refills: 0 | Status: SHIPPED | OUTPATIENT
Start: 2022-04-08 | End: 2022-10-11

## 2022-04-08 RX ORDER — ONDANSETRON 2 MG/ML
4 INJECTION INTRAMUSCULAR; INTRAVENOUS
Status: COMPLETED | OUTPATIENT
Start: 2022-04-08 | End: 2022-04-08

## 2022-04-08 RX ORDER — PANTOPRAZOLE SODIUM 40 MG/1
40 TABLET, DELAYED RELEASE ORAL DAILY
Qty: 30 TABLET | Refills: 0 | Status: SHIPPED | OUTPATIENT
Start: 2022-04-08 | End: 2022-04-08 | Stop reason: SDUPTHER

## 2022-04-08 RX ORDER — LIDOCAINE HYDROCHLORIDE 20 MG/ML
10 SOLUTION OROPHARYNGEAL ONCE
Status: COMPLETED | OUTPATIENT
Start: 2022-04-08 | End: 2022-04-08

## 2022-04-08 RX ADMIN — MAGNESIUM HYDROXIDE/ALUMINUM HYDROXICE/SIMETHICONE 30 ML: 120; 1200; 1200 SUSPENSION ORAL at 06:04

## 2022-04-08 RX ADMIN — ASPIRIN 325 MG ORAL TABLET 325 MG: 325 PILL ORAL at 07:04

## 2022-04-08 RX ADMIN — ONDANSETRON 4 MG: 2 INJECTION INTRAMUSCULAR; INTRAVENOUS at 05:04

## 2022-04-08 RX ADMIN — SODIUM CHLORIDE, SODIUM LACTATE, POTASSIUM CHLORIDE, AND CALCIUM CHLORIDE 1000 ML: .6; .31; .03; .02 INJECTION, SOLUTION INTRAVENOUS at 05:04

## 2022-04-08 RX ADMIN — LIDOCAINE HYDROCHLORIDE 10 ML: 20 SOLUTION ORAL; TOPICAL at 06:04

## 2022-04-08 RX ADMIN — PANTOPRAZOLE SODIUM 40 MG: 40 INJECTION, POWDER, FOR SOLUTION INTRAVENOUS at 05:04

## 2022-04-08 NOTE — ED PROVIDER NOTES
SCRIBE #1 NOTE: I, Deven Gray, am scribing for, and in the presence of, Mina Blanco MD. I have scribed the entire note.       History     Chief Complaint   Patient presents with    Abdominal Pain     Pt presents to ed via aasi. C/o abdominal pain described as burning x 2 days. Pt has some nausea and vomiting today.      Review of patient's allergies indicates:   Allergen Reactions    Codeine          History of Present Illness     HPI    4/8/2022, 5:43 PM  History obtained from the patient      History of Present Illness: Bi Jessica is a 81 y.o. male patient with a PMHx of a-fib, DM, HLD, and HTN who presents to the Emergency Department for evaluation of abdominal pain which onset gradually last night. Pt reports a burning sensation in the LUQ of his abdomen with a burning CP. Symptoms are constant and moderate in severity. No mitigating or exacerbating factors reported. Associated sxs include nausea, vomiting, and constipation. Patient denies any fever, chills, SOB, diarrhea, weakness, and all other sxs at this time. Prior Tx includes a stool softener and gas relief medicine last night. Pt was able to have a small bowel movement today. No further complaints or concerns at this time.       Arrival mode: AASI    PCP: Thee Zarate MD        Past Medical History:  Past Medical History:   Diagnosis Date    Anxiety     Atrial fibrillation     BPH with obstruction/lower urinary tract symptoms     Depression     Diabetes mellitus type II, controlled     DM (diabetes mellitus)     BS doesn't check 02/22/2021    Hyperlipidemia associated with type 2 diabetes mellitus     Hypertension associated with diabetes        Past Surgical History:  Past Surgical History:   Procedure Laterality Date    CARDIOVERSION N/A 1/23/2019    Procedure: CARDIOVERSION;  Surgeon: Charbel Pandey MD;  Location: [facility] CATH [facility];  Service: Cardiology;  Laterality: N/A;    COLONOSCOPY N/A 5/1/2017    Procedure: COLONOSCOPY;   Surgeon: Vishal Andrade MD;  Location: [facility];  Service: Endoscopy;  Laterality: N/A;    TONSILLECTOMY           Family History:  Family History   Problem Relation Age of Onset    Heart disease Mother     Heart disease Father     Heart disease Brother     Cancer Brother        Social History:  Social History     Tobacco Use    Smoking status: Former Smoker    Smokeless tobacco: Never Used   Substance and Sexual Activity    Alcohol use: Yes     Alcohol/week: 3.0 standard drinks     Types: 3 Glasses of wine per week     Comment: occasionally     Drug use: No    Sexual activity: Not Currently     Partners: Female        Review of Systems     Review of Systems   Constitutional: Negative for chills and fever.   HENT: Negative for sore throat.    Respiratory: Negative for shortness of breath.    Cardiovascular: Positive for chest pain.   Gastrointestinal: Positive for abdominal pain (Burning pain in LUQ), constipation, nausea and vomiting. Negative for diarrhea.   Genitourinary: Negative for dysuria.   Musculoskeletal: Negative for back pain.   Skin: Negative for rash.   Neurological: Negative for weakness.   Hematological: Does not bruise/bleed easily.   All other systems reviewed and are negative.       Physical Exam     Initial Vitals [04/08/22 1734]   BP Pulse Resp Temp SpO2   (!) 128/51 97 18 98 °F (36.7 °C) 100 %      MAP       --          Physical Exam  Nursing Notes and Vital Signs Reviewed.  Constitutional: Patient is in no acute distress. Well-developed and well-nourished.  Head: Atraumatic. Normocephalic.  Eyes: EOM intact. Conjunctivae are not pale. No scleral icterus.  ENT: Mucous membranes are moist. Oropharynx is clear and symmetric.    Neck: Supple. Full ROM.   Cardiovascular: Regular rate. Regular rhythm. No murmurs, rubs, or gallops. Distal pulses are 2+ and symmetric.  Pulmonary/Chest: No respiratory distress. Clear to auscultation bilaterally. No wheezing or rales.  Abdominal: Soft and  "non-distended.  There is no tenderness.  No rebound, guarding, or rigidity.  Musculoskeletal: Moves all extremities. No obvious deformities. No edema.   Skin: Warm and dry.  Neurological:  Alert, awake, and appropriate.  Normal speech.  No acute focal neurological deficits are appreciated.  Psychiatric: Normal affect. Good eye contact. Appropriate in content.     ED Course   Procedures  ED Vital Signs:  Vitals:    04/08/22 1734 04/08/22 1744 04/08/22 1746 04/08/22 1804   BP: (!) 128/51 (!) 171/76  (!) 175/79   Pulse: 97 61 (S) 66 (!) 56   Resp: 18   (!) 22   Temp: 98 °F (36.7 °C)      TempSrc: Oral      SpO2: 100% 99%  100%   Weight: 93.9 kg (207 lb)      Height: 5' 11" (1.803 m)       04/08/22 1906   BP: (!) 144/65   Pulse: 62   Resp: (!) 23   Temp:    TempSrc:    SpO2: 97%   Weight:    Height:        Abnormal Lab Results:  Labs Reviewed   CBC W/ AUTO DIFFERENTIAL - Abnormal; Notable for the following components:       Result Value    MCH 31.3 (*)     Immature Granulocytes 0.6 (*)     Immature Grans (Abs) 0.06 (*)     All other components within normal limits   URINALYSIS, REFLEX TO URINE CULTURE - Abnormal; Notable for the following components:    Ketones, UA 1+ (*)     All other components within normal limits    Narrative:     Specimen Source->Urine   COMPREHENSIVE METABOLIC PANEL - Abnormal; Notable for the following components:    CO2 20 (*)     Glucose 155 (*)     Alkaline Phosphatase 50 (*)     eGFR if non  56 (*)     All other components within normal limits   TROPONIN I - Abnormal; Notable for the following components:    Troponin I 0.030 (*)     All other components within normal limits   LIPASE   TROPONIN I        All Lab Results:  Results for orders placed or performed during the hospital encounter of 04/08/22   CBC auto differential   Result Value Ref Range    WBC 9.43 3.90 - 12.70 K/uL    RBC 4.64 4.60 - 6.20 M/uL    Hemoglobin 14.5 14.0 - 18.0 g/dL    Hematocrit 42.7 40.0 - 54.0 %    " MCV 92 82 - 98 fL    MCH 31.3 (H) 27.0 - 31.0 pg    MCHC 34.0 32.0 - 36.0 g/dL    RDW 12.3 11.5 - 14.5 %    Platelets 213 150 - 450 K/uL    MPV 9.7 9.2 - 12.9 fL    Immature Granulocytes 0.6 (H) 0.0 - 0.5 %    Gran # (ANC) 6.8 1.8 - 7.7 K/uL    Immature Grans (Abs) 0.06 (H) 0.00 - 0.04 K/uL    Lymph # 1.8 1.0 - 4.8 K/uL    Mono # 0.8 0.3 - 1.0 K/uL    Eos # 0.1 0.0 - 0.5 K/uL    Baso # 0.04 0.00 - 0.20 K/uL    nRBC 0 0 /100 WBC    Gran % 71.6 38.0 - 73.0 %    Lymph % 18.8 18.0 - 48.0 %    Mono % 8.1 4.0 - 15.0 %    Eosinophil % 0.5 0.0 - 8.0 %    Basophil % 0.4 0.0 - 1.9 %    Differential Method Automated    Urinalysis, Reflex to Urine Culture Urine, Clean Catch    Specimen: Urine   Result Value Ref Range    Specimen UA Urine, Clean Catch     Color, UA Yellow Yellow, Straw, Bria    Appearance, UA Clear Clear    pH, UA 7.0 5.0 - 8.0    Specific Gravity, UA 1.020 1.005 - 1.030    Protein, UA Negative Negative    Glucose, UA Negative Negative    Ketones, UA 1+ (A) Negative    Bilirubin (UA) Negative Negative    Occult Blood UA Negative Negative    Nitrite, UA Negative Negative    Urobilinogen, UA 1.0 <2.0 EU/dL    Leukocytes, UA Negative Negative   Comprehensive metabolic panel   Result Value Ref Range    Sodium 137 136 - 145 mmol/L    Potassium 3.9 3.5 - 5.1 mmol/L    Chloride 106 95 - 110 mmol/L    CO2 20 (L) 23 - 29 mmol/L    Glucose 155 (H) 70 - 110 mg/dL    BUN 20 8 - 23 mg/dL    Creatinine 1.2 0.5 - 1.4 mg/dL    Calcium 9.5 8.7 - 10.5 mg/dL    Total Protein 6.9 6.0 - 8.4 g/dL    Albumin 3.6 3.5 - 5.2 g/dL    Total Bilirubin 0.9 0.1 - 1.0 mg/dL    Alkaline Phosphatase 50 (L) 55 - 135 U/L    AST 20 10 - 40 U/L    ALT 16 10 - 44 U/L    Anion Gap 11 8 - 16 mmol/L    eGFR if African American >60 >60 mL/min/1.73 m^2    eGFR if non African American 56 (A) >60 mL/min/1.73 m^2   Lipase   Result Value Ref Range    Lipase 32 4 - 60 U/L   Troponin I   Result Value Ref Range    Troponin I 0.030 (H) 0.000 - 0.026 ng/mL    Troponin I   Result Value Ref Range    Troponin I 0.021 0.000 - 0.026 ng/mL         Imaging Results:  Imaging Results          X-Ray Chest AP Portable (Final result)  Result time 04/08/22 18:40:30    Final result by Jules Zavala MD (04/08/22 18:40:30)                 Impression:      No acute abnormality identified.      Electronically signed by: Jules Zavala  Date:    04/08/2022  Time:    18:40             Narrative:    EXAMINATION:  XR CHEST AP PORTABLE    CLINICAL HISTORY:  Epigastric pain;    TECHNIQUE:  Single frontal view of the chest was performed.    COMPARISON:  Multiple priors.    FINDINGS:  The lungs are clear, with normal appearance of pulmonary vasculature and no pleural effusion or pneumothorax.    The cardiac silhouette is enlarged.  The hilar and mediastinal contours are unremarkable.    Bones are intact.                                 The EKG was ordered, reviewed, and independently interpreted by the ED provider.  Interpretation time: 17:47  Rate: 62 BPM  Rhythm: Sinus rhythm with 1st degree AV block with premature supraqventricular complexes  Interpretation: No ST or T wave abnormality. No STEMI.             The Emergency Provider reviewed the vital signs and test results, which are outlined above.     ED Discussion     7:55 PM: Discussed case with Charito Bourgeois with Hospital Medicine. Requested repeat troponin, and if elevated still can be admitted.     10:24 PM: Reassessed pt at this time. He is feeling better, repeat troponin is WNL. Discussed with pt all pertinent ED information and results. Discussed pt dx and plan of tx. Gave pt all f/u and return to the ED instructions. All questions and concerns were addressed at this time. Pt expresses understanding of information and instructions, and is comfortable with plan to discharge. Pt is stable for discharge.    I discussed with patient and/or family/caretaker that evaluation in the ED does not suggest any emergent or life threatening  medical conditions requiring immediate intervention beyond what was provided in the ED, and I believe patient is safe for discharge.  Regardless, an unremarkable evaluation in the ED does not preclude the development or presence of a serious of life threatening condition. As such, patient was instructed to return immediately for any worsening or change in current symptoms.         Medical Decision Making:   Clinical Tests:   Lab Tests: Ordered and Reviewed  Radiological Study: Ordered and Reviewed  Medical Tests: Ordered and Reviewed           ED Medication(s):  Medications   aluminum-magnesium hydroxide-simethicone 200-200-20 mg/5 mL suspension 30 mL (30 mLs Oral Given 4/8/22 1808)     And   LIDOcaine HCl 2% oral solution 10 mL (10 mLs Oral Given 4/8/22 1808)   pantoprazole injection 40 mg (40 mg Intravenous Given 4/8/22 1752)   ondansetron injection 4 mg (4 mg Intravenous Given 4/8/22 1752)   lactated ringers bolus 1,000 mL (0 mLs Intravenous Stopped 4/8/22 1852)   aspirin tablet 325 mg (325 mg Oral Given 4/8/22 1908)       New Prescriptions    PANTOPRAZOLE (PROTONIX) 40 MG TABLET    Take 1 tablet (40 mg total) by mouth once daily.        Follow-up Information     Charbel Pandey Md, MD. Schedule an appointment as soon as possible for a visit in 2 days.    Specialties: Cardiology, Internal Medicine  Why: For re-evaluation and further treatment  Contact information:  [id number]  [place] [id number] [phone]             [facility] - Emergency Dept.. Go today.    Specialty: Emergency Medicine  Why: If symptoms worsen, For re-evaluation and further treatment  Contact information:  [address] [facility] [address] [phone]           Thee Zarate MD In 2 days.    Specialty: Internal Medicine  Why: If symptoms worsen, For suture removal  Contact information:  [address]   [address]  [facility] [id number] [phone]                             Scribe Attestation:   Scribe  #1: I performed the above scribed service and the documentation accurately describes the services I performed. I attest to the accuracy of the note.     Attending:   Physician Attestation Statement for Scribe #1: I, Mina Blanco MD, personally performed the services described in this documentation, as scribed by Deven Gray, in my presence, and it is both accurate and complete.           Clinical Impression       ICD-10-CM ICD-9-CM   1. Troponin I above reference range  R77.8 790.6   2. Epigastric pain  R10.13 789.06       Disposition:   Disposition: Discharged  Condition: Stable         Mina Blanco MD  04/09/22 0358

## 2022-04-09 NOTE — ED NOTES
Pt reports abdominal discomfort / epigastric pain better,not totally relieved  Son at bedside, sr up x 2 , call light in reach

## 2022-04-18 ENCOUNTER — TELEPHONE (OUTPATIENT)
Dept: INTERNAL MEDICINE | Facility: CLINIC | Age: 82
End: 2022-04-18
Payer: MEDICARE

## 2022-04-18 NOTE — TELEPHONE ENCOUNTER
----- Message from Enedina Horn sent at 4/18/2022  3:49 PM CDT -----  .Type:  Patient Requesting Referral    Who Called: oscar/daughter  Does the patient already have the specialty appointment scheduled?: no  If yes, what is the date of that appointment?:  Referral to What Specialty:  Reason for Referral: having trouble sleeping  Does the patient want the referral with a specific physician?: no  [facility] the [facility]  Patient Requesting a Response?: yes  Would the patient rather a call back or a response via [facility]? call  Best Call Back Number: [phone]  Additional Information:

## 2022-04-18 NOTE — TELEPHONE ENCOUNTER
Pt's daughter states that pt is not sleeping at all. He is currently prescribed trazodone that is not helping. She is wanting to have a sleep study done. Please advise.

## 2022-04-18 NOTE — TELEPHONE ENCOUNTER
Patient daughter is stating he is not sleeping more than 4-5 hour a night . He is taking trazodone, melatonin, and some times tylenol pm .  Daughter to call back to make an appoinment when she can come in with patient

## 2022-04-18 NOTE — TELEPHONE ENCOUNTER
Tell the daughter that we do not do sleep studies for people because they are not sleeping we actually do sleep studies because people are sleeping too much

## 2022-05-16 ENCOUNTER — OFFICE VISIT (OUTPATIENT)
Dept: INTERNAL MEDICINE | Facility: CLINIC | Age: 82
End: 2022-05-16
Payer: MEDICARE

## 2022-05-16 VITALS
TEMPERATURE: 99 F | HEART RATE: 70 BPM | DIASTOLIC BLOOD PRESSURE: 76 MMHG | SYSTOLIC BLOOD PRESSURE: 140 MMHG | WEIGHT: 201.25 LBS | BODY MASS INDEX: 28.18 KG/M2 | HEIGHT: 71 IN | OXYGEN SATURATION: 97 %

## 2022-05-16 DIAGNOSIS — I15.2 HYPERTENSION ASSOCIATED WITH DIABETES: ICD-10-CM

## 2022-05-16 DIAGNOSIS — E11.69 HYPERLIPIDEMIA ASSOCIATED WITH TYPE 2 DIABETES MELLITUS: ICD-10-CM

## 2022-05-16 DIAGNOSIS — F41.9 ANXIETY: ICD-10-CM

## 2022-05-16 DIAGNOSIS — G47.9 SLEEP DISTURBANCE: Primary | ICD-10-CM

## 2022-05-16 DIAGNOSIS — E11.59 HYPERTENSION ASSOCIATED WITH DIABETES: ICD-10-CM

## 2022-05-16 DIAGNOSIS — E78.5 HYPERLIPIDEMIA ASSOCIATED WITH TYPE 2 DIABETES MELLITUS: ICD-10-CM

## 2022-05-16 DIAGNOSIS — E11.9 CONTROLLED TYPE 2 DIABETES MELLITUS WITHOUT COMPLICATION, WITHOUT LONG-TERM CURRENT USE OF INSULIN: ICD-10-CM

## 2022-05-16 DIAGNOSIS — F32.5 MAJOR DEPRESSIVE DISORDER IN FULL REMISSION, UNSPECIFIED WHETHER RECURRENT: ICD-10-CM

## 2022-05-16 PROCEDURE — 99999 PR PBB SHADOW E&M-EST. PATIENT-LVL IV: ICD-10-PCS | Mod: PBBFAC,,, | Performed by: INTERNAL MEDICINE

## 2022-05-16 PROCEDURE — 99213 OFFICE O/P EST LOW 20 MIN: CPT | Mod: S$PBB,,, | Performed by: INTERNAL MEDICINE

## 2022-05-16 PROCEDURE — 99999 PR PBB SHADOW E&M-EST. PATIENT-LVL IV: CPT | Mod: PBBFAC,,, | Performed by: INTERNAL MEDICINE

## 2022-05-16 PROCEDURE — 99214 OFFICE O/P EST MOD 30 MIN: CPT | Mod: PBBFAC,PO | Performed by: INTERNAL MEDICINE

## 2022-05-16 PROCEDURE — 99213 PR OFFICE/OUTPT VISIT, EST, LEVL III, 20-29 MIN: ICD-10-PCS | Mod: S$PBB,,, | Performed by: INTERNAL MEDICINE

## 2022-05-16 NOTE — PROGRESS NOTES
"HPI:  Patient is 82-year-old man who comes today accompanied by his daughter for discussion about his chronic complaints of poor sleep.  Patient has a very bad habit of taking daytime naps.  He does not sleep well at night because he has got disjointed and disconnected sleep pattern.  Patient continues to work at [employer] both day and mid days shifts.  He is always home by at least 930 in the evening.    Current meds have been verified and updated per the EMR  Exam:BP (!) 140/76 (BP Location: Left arm, Patient Position: Sitting, BP Method: Medium (Manual))   Pulse 70   Temp 99 °F (37.2 °C) (Temporal)   Ht 5' 11" (1.803 m)   Wt 91.3 kg (201 lb 4.5 oz)   SpO2 97%   BMI 28.07 kg/m²   Exam deferred    Lab Results   Component Value Date    WBC 9.43 04/08/2022    HGB 14.5 04/08/2022    HCT 42.7 04/08/2022     04/08/2022    CHOL 161 03/21/2022    TRIG 63 03/21/2022    HDL 61 03/21/2022    ALT 16 04/08/2022    AST 20 04/08/2022     04/08/2022    K 3.9 04/08/2022     04/08/2022    CREATININE 1.2 04/08/2022    BUN 20 04/08/2022    CO2 20 (L) 04/08/2022    TSH 1.787 03/21/2022    PSA 2.9 10/10/2017    INR 1.0 01/22/2019    HGBA1C 5.5 03/21/2022       Impression:  Sleep disturbance due to very poor sleep behavior  Patient Active Problem List   Diagnosis    Anxiety    Hyperlipidemia associated with type 2 diabetes mellitus    Hypertension associated with diabetes    Diabetes mellitus type II, controlled    Left inguinal hernia    History of colon polyps    BPH with obstruction/lower urinary tract symptoms    PAF (paroxysmal atrial fibrillation)    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 and get out of the bed at 7:00 a.m. every day of his life.  No daytime napping at all.  He may use 1 Tylenol p.m. in 110 mg of melatonin at bedtime and that is it.  He has been told it will take several months to retrain him " and get his biological clock adjusted to where he sleeps all night.    This note is generated with speech recognition software and is subject to transcription error and sound alike phrases that may be missed by proofreading.

## 2022-07-25 ENCOUNTER — OFFICE VISIT (OUTPATIENT)
Dept: OPHTHALMOLOGY | Facility: CLINIC | Age: 82
End: 2022-07-25
Payer: MEDICARE

## 2022-07-25 DIAGNOSIS — E11.9 TYPE 2 DIABETES MELLITUS WITHOUT RETINOPATHY: Primary | ICD-10-CM

## 2022-07-25 DIAGNOSIS — E11.36 DIABETIC CATARACT: ICD-10-CM

## 2022-07-25 DIAGNOSIS — H52.7 REFRACTIVE ERRORS: ICD-10-CM

## 2022-07-25 PROCEDURE — 92015 PR REFRACTION: ICD-10-PCS | Mod: ,,, | Performed by: OPTOMETRIST

## 2022-07-25 PROCEDURE — 99999 PR PBB SHADOW E&M-EST. PATIENT-LVL I: ICD-10-PCS | Mod: PBBFAC,,, | Performed by: OPTOMETRIST

## 2022-07-25 PROCEDURE — 92014 PR EYE EXAM, EST PATIENT,COMPREHESV: ICD-10-PCS | Mod: S$PBB,,, | Performed by: OPTOMETRIST

## 2022-07-25 PROCEDURE — 99999 PR PBB SHADOW E&M-EST. PATIENT-LVL I: CPT | Mod: PBBFAC,,, | Performed by: OPTOMETRIST

## 2022-07-25 PROCEDURE — 99211 OFF/OP EST MAY X REQ PHY/QHP: CPT | Mod: PBBFAC | Performed by: OPTOMETRIST

## 2022-07-25 PROCEDURE — 92015 DETERMINE REFRACTIVE STATE: CPT | Mod: ,,, | Performed by: OPTOMETRIST

## 2022-07-25 PROCEDURE — 92014 COMPRE OPH EXAM EST PT 1/>: CPT | Mod: S$PBB,,, | Performed by: OPTOMETRIST

## 2022-07-25 NOTE — PROGRESS NOTES
HPI     Diabetic Eye Exam      Additional comments: Lab Results       Component                Value               Date                       HGBA1C                   5.5                 03/21/2022                         Comments     No complaints.  Wears +2.50 readers.          Last edited by Darling Carrasquillo MA on 7/25/2022 10:27 AM. (History)            Assessment /Plan     For exam results, see Encounter Report.    Type 2 diabetes mellitus without retinopathy    Diabetic cataract    Refractive errors      No Background Diabetic Retinopathy    Moderate cataracts OU, not surgical  Defers cat eval.    Dispense Final Rx for glasses or may use OTC glasses.  RTC 1 year  Discussed above and answered questions.

## 2022-09-04 NOTE — TELEPHONE ENCOUNTER
Care Due:                  Date            Visit Type   Department     Provider  --------------------------------------------------------------------------------                                EP -                              PRIMARY      [facility] INTERNAL  Last Visit: 05-      CARE ([facility])   GAMAL Zarate                              [facility]                              PRIMARY      [facility] INTERNAL  Next Visit: 09-      CARE ([facility])   [facility]       Thee Zarate                                                            Last  Test          Frequency    Reason                     Performed    Due Date  --------------------------------------------------------------------------------    HBA1C.......  6 months...  metFORMIN................  03- 09-    [facility] Embedded Care Gaps. Reference number: [id number]. 9/04/2022   3:47:56 PM CDT

## 2022-09-05 RX ORDER — APIXABAN 5 MG/1
TABLET, FILM COATED ORAL
Qty: 180 TABLET | Refills: 3 | Status: SHIPPED | OUTPATIENT
Start: 2022-09-05 | End: 2023-08-21

## 2022-09-05 RX ORDER — SIMVASTATIN 40 MG/1
TABLET, FILM COATED ORAL
Qty: 90 TABLET | Refills: 1 | Status: SHIPPED | OUTPATIENT
Start: 2022-09-05 | End: 2023-02-19

## 2022-09-05 NOTE — TELEPHONE ENCOUNTER
Refill Routing Note   Medication(s) are not appropriate for processing by [facility] for the following reason(s):      - Outside of protocol    [facility] action(s):  Route  Approve Medication-related problems identified: Requires labs        Medication reconciliation completed: No     Appointments  past 12m or future 3m with PCP    Date Provider   Last Visit   5/16/2022 Thee Zarate MD   Next Visit   9/19/2022 Thee Zarate MD   ED visits in past 90 days: 0        Note composed:10:55 AM 09/05/2022

## 2022-09-12 ENCOUNTER — LAB VISIT (OUTPATIENT)
Dept: LAB | Facility: HOSPITAL | Age: 82
End: 2022-09-12
Attending: INTERNAL MEDICINE
Payer: MEDICARE

## 2022-09-12 DIAGNOSIS — E11.9 CONTROLLED TYPE 2 DIABETES MELLITUS WITHOUT COMPLICATION, WITHOUT LONG-TERM CURRENT USE OF INSULIN: ICD-10-CM

## 2022-09-12 LAB
ANION GAP SERPL CALC-SCNC: 11 MMOL/L (ref 8–16)
BUN SERPL-MCNC: 22 MG/DL (ref 8–23)
CALCIUM SERPL-MCNC: 9.9 MG/DL (ref 8.7–10.5)
CHLORIDE SERPL-SCNC: 107 MMOL/L (ref 95–110)
CHOLEST SERPL-MCNC: 148 MG/DL (ref 120–199)
CHOLEST/HDLC SERPL: 2.5 {RATIO} (ref 2–5)
CO2 SERPL-SCNC: 22 MMOL/L (ref 23–29)
CREAT SERPL-MCNC: 1 MG/DL (ref 0.5–1.4)
EST. GFR  (NO RACE VARIABLE): >60 ML/MIN/1.73 M^2
ESTIMATED AVG GLUCOSE: 111 MG/DL (ref 68–131)
GLUCOSE SERPL-MCNC: 102 MG/DL (ref 70–110)
HBA1C MFR BLD: 5.5 % (ref 4–5.6)
HDLC SERPL-MCNC: 59 MG/DL (ref 40–75)
HDLC SERPL: 39.9 % (ref 20–50)
LDLC SERPL CALC-MCNC: 80.4 MG/DL (ref 63–159)
NONHDLC SERPL-MCNC: 89 MG/DL
POTASSIUM SERPL-SCNC: 4.3 MMOL/L (ref 3.5–5.1)
SODIUM SERPL-SCNC: 140 MMOL/L (ref 136–145)
TRIGL SERPL-MCNC: 43 MG/DL (ref 30–150)
TSH SERPL DL<=0.005 MIU/L-ACNC: 1.42 UIU/ML (ref 0.4–4)

## 2022-09-12 PROCEDURE — 84443 ASSAY THYROID STIM HORMONE: CPT | Performed by: INTERNAL MEDICINE

## 2022-09-12 PROCEDURE — 80048 BASIC METABOLIC PNL TOTAL CA: CPT | Performed by: INTERNAL MEDICINE

## 2022-09-12 PROCEDURE — 80061 LIPID PANEL: CPT | Performed by: INTERNAL MEDICINE

## 2022-09-12 PROCEDURE — 83036 HEMOGLOBIN GLYCOSYLATED A1C: CPT | Performed by: INTERNAL MEDICINE

## 2022-09-12 PROCEDURE — 36415 COLL VENOUS BLD VENIPUNCTURE: CPT | Mod: PO | Performed by: INTERNAL MEDICINE

## 2022-09-19 ENCOUNTER — OFFICE VISIT (OUTPATIENT)
Dept: INTERNAL MEDICINE | Facility: CLINIC | Age: 82
End: 2022-09-19
Payer: MEDICARE

## 2022-09-19 VITALS
BODY MASS INDEX: 27.56 KG/M2 | WEIGHT: 196.88 LBS | SYSTOLIC BLOOD PRESSURE: 130 MMHG | TEMPERATURE: 98 F | HEIGHT: 71 IN | HEART RATE: 58 BPM | DIASTOLIC BLOOD PRESSURE: 80 MMHG | OXYGEN SATURATION: 98 %

## 2022-09-19 DIAGNOSIS — E11.69 HYPERLIPIDEMIA ASSOCIATED WITH TYPE 2 DIABETES MELLITUS: Primary | ICD-10-CM

## 2022-09-19 DIAGNOSIS — I15.2 HYPERTENSION ASSOCIATED WITH DIABETES: ICD-10-CM

## 2022-09-19 DIAGNOSIS — F32.5 MAJOR DEPRESSIVE DISORDER IN FULL REMISSION, UNSPECIFIED WHETHER RECURRENT: ICD-10-CM

## 2022-09-19 DIAGNOSIS — E11.59 HYPERTENSION ASSOCIATED WITH DIABETES: ICD-10-CM

## 2022-09-19 DIAGNOSIS — I48.0 PAF (PAROXYSMAL ATRIAL FIBRILLATION): ICD-10-CM

## 2022-09-19 DIAGNOSIS — I70.0 AORTIC ATHEROSCLEROSIS: ICD-10-CM

## 2022-09-19 DIAGNOSIS — E78.5 HYPERLIPIDEMIA ASSOCIATED WITH TYPE 2 DIABETES MELLITUS: Primary | ICD-10-CM

## 2022-09-19 DIAGNOSIS — N13.8 BPH WITH OBSTRUCTION/LOWER URINARY TRACT SYMPTOMS: ICD-10-CM

## 2022-09-19 DIAGNOSIS — Z86.010 HISTORY OF COLON POLYPS: ICD-10-CM

## 2022-09-19 DIAGNOSIS — E11.9 CONTROLLED TYPE 2 DIABETES MELLITUS WITHOUT COMPLICATION, WITHOUT LONG-TERM CURRENT USE OF INSULIN: ICD-10-CM

## 2022-09-19 DIAGNOSIS — N40.1 BPH WITH OBSTRUCTION/LOWER URINARY TRACT SYMPTOMS: ICD-10-CM

## 2022-09-19 PROCEDURE — 99214 OFFICE O/P EST MOD 30 MIN: CPT | Mod: S$PBB,,, | Performed by: INTERNAL MEDICINE

## 2022-09-19 PROCEDURE — 99999 PR PBB SHADOW E&M-EST. PATIENT-LVL IV: ICD-10-PCS | Mod: PBBFAC,,, | Performed by: INTERNAL MEDICINE

## 2022-09-19 PROCEDURE — 99214 OFFICE O/P EST MOD 30 MIN: CPT | Mod: PBBFAC,PO | Performed by: INTERNAL MEDICINE

## 2022-09-19 PROCEDURE — 99999 PR PBB SHADOW E&M-EST. PATIENT-LVL IV: CPT | Mod: PBBFAC,,, | Performed by: INTERNAL MEDICINE

## 2022-09-19 PROCEDURE — 99214 PR OFFICE/OUTPT VISIT, EST, LEVL IV, 30-39 MIN: ICD-10-PCS | Mod: S$PBB,,, | Performed by: INTERNAL MEDICINE

## 2022-09-19 NOTE — PROGRESS NOTES
"HPI:  Patient is 82-year-old man who comes today for follow-up of diabetes, hypertension and lipids.  He continues do very well.  He still complains of always feeling sleepy.  This is been a chronic issue.  There has been no change to it.  He denies any hypoglycemia.  His blood pressures been well controlled.    Current meds have been verified and updated per the EMR  Exam:/80 (BP Location: Left arm, Patient Position: Sitting, BP Method: Large (Manual))   Pulse (!) 58   Temp 97.6 °F (36.4 °C) (Tympanic)   Ht 5' 11" (1.803 m)   Wt 89.3 kg (196 lb 13.9 oz)   SpO2 98%   BMI 27.46 kg/m²   Carotids 2+ equal without bruits  Chest clear  Cardiovascular regular rate and rhythm without murmur gallop or rub  Extremities without edema    Lab Results   Component Value Date    WBC 9.43 04/08/2022    HGB 14.5 04/08/2022    HCT 42.7 04/08/2022     04/08/2022    CHOL 148 09/12/2022    TRIG 43 09/12/2022    HDL 59 09/12/2022    ALT 16 04/08/2022    AST 20 04/08/2022     09/12/2022    K 4.3 09/12/2022     09/12/2022    CREATININE 1.0 09/12/2022    BUN 22 09/12/2022    CO2 22 (L) 09/12/2022    TSH 1.419 09/12/2022    PSA 2.9 10/10/2017    INR 1.0 01/22/2019    HGBA1C 5.5 09/12/2022       Impression:  Multiple medical problems below, stable  Patient Active Problem List   Diagnosis    Anxiety    Hyperlipidemia associated with type 2 diabetes mellitus    Hypertension associated with diabetes    Diabetes mellitus type II, controlled    Left inguinal hernia    History of colon polyps    BPH with obstruction/lower urinary tract symptoms    PAF (paroxysmal atrial fibrillation)    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  He will be seen again in 6 months with above lab work.    This note is generated with " speech recognition software and is subject to transcription error and sound alike phrases that may be missed by proofreading.

## 2022-09-23 ENCOUNTER — TELEPHONE (OUTPATIENT)
Dept: ADMINISTRATIVE | Facility: HOSPITAL | Age: 82
End: 2022-09-23
Payer: MEDICARE

## 2022-10-11 ENCOUNTER — OFFICE VISIT (OUTPATIENT)
Dept: INTERNAL MEDICINE | Facility: CLINIC | Age: 82
End: 2022-10-11
Payer: MEDICARE

## 2022-10-11 VITALS
BODY MASS INDEX: 28.02 KG/M2 | RESPIRATION RATE: 18 BRPM | HEART RATE: 45 BPM | OXYGEN SATURATION: 98 % | DIASTOLIC BLOOD PRESSURE: 70 MMHG | WEIGHT: 200.19 LBS | TEMPERATURE: 98 F | SYSTOLIC BLOOD PRESSURE: 122 MMHG | HEIGHT: 71 IN

## 2022-10-11 DIAGNOSIS — E11.59 HYPERTENSION ASSOCIATED WITH DIABETES: ICD-10-CM

## 2022-10-11 DIAGNOSIS — F41.9 ANXIETY: ICD-10-CM

## 2022-10-11 DIAGNOSIS — N40.1 BPH WITH OBSTRUCTION/LOWER URINARY TRACT SYMPTOMS: ICD-10-CM

## 2022-10-11 DIAGNOSIS — G47.09 OTHER INSOMNIA: ICD-10-CM

## 2022-10-11 DIAGNOSIS — I15.2 HYPERTENSION ASSOCIATED WITH DIABETES: ICD-10-CM

## 2022-10-11 DIAGNOSIS — N13.8 BPH WITH OBSTRUCTION/LOWER URINARY TRACT SYMPTOMS: ICD-10-CM

## 2022-10-11 DIAGNOSIS — E11.69 HYPERLIPIDEMIA ASSOCIATED WITH TYPE 2 DIABETES MELLITUS: ICD-10-CM

## 2022-10-11 DIAGNOSIS — I48.0 PAF (PAROXYSMAL ATRIAL FIBRILLATION): ICD-10-CM

## 2022-10-11 DIAGNOSIS — F32.5 MAJOR DEPRESSIVE DISORDER IN FULL REMISSION, UNSPECIFIED WHETHER RECURRENT: ICD-10-CM

## 2022-10-11 DIAGNOSIS — I70.0 AORTIC ATHEROSCLEROSIS: ICD-10-CM

## 2022-10-11 DIAGNOSIS — E78.5 HYPERLIPIDEMIA ASSOCIATED WITH TYPE 2 DIABETES MELLITUS: ICD-10-CM

## 2022-10-11 DIAGNOSIS — E11.9 CONTROLLED TYPE 2 DIABETES MELLITUS WITHOUT COMPLICATION, WITHOUT LONG-TERM CURRENT USE OF INSULIN: ICD-10-CM

## 2022-10-11 DIAGNOSIS — Z00.00 ENCOUNTER FOR PREVENTIVE HEALTH EXAMINATION: Primary | ICD-10-CM

## 2022-10-11 PROCEDURE — 99999 PR PBB SHADOW E&M-EST. PATIENT-LVL V: ICD-10-PCS | Mod: PBBFAC,,, | Performed by: NURSE PRACTITIONER

## 2022-10-11 PROCEDURE — 99215 OFFICE O/P EST HI 40 MIN: CPT | Mod: PBBFAC,PO | Performed by: NURSE PRACTITIONER

## 2022-10-11 PROCEDURE — 99999 PR PBB SHADOW E&M-EST. PATIENT-LVL V: CPT | Mod: PBBFAC,,, | Performed by: NURSE PRACTITIONER

## 2022-10-11 PROCEDURE — G0008 ADMIN INFLUENZA VIRUS VAC: HCPCS | Mod: PBBFAC,PO

## 2022-10-11 PROCEDURE — G0439 PR MEDICARE ANNUAL WELLNESS SUBSEQUENT VISIT: ICD-10-PCS | Mod: ,,, | Performed by: NURSE PRACTITIONER

## 2022-10-11 PROCEDURE — G0439 PPPS, SUBSEQ VISIT: HCPCS | Mod: ,,, | Performed by: NURSE PRACTITIONER

## 2022-10-11 NOTE — PROGRESS NOTES
"  Bi Jessica presented for a  Medicare AWV and comprehensive Health Risk Assessment today. The following components were reviewed and updated:    Medical history  Family History  Social history  Allergies and Current Medications  Health Risk Assessment  Health Maintenance  Care Team         ** See Completed Assessments for Annual Wellness Visit within the encounter summary.**         The following assessments were completed:  Living Situation  CAGE  Depression Screening  Timed Get Up and Go  Whisper Test  Cognitive Function Screening    Nutrition Screening  ADL Screening  PAQ Screening        Vitals:    10/11/22 1048   BP: 122/70   Pulse: (!) 45   Resp: 18   Temp: 98 °F (36.7 °C)   TempSrc: Temporal   SpO2: 98%   Weight: 90.8 kg (200 lb 2.8 oz)   Height: 5' 11" (1.803 m)     Body mass index is 27.92 kg/m².  Physical Exam  Constitutional:       General: He is not in acute distress.     Appearance: Normal appearance. He is well-developed. He is not ill-appearing, toxic-appearing or diaphoretic.   HENT:      Head: Normocephalic and atraumatic.      Right Ear: Tympanic membrane normal.      Left Ear: Tympanic membrane normal.      Nose: Nose normal.      Mouth/Throat:      Mouth: Mucous membranes are moist.      Pharynx: No posterior oropharyngeal erythema.   Eyes:      Extraocular Movements: Extraocular movements intact.      Conjunctiva/sclera: Conjunctivae normal.   Neck:      Vascular: No carotid bruit.   Cardiovascular:      Rate and Rhythm: Normal rate and regular rhythm.      Pulses: Normal pulses.      Heart sounds: Normal heart sounds. No murmur heard.    No friction rub. No gallop.   Pulmonary:      Effort: Pulmonary effort is normal. No respiratory distress.      Breath sounds: Normal breath sounds. No stridor. No wheezing, rhonchi or rales.   Chest:      Chest wall: No tenderness.   Abdominal:      General: Bowel sounds are normal. There is no distension.      Palpations: Abdomen is soft. There is no mass. "      Tenderness: There is no abdominal tenderness.      Hernia: No hernia is present.   Musculoskeletal:         General: No tenderness. Normal range of motion.      Cervical back: Normal range of motion and neck supple. No tenderness.   Lymphadenopathy:      Cervical: No cervical adenopathy.   Skin:     General: Skin is warm and dry.   Neurological:      Mental Status: He is alert and oriented to person, place, and time.      Cranial Nerves: No cranial nerve deficit.   Psychiatric:         Mood and Affect: Mood normal.         Behavior: Behavior normal.             Diagnoses and health risks identified today and associated recommendations/orders:    1. Encounter for preventive health examination  Screenings performed, as noted above.  Personal preventative testing needs reviewed.      2. 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 Hyperlipidemia associated with type 2 diabetes mellitus  Treated with simvastatin and fish oil, stable LDL 80.4, follow up with pcp    4. Hypertension associated with diabetes  Treated with lisinopril, stable, follow up with pcp    5. Aortic atherosclerosis  See #3    6. BPH with obstruction/lower urinary tract symptoms  Monitored, unstable, gets up several times a night, please discuss this with your pcp    7. Controlled type 2 diabetes mellitus without complication, without long-term current use of insulin  Treated with metformin, stable, A1c 5.5, continue current meds, follow up with pcp    8. Anxiety  See #9    9. Other insomnia  Treats self with Tylenol pm, please discuss this with your pcp    10. PAF (paroxysmal atrial fibrillation)  Treated with eliquis, stable, heart rhythm regular today, follow up with cardiology    Had a flu shot today, no opioids no sub abuse      Provided Bi with a 5-10 year written screening  schedule and personal prevention plan. Recommendations were developed using the USPSTF age appropriate recommendations. Education, counseling, and referrals were provided as needed. After Visit Summary printed and given to patient which includes a list of additional screenings\tests needed.    No follow-ups on file.    Dallin Jacome, NP    I offered to discuss advanced care planning, including how to pick a person who would make decisions for you if you were unable to make them for yourself, called a health care power of , and what kind of decisions you might make such as use of life sustaining treatments such as ventilators and tube feeding when faced with a life limiting illness recorded on a living will that they will need to know. (How you want to be cared for as you near the end of your natural life)     X Patient is interested in learning more about how to make advanced directives.  I provided them paperwork and offered to discuss this with them.

## 2022-10-11 NOTE — PATIENT INSTRUCTIONS
Counseling and Referral of Other Preventative  (Italic type indicates deductible and co-insurance are waived)    Patient Name: Bi Jessica  Today's Date: 10/11/2022    Health Maintenance       Date Due Completion Date    COVID-19 Vaccine (1) Never done ---    Hemoglobin A1c 03/12/2023 9/12/2022    Diabetes Urine Screening 03/21/2023 3/21/2022    Eye Exam 07/25/2023 7/25/2022    Lipid Panel 09/12/2023 9/12/2022    TETANUS VACCINE 01/22/2029 1/22/2019        Orders Placed This Encounter   Procedures    Influenza - Quadrivalent (Adjuvanted)       The following information is provided to all patients.  This information is to help you find resources for any of the problems found today that may be affecting your health:                Living healthy guide: www.[facility].[place].gov      Understanding Diabetes: www.diabetes.org      Eating healthy: www.cdc.gov/healthyweight      [facility] home safety checklist: www.cdc.gov/steadi/patient.html      Agency on Aging: www.goea.[place].[facility]      Alcoholics anonymous (AA): www.aa.org      Physical Activity: www.[place].nih.gov/zy6aggn      Tobacco use: www.quitwithusla.org

## 2022-10-17 ENCOUNTER — TELEPHONE (OUTPATIENT)
Dept: INTERNAL MEDICINE | Facility: CLINIC | Age: 82
End: 2022-10-17
Payer: MEDICARE

## 2022-10-17 NOTE — TELEPHONE ENCOUNTER
Daughter will come to drop off paperwork to have scanned into pts system/ she will come with pt/ done

## 2022-10-17 NOTE — TELEPHONE ENCOUNTER
----- Message from Be Niño sent at 10/17/2022 10:17 AM CDT -----  Contact: [phone]  Calling asking if she can stop by to scan in some documents (power of ) or will she need to schedule an appt . Please call back at [phone] . Thanks/ilan

## 2022-11-16 ENCOUNTER — HOSPITAL ENCOUNTER (EMERGENCY)
Facility: HOSPITAL | Age: 82
Discharge: HOME | End: 2022-11-16
Attending: FAMILY MEDICINE
Payer: MEDICARE

## 2022-11-16 VITALS
DIASTOLIC BLOOD PRESSURE: 82 MMHG | TEMPERATURE: 98 F | HEART RATE: 68 BPM | SYSTOLIC BLOOD PRESSURE: 149 MMHG | OXYGEN SATURATION: 100 % | RESPIRATION RATE: 20 BRPM

## 2022-11-16 DIAGNOSIS — R53.1 WEAKNESS: Primary | ICD-10-CM

## 2022-11-16 LAB
ALBUMIN SERPL BCP-MCNC: 3.8 G/DL (ref 3.5–5.2)
ALP SERPL-CCNC: 60 U/L (ref 55–135)
ALT SERPL W/O P-5'-P-CCNC: 27 U/L (ref 10–44)
ANION GAP SERPL CALC-SCNC: 13 MMOL/L (ref 8–16)
AST SERPL-CCNC: 31 U/L (ref 10–40)
BASOPHILS # BLD AUTO: 0.02 K/UL (ref 0–0.2)
BASOPHILS NFR BLD: 0.4 % (ref 0–1.9)
BILIRUB SERPL-MCNC: 0.9 MG/DL (ref 0.1–1)
BUN SERPL-MCNC: 15 MG/DL (ref 8–23)
CALCIUM SERPL-MCNC: 9.8 MG/DL (ref 8.7–10.5)
CHLORIDE SERPL-SCNC: 107 MMOL/L (ref 95–110)
CO2 SERPL-SCNC: 19 MMOL/L (ref 23–29)
CREAT SERPL-MCNC: 0.9 MG/DL (ref 0.5–1.4)
DIFFERENTIAL METHOD: ABNORMAL
EOSINOPHIL # BLD AUTO: 0 K/UL (ref 0–0.5)
EOSINOPHIL NFR BLD: 0.5 % (ref 0–8)
ERYTHROCYTE [DISTWIDTH] IN BLOOD BY AUTOMATED COUNT: 11.9 % (ref 11.5–14.5)
EST. GFR  (NO RACE VARIABLE): >60 ML/MIN/1.73 M^2
GLUCOSE SERPL-MCNC: 107 MG/DL (ref 70–110)
HCT VFR BLD AUTO: 45.6 % (ref 40–54)
HCV AB SERPL QL IA: NEGATIVE
HGB BLD-MCNC: 15.8 G/DL (ref 14–18)
HIV 1+2 AB+HIV1 P24 AG SERPL QL IA: NEGATIVE
IMM GRANULOCYTES # BLD AUTO: 0.02 K/UL (ref 0–0.04)
IMM GRANULOCYTES NFR BLD AUTO: 0.4 % (ref 0–0.5)
LYMPHOCYTES # BLD AUTO: 1.2 K/UL (ref 1–4.8)
LYMPHOCYTES NFR BLD: 22.5 % (ref 18–48)
MCH RBC QN AUTO: 31.7 PG (ref 27–31)
MCHC RBC AUTO-ENTMCNC: 34.6 G/DL (ref 32–36)
MCV RBC AUTO: 91 FL (ref 82–98)
MONOCYTES # BLD AUTO: 0.6 K/UL (ref 0.3–1)
MONOCYTES NFR BLD: 10.3 % (ref 4–15)
NEUTROPHILS # BLD AUTO: 3.6 K/UL (ref 1.8–7.7)
NEUTROPHILS NFR BLD: 65.9 % (ref 38–73)
NRBC BLD-RTO: 0 /100 WBC
PLATELET # BLD AUTO: 137 K/UL (ref 150–450)
PMV BLD AUTO: 9.6 FL (ref 9.2–12.9)
POTASSIUM SERPL-SCNC: 4.1 MMOL/L (ref 3.5–5.1)
PROT SERPL-MCNC: 8.1 G/DL (ref 6–8.4)
RBC # BLD AUTO: 4.99 M/UL (ref 4.6–6.2)
SODIUM SERPL-SCNC: 139 MMOL/L (ref 136–145)
WBC # BLD AUTO: 5.46 K/UL (ref 3.9–12.7)

## 2022-11-16 PROCEDURE — 87389 HIV-1 AG W/HIV-1&-2 AB AG IA: CPT | Performed by: FAMILY MEDICINE

## 2022-11-16 PROCEDURE — 86803 HEPATITIS C AB TEST: CPT | Performed by: FAMILY MEDICINE

## 2022-11-16 PROCEDURE — 85025 COMPLETE CBC W/AUTO DIFF WBC: CPT | Performed by: FAMILY MEDICINE

## 2022-11-16 PROCEDURE — 99283 EMERGENCY DEPT VISIT LOW MDM: CPT

## 2022-11-16 PROCEDURE — 80053 COMPREHEN METABOLIC PANEL: CPT | Performed by: FAMILY MEDICINE

## 2022-11-17 NOTE — ED PROVIDER NOTES
Encounter Date: 11/16/2022       History     Chief Complaint   Patient presents with    Extremity Weakness     [facility] reports the pt. Is complaining of weakness due to sleeping in his cold house. Pt. Was ambulatory on scene.      HPI  Review of patient's allergies indicates:   Allergen Reactions    Codeine      Past Medical History:   Diagnosis Date    Anxiety     Atrial fibrillation     BPH with obstruction/lower urinary tract symptoms     Depression     Diabetes mellitus type II, controlled     DM (diabetes mellitus)     BS doesn't check 02/22/2021    Hyperlipidemia associated with type 2 diabetes mellitus     Hypertension associated with diabetes      Past Surgical History:   Procedure Laterality Date    CARDIOVERSION N/A 1/23/2019    Procedure: CARDIOVERSION;  Surgeon: Charbel Pandey MD;  Location: [facility] CATH LAB;  Service: Cardiology;  Laterality: N/A;    COLONOSCOPY N/A 5/1/2017    Procedure: COLONOSCOPY;  Surgeon: Vishal Andrade MD;  Location: [facility] ENDO;  Service: Endoscopy;  Laterality: N/A;    TONSILLECTOMY       Family History   Problem Relation Age of Onset    Heart disease Mother     Heart disease Father     Heart disease Brother     Cancer Brother      Social History     Tobacco Use    Smoking status: Former    Smokeless tobacco: Never   Substance Use Topics    Alcohol use: Yes     Alcohol/week: 3.0 standard drinks     Types: 3 Glasses of wine per week     Comment: occasionally     Drug use: No     Review of Systems    Physical Exam     Initial Vitals   BP Pulse Resp Temp SpO2   11/16/22 1527 11/16/22 1527 11/16/22 1527 11/16/22 1531 11/16/22 1527   (!) 148/90 70 16 98.3 °F (36.8 °C) 98 %      MAP       --                Physical Exam    ED Course   Procedures  Labs Reviewed   CBC W/ AUTO DIFFERENTIAL - Abnormal; Notable for the following components:       Result Value    MCH 31.7 (*)     Platelets 137 (*)     All other components within normal limits   COMPREHENSIVE METABOLIC PANEL - Abnormal; Notable for  the following components:    CO2 19 (*)     All other components within normal limits   HIV 1 / 2 ANTIBODY    Narrative:     Release to patient->Immediate   HEPATITIS C ANTIBODY    Narrative:     Release to patient->Immediate          Imaging Results    None          Medications - No data to display                    Spoke with daughter Huong who lives in [place], [place] and says patient refuses to allow anyone to put heat in his house.  His son, lives around the corner from him and they check on him daily.  I relayed his normal bloodwork/vitals today and that he would be d/c'd to home.       Clinical Impression:   Final diagnoses:  [R53.1] Weakness (Primary)      ED Disposition Condition    Discharge Stable          ED Prescriptions    None       Follow-up Information       Follow up With Specialties Details Why Contact Info    Thee Zarate MD Internal Medicine Schedule an appointment as soon as possible for a visit  As needed [address]  [address]  [facility] [address]  [phone]               Hermila Morales MD  11/18/22 1019       Hermila Morales MD  12/09/22 1762

## 2022-11-17 NOTE — ED PROVIDER NOTES
SCRIBE #1 NOTE: I, Nishant Lozano, am scribing for, and in the presence of, Hermila Morales MD. I have scribed the entire note.       History     Chief Complaint   Patient presents with    Extremity Weakness     [facility] reports the pt. Is complaining of weakness due to sleeping in his cold house. Pt. Was ambulatory on scene.      Review of patient's allergies indicates:   Allergen Reactions    Codeine          History of Present Illness     HPI    11/16/2022, 7:07 PM  History obtained from the patient      History of Present Illness: iB Jessica is a 82 y.o. male patient with a PMHx of anxiety, T2DM, afib who presents to the Emergency Department for evaluation of weakness which onset gradually today. Pt states he has been sleeping without any heat in his house. Symptoms are constant and moderate in severity. No mitigating or exacerbating factors reported. No associated sxs reported. Patient denies any numbness, CP, SOB, abdominal pain, n/v/d, LOC, HA, and all other sxs at this time. No prior Tx reported. No further complaints or concerns at this time.         Arrival mode: AAS    PCP: Thee Zarate MD        Past Medical History:  Past Medical History:   Diagnosis Date    Anxiety     Atrial fibrillation     BPH with obstruction/lower urinary tract symptoms     Depression     Diabetes mellitus type II, controlled     DM (diabetes mellitus)     BS doesn't check 02/22/2021    Hyperlipidemia associated with type 2 diabetes mellitus     Hypertension associated with diabetes        Past Surgical History:  Past Surgical History:   Procedure Laterality Date    CARDIOVERSION N/A 1/23/2019    Procedure: CARDIOVERSION;  Surgeon: Charbel Pandey MD;  Location: [facility] CATH LAB;  Service: Cardiology;  Laterality: N/A;    COLONOSCOPY N/A 5/1/2017    Procedure: COLONOSCOPY;  Surgeon: Vishal Andrade MD;  Location: [facility] ENDO;  Service: Endoscopy;  Laterality: N/A;    TONSILLECTOMY           Family History:  Family History   Problem  Relation Age of Onset    Heart disease Mother     Heart disease Father     Heart disease Brother     Cancer Brother        Social History:  Social History     Tobacco Use    Smoking status: Former    Smokeless tobacco: Never   Substance and Sexual Activity    Alcohol use: Yes     Alcohol/week: 3.0 standard drinks     Types: 3 Glasses of wine per week     Comment: occasionally     Drug use: No    Sexual activity: Not Currently     Partners: Female        Review of Systems     Review of Systems   Constitutional:  Negative for fever.   HENT:  Negative for sore throat.    Respiratory:  Negative for shortness of breath.    Cardiovascular:  Negative for chest pain.   Gastrointestinal:  Negative for nausea.   Genitourinary:  Negative for dysuria.   Musculoskeletal:  Negative for back pain.   Skin:  Negative for rash.   Neurological:  Positive for weakness (Extremity). Negative for dizziness, syncope, numbness and headaches.   Hematological:  Does not bruise/bleed easily.   All other systems reviewed and are negative.     Physical Exam     Initial Vitals   BP Pulse Resp Temp SpO2   11/16/22 1527 11/16/22 1527 11/16/22 1527 11/16/22 1531 11/16/22 1527   (!) 148/90 70 16 98.3 °F (36.8 °C) 98 %      MAP       --                 Physical Exam   Nursing Notes and Vital Signs Reviewed.  Constitutional: Patient is in no acute distress. Well-developed and well-nourished.  Head: Atraumatic. Normocephalic.  Eyes: PERRL. EOM intact. Conjunctivae are not pale. No scleral icterus.  ENT: Mucous membranes are moist. Oropharynx is clear and symmetric.    Neck: Supple. Full ROM. No lymphadenopathy.  Cardiovascular: Regular rate. Regular rhythm. No murmurs, rubs, or gallops. Distal pulses are 2+ and symmetric.  Pulmonary/Chest: No respiratory distress. Clear to auscultation bilaterally. No wheezing or rales.  Abdominal: Soft and non-distended.  There is no tenderness.  No rebound, guarding, or rigidity. Good bowel sounds.  Genitourinary: No  CVA tenderness  Musculoskeletal: Moves all extremities. No obvious deformities. No edema. No calf tenderness.  Skin: Warm and dry.  Neurological:  Alert, awake, and appropriate.  Normal speech.  No acute focal neurological deficits are appreciated.  Psychiatric: Normal affect. Good eye contact. Appropriate in content.     ED Course   Procedures  ED Vital Signs:  Vitals:    11/16/22 1527 11/16/22 1531 11/16/22 1806 11/16/22 1900   BP: (!) 148/90  135/76 (!) 149/82   Pulse: 70  69 68   Resp: 16  20    Temp:  98.3 °F (36.8 °C) 98.2 °F (36.8 °C)    TempSrc:  Oral Oral    SpO2: 98%  97% 100%       Abnormal Lab Results:  Labs Reviewed   CBC W/ AUTO DIFFERENTIAL - Abnormal; Notable for the following components:       Result Value    MCH 31.7 (*)     Platelets 137 (*)     All other components within normal limits   COMPREHENSIVE METABOLIC PANEL - Abnormal; Notable for the following components:    CO2 19 (*)     All other components within normal limits   HIV 1 / 2 ANTIBODY    Narrative:     Release to patient->Immediate   HEPATITIS C ANTIBODY    Narrative:     Release to patient->Immediate        All Lab Results:  Results for orders placed or performed during the hospital encounter of 11/16/22   CBC auto differential   Result Value Ref Range    WBC 5.46 3.90 - 12.70 K/uL    RBC 4.99 4.60 - 6.20 M/uL    Hemoglobin 15.8 14.0 - 18.0 g/dL    Hematocrit 45.6 40.0 - 54.0 %    MCV 91 82 - 98 fL    MCH 31.7 (H) 27.0 - 31.0 pg    MCHC 34.6 32.0 - 36.0 g/dL    RDW 11.9 11.5 - 14.5 %    Platelets 137 (L) 150 - 450 K/uL    MPV 9.6 9.2 - 12.9 fL    Immature Granulocytes 0.4 0.0 - 0.5 %    Gran # (ANC) 3.6 1.8 - 7.7 K/uL    Immature Grans (Abs) 0.02 0.00 - 0.04 K/uL    Lymph # 1.2 1.0 - 4.8 K/uL    Mono # 0.6 0.3 - 1.0 K/uL    Eos # 0.0 0.0 - 0.5 K/uL    Baso # 0.02 0.00 - 0.20 K/uL    nRBC 0 0 /100 WBC    Gran % 65.9 38.0 - 73.0 %    Lymph % 22.5 18.0 - 48.0 %    Mono % 10.3 4.0 - 15.0 %    Eosinophil % 0.5 0.0 - 8.0 %    Basophil % 0.4  0.0 - 1.9 %    Differential Method Automated    Comprehensive metabolic panel   Result Value Ref Range    Sodium 139 136 - 145 mmol/L    Potassium 4.1 3.5 - 5.1 mmol/L    Chloride 107 95 - 110 mmol/L    CO2 19 (L) 23 - 29 mmol/L    Glucose 107 70 - 110 mg/dL    BUN 15 8 - 23 mg/dL    Creatinine 0.9 0.5 - 1.4 mg/dL    Calcium 9.8 8.7 - 10.5 mg/dL    Total Protein 8.1 6.0 - 8.4 g/dL    Albumin 3.8 3.5 - 5.2 g/dL    Total Bilirubin 0.9 0.1 - 1.0 mg/dL    Alkaline Phosphatase 60 55 - 135 U/L    AST 31 10 - 40 U/L    ALT 27 10 - 44 U/L    Anion Gap 13 8 - 16 mmol/L    eGFR >60 >60 mL/min/1.73 m^2   HIV 1/2 Ag/Ab (4th Gen)   Result Value Ref Range    HIV 1/2 Ag/Ab Negative Negative   Hepatitis C Antibody   Result Value Ref Range    Hepatitis C Ab Negative Negative         Imaging Results:  Imaging Results    None            The Emergency Provider reviewed the vital signs and test results, which are outlined above.     ED Discussion     7:02PM: Spoke with daughter Huong who lives in [place], [place] and says patient refuses to allow anyone to put heat in his house.  His son, lives around the corner from him and they check on him daily.  I relayed his normal bloodwork/vitals today and that he would be d/c'd to home.    7:07 PM: Reassessed pt at this time. Discussed with pt all pertinent ED information and results. Discussed pt dx and plan of tx. Gave pt all f/u and return to the ED instructions. All questions and concerns were addressed at this time. Pt expresses understanding of information and instructions, and is comfortable with plan to discharge. Pt is stable for discharge.    I discussed with patient and/or family/caretaker that evaluation in the ED does not suggest any emergent or life threatening medical conditions requiring immediate intervention beyond what was provided in the ED, and I believe patient is safe for discharge.  Regardless, an unremarkable evaluation in the ED does not preclude the development or  presence of a serious of life threatening condition. As such, patient was instructed to return immediately for any worsening or change in current symptoms.         Medical Decision Making:   Clinical Tests:   Lab Tests: Ordered and Reviewed         ED Medication(s):  Medications - No data to display    Discharge Medication List as of 11/16/2022  7:14 PM           Follow-up Information       Schedule an appointment as soon as possible for a visit  with Thee Zarate MD.    Specialty: Internal Medicine  Why: As needed  Contact information:  [address]   [address]  [facility] [address]  [phone]                                 Scribe Attestation:   Scribe #1: I performed the above scribed service and the documentation accurately describes the services I performed. I attest to the accuracy of the note.     Attending:   Physician Attestation Statement for Scribe #1: I, Hermila Morales MD, personally performed the services described in this documentation, as scribed by Nishant Lozano, in my presence, and it is both accurate and complete.           Clinical Impression       ICD-10-CM ICD-9-CM   1. Weakness  R53.1 780.79       Disposition:   Disposition: Discharged  Condition: Stable       Hermila Morales MD  12/09/22 1643

## 2022-12-12 ENCOUNTER — OFFICE VISIT (OUTPATIENT)
Dept: INTERNAL MEDICINE | Facility: CLINIC | Age: 82
End: 2022-12-12
Payer: MEDICARE

## 2022-12-12 VITALS
WEIGHT: 194.69 LBS | SYSTOLIC BLOOD PRESSURE: 136 MMHG | OXYGEN SATURATION: 97 % | TEMPERATURE: 99 F | DIASTOLIC BLOOD PRESSURE: 70 MMHG | HEART RATE: 56 BPM | HEIGHT: 71 IN | BODY MASS INDEX: 27.26 KG/M2 | RESPIRATION RATE: 20 BRPM

## 2022-12-12 DIAGNOSIS — J06.9 VIRAL URI WITH COUGH: Primary | ICD-10-CM

## 2022-12-12 PROCEDURE — 96372 THER/PROPH/DIAG INJ SC/IM: CPT | Mod: PBBFAC,PO

## 2022-12-12 PROCEDURE — 99213 OFFICE O/P EST LOW 20 MIN: CPT | Mod: 25,S$PBB,, | Performed by: INTERNAL MEDICINE

## 2022-12-12 PROCEDURE — 99999 PR PBB SHADOW E&M-EST. PATIENT-LVL III: ICD-10-PCS | Mod: PBBFAC,,, | Performed by: INTERNAL MEDICINE

## 2022-12-12 PROCEDURE — 99213 PR OFFICE/OUTPT VISIT, EST, LEVL III, 20-29 MIN: ICD-10-PCS | Mod: 25,S$PBB,, | Performed by: INTERNAL MEDICINE

## 2022-12-12 PROCEDURE — 99999 PR PBB SHADOW E&M-EST. PATIENT-LVL III: CPT | Mod: PBBFAC,,, | Performed by: INTERNAL MEDICINE

## 2022-12-12 PROCEDURE — 99213 OFFICE O/P EST LOW 20 MIN: CPT | Mod: PBBFAC,25,PO | Performed by: INTERNAL MEDICINE

## 2022-12-12 RX ORDER — PROMETHAZINE HYDROCHLORIDE AND DEXTROMETHORPHAN HYDROBROMIDE 6.25; 15 MG/5ML; MG/5ML
5 SYRUP ORAL EVERY 4 HOURS PRN
Qty: 240 ML | Refills: 1 | Status: SHIPPED | OUTPATIENT
Start: 2022-12-12 | End: 2022-12-22

## 2022-12-12 RX ORDER — METHYLPREDNISOLONE ACETATE 80 MG/ML
80 INJECTION, SUSPENSION INTRA-ARTICULAR; INTRALESIONAL; INTRAMUSCULAR; SOFT TISSUE
Status: COMPLETED | OUTPATIENT
Start: 2022-12-12 | End: 2022-12-12

## 2022-12-12 RX ADMIN — METHYLPREDNISOLONE ACETATE 80 MG: 80 INJECTION, SUSPENSION INTRALESIONAL; INTRAMUSCULAR; INTRASYNOVIAL; SOFT TISSUE at 04:12

## 2022-12-12 NOTE — PROGRESS NOTES
Administered Methylprednisolone injection into the right ventrogluteal. .  Patient tolerated well, no adverse reaction noted. Advise 15 min wait.

## 2022-12-12 NOTE — PROGRESS NOTES
"HPI:  Patient is 82-year-old man who comes today with complaints of chest congestion for last week.  He denies any fever.  Denies any sputum production.  He has been using over-the-counter Mucinex DM    Current meds have been verified and updated per the EMR  Exam:/70 (BP Location: Right arm, Patient Position: Sitting, BP Method: Large (Manual))   Pulse (!) 56   Temp 98.8 °F (37.1 °C) (Tympanic)   Resp 20   Ht 5' 11" (1.803 m)   Wt 88.3 kg (194 lb 10.7 oz)   SpO2 97%   BMI 27.15 kg/m²   TMs normal, throat shows no erythema exudate, neck is supple without adenopathy  Chest clear    Lab Results   Component Value Date    WBC 5.46 11/16/2022    HGB 15.8 11/16/2022    HCT 45.6 11/16/2022     (L) 11/16/2022    CHOL 148 09/12/2022    TRIG 43 09/12/2022    HDL 59 09/12/2022    ALT 27 11/16/2022    AST 31 11/16/2022     11/16/2022    K 4.1 11/16/2022     11/16/2022    CREATININE 0.9 11/16/2022    BUN 15 11/16/2022    CO2 19 (L) 11/16/2022    TSH 1.419 09/12/2022    PSA 2.9 10/10/2017    INR 1.0 01/22/2019    HGBA1C 5.5 09/12/2022       Impression:  Viral URI with cough  Patient Active Problem List   Diagnosis    Anxiety    Hyperlipidemia associated with type 2 diabetes mellitus    Hypertension associated with diabetes    Diabetes mellitus type II, controlled    Left inguinal hernia    History of colon polyps    BPH with obstruction/lower urinary tract symptoms    PAF (paroxysmal atrial fibrillation)    Major depression in full remission    Aortic atherosclerosis    Other insomnia       Plan:  Orders Placed This Encounter    methylPREDNISolone acetate injection 80 mg    promethazine-dextromethorphan (PROMETHAZINE-DM) 6.25-15 mg/5 mL Syrp     He was given 1 cc Depo-Medrol 80 and placed on promethazine doing cough syrup.  May continue with Mucinex DM.  He should force fluids.    This note is generated with speech recognition software and is subject to transcription error and sound alike phrases " that may be missed by proofreading.

## 2023-05-16 ENCOUNTER — TELEPHONE (OUTPATIENT)
Dept: INTERNAL MEDICINE | Facility: CLINIC | Age: 83
End: 2023-05-16
Payer: MEDICARE

## 2023-05-16 NOTE — TELEPHONE ENCOUNTER
Spoke with Huong enrique and verbalized to an appt on 06/05/23 with Dr. ERNESTO Zarate for annual and mental health check up

## 2023-05-16 NOTE — TELEPHONE ENCOUNTER
----- Message from Eneida Prakash sent at 5/16/2023  2:15 PM CDT -----  Pt's daughter would like a call back regarding her father. His health is declining and she is concerned. She will be available after 4:00 or tomorrow. Call Huong back at [phone]. Thx. EL

## 2023-06-05 ENCOUNTER — OFFICE VISIT (OUTPATIENT)
Dept: INTERNAL MEDICINE | Facility: CLINIC | Age: 83
End: 2023-06-05
Payer: MEDICARE

## 2023-06-05 VITALS
HEART RATE: 46 BPM | TEMPERATURE: 97 F | RESPIRATION RATE: 20 BRPM | HEIGHT: 68 IN | WEIGHT: 200.19 LBS | SYSTOLIC BLOOD PRESSURE: 138 MMHG | OXYGEN SATURATION: 98 % | DIASTOLIC BLOOD PRESSURE: 70 MMHG | BODY MASS INDEX: 30.34 KG/M2

## 2023-06-05 DIAGNOSIS — E11.59 HYPERTENSION ASSOCIATED WITH DIABETES: ICD-10-CM

## 2023-06-05 DIAGNOSIS — E11.9 CONTROLLED TYPE 2 DIABETES MELLITUS WITHOUT COMPLICATION, WITHOUT LONG-TERM CURRENT USE OF INSULIN: ICD-10-CM

## 2023-06-05 DIAGNOSIS — E78.5 HYPERLIPIDEMIA ASSOCIATED WITH TYPE 2 DIABETES MELLITUS: ICD-10-CM

## 2023-06-05 DIAGNOSIS — F32.1 CURRENT MODERATE EPISODE OF MAJOR DEPRESSIVE DISORDER WITHOUT PRIOR EPISODE: ICD-10-CM

## 2023-06-05 DIAGNOSIS — I70.0 AORTIC ATHEROSCLEROSIS: ICD-10-CM

## 2023-06-05 DIAGNOSIS — Z86.010 HISTORY OF COLON POLYPS: ICD-10-CM

## 2023-06-05 DIAGNOSIS — Z00.00 ROUTINE GENERAL MEDICAL EXAMINATION AT A HEALTH CARE FACILITY: Primary | ICD-10-CM

## 2023-06-05 DIAGNOSIS — I15.2 HYPERTENSION ASSOCIATED WITH DIABETES: ICD-10-CM

## 2023-06-05 DIAGNOSIS — E11.69 HYPERLIPIDEMIA ASSOCIATED WITH TYPE 2 DIABETES MELLITUS: ICD-10-CM

## 2023-06-05 DIAGNOSIS — I48.0 PAF (PAROXYSMAL ATRIAL FIBRILLATION): ICD-10-CM

## 2023-06-05 PROBLEM — F32.9 MAJOR DEPRESSIVE DISORDER, SINGLE EPISODE, UNSPECIFIED: Status: ACTIVE | Noted: 2023-06-05

## 2023-06-05 PROCEDURE — 99999 PR PBB SHADOW E&M-EST. PATIENT-LVL IV: CPT | Mod: PBBFAC,,, | Performed by: INTERNAL MEDICINE

## 2023-06-05 PROCEDURE — 99999 PR PBB SHADOW E&M-EST. PATIENT-LVL IV: ICD-10-PCS | Mod: PBBFAC,,, | Performed by: INTERNAL MEDICINE

## 2023-06-05 PROCEDURE — 99215 OFFICE O/P EST HI 40 MIN: CPT | Mod: S$PBB,,, | Performed by: INTERNAL MEDICINE

## 2023-06-05 PROCEDURE — 99214 OFFICE O/P EST MOD 30 MIN: CPT | Mod: PBBFAC,PO | Performed by: INTERNAL MEDICINE

## 2023-06-05 PROCEDURE — 99215 PR OFFICE/OUTPT VISIT, EST, LEVL V, 40-54 MIN: ICD-10-PCS | Mod: S$PBB,,, | Performed by: INTERNAL MEDICINE

## 2023-06-05 RX ORDER — ESCITALOPRAM OXALATE 10 MG/1
10 TABLET ORAL DAILY
Qty: 90 TABLET | Refills: 3 | Status: SHIPPED | OUTPATIENT
Start: 2023-06-05 | End: 2024-06-04

## 2023-06-05 NOTE — PROGRESS NOTES
HPI:  Patient is 83-year-old man who comes today for follow-up of his diabetes, hypertension, lipids, atrial fibrillation and for his annual physical exam.  He is accompanied today by his 2 children.  There is major concerns about his mental health.  Patient still works full-time at [employer].  On his off days has when he just complains of being excessively fatigued tired drained no energy.  He has no other interaction with the antibiotics except that FeZo.  He does not have any other social activities that he participates in.  Denies any chest pains, shortness breath or palpitations.  His blood pressures been controlled.  He does not check his blood sugar at home.      Current MEDS: medcard review, verified and update  Allergies: Per the electronic medical record    Past Medical History:   Diagnosis Date    Anxiety     Atrial fibrillation     BPH with obstruction/lower urinary tract symptoms     Depression     Diabetes mellitus type II, controlled     DM (diabetes mellitus)     BS doesn't check 02/22/2021    Hyperlipidemia associated with type 2 diabetes mellitus     Hypertension associated with diabetes        Past Surgical History:   Procedure Laterality Date    CARDIOVERSION N/A 1/23/2019    Procedure: CARDIOVERSION;  Surgeon: Charbel Pandey MD;  Location: [facility] CATH LAB;  Service: Cardiology;  Laterality: N/A;    COLONOSCOPY N/A 5/1/2017    Procedure: COLONOSCOPY;  Surgeon: Vishal Andrade MD;  Location: [facility] ENDO;  Service: Endoscopy;  Laterality: N/A;    TONSILLECTOMY         SHx: per the electronic medical record    FHx: recorded in the electronic medical record    ROS:    denies any chest pains or shortness of breath. Denies any nausea, vomiting or diarrhea. Denies any fever, chills or sweats. Denies any change in weight, voice, stool, skin or hair. Denies any dysuria, dyspepsia or dysphagia. Denies any change in vision, hearing or headaches. Denies any swollen lymph nodes or loss of memory.    PE:  BP  "138/70 (BP Location: Right arm, Patient Position: Sitting, BP Method: Large (Manual))   Pulse (!) 46   Temp 97.3 °F (36.3 °C) (Tympanic)   Resp 20   Ht 5' 8" (1.727 m)   Wt 90.8 kg (200 lb 2.8 oz)   SpO2 98%   BMI 30.44 kg/m²   Gen: Well-developed, well-nourished, male, in no acute distress, oriented x3  HEENT: neck is supple, no adenopathy, carotids 2+ equal without bruits, thyroid exam normal size without nodules.  CHEST: clear to auscultation and percussion  CVS: regular rate and rhythm without significant murmur, gallop, or rubs  ABD: soft, benign, no rebound no guarding, no distention.  Bowel sounds are normal.     nontender.  No palpable masses.  No organomegaly and no audible bruits.  RECTAL:  Deferred  EXT: no clubbing, cyanosis, or edema  LYMPH: no cervical, inguinal, or axillary adenopathy  FEET: no loss of sensation.  No ulcers or pressure sores.  Monofilament testing normal  NEURO: gait normal.  Cranial nerves II- XII intact. No nystagmus.  Speech normal.   Gross motor and sensory unremarkable.    Lab Results   Component Value Date    WBC 5.46 11/16/2022    HGB 15.8 11/16/2022    HCT 45.6 11/16/2022     (L) 11/16/2022    CHOL 148 09/12/2022    TRIG 43 09/12/2022    HDL 59 09/12/2022    ALT 27 11/16/2022    AST 31 11/16/2022     11/16/2022    K 4.1 11/16/2022     11/16/2022    CREATININE 0.9 11/16/2022    BUN 15 11/16/2022    CO2 19 (L) 11/16/2022    TSH 1.419 09/12/2022    PSA 2.9 10/10/2017    INR 1.0 01/22/2019    HGBA1C 5.5 09/12/2022       Impression:  Major depressive disorder, we had a long discussion about depression.  We discussed that he needs to be socially interactive continual basis.  We discussed medications to help him with his depression.  We discussed other modalities as well.  We even discussed possibility of him moving to an assisted living situation.  Hypertension, diabetes and lipids all needs to be reassessed  Paroxysmal atrial fibrillation, " asymptomatic  Patient Active Problem List   Diagnosis    Anxiety    Hyperlipidemia associated with type 2 diabetes mellitus    Hypertension associated with diabetes    Diabetes mellitus type II, controlled    Left inguinal hernia    History of colon polyps    BPH with obstruction/lower urinary tract symptoms    PAF (paroxysmal atrial fibrillation)    Aortic atherosclerosis    Other insomnia    Major depressive disorder, single episode, unspecified       Plan:   Orders Placed This Encounter    Hemoglobin A1C    Comprehensive Metabolic Panel    Lipid Panel    EScitalopram oxalate (LEXAPRO) 10 MG tablet     Patient was started on Lexapro 10 mg once a day.  He will have lab work done today we will call him with results.  He will have the other lab work above and see me in 6 months.  He will also see me at his scheduled appointment in July for follow-up of his depression  This note is generated with speech recognition software and is subject to transcription error and sound alike phrases that may be missed by proofreading.

## 2023-06-06 ENCOUNTER — LAB VISIT (OUTPATIENT)
Dept: LAB | Facility: HOSPITAL | Age: 83
End: 2023-06-06
Attending: INTERNAL MEDICINE
Payer: MEDICARE

## 2023-06-06 DIAGNOSIS — E11.9 CONTROLLED TYPE 2 DIABETES MELLITUS WITHOUT COMPLICATION, WITHOUT LONG-TERM CURRENT USE OF INSULIN: ICD-10-CM

## 2023-06-06 LAB
ALBUMIN/CREAT UR: 6.3 UG/MG (ref 0–30)
CREAT UR-MCNC: 95 MG/DL (ref 23–375)
MICROALBUMIN UR DL<=1MG/L-MCNC: 6 UG/ML

## 2023-06-06 PROCEDURE — 80061 LIPID PANEL: CPT | Performed by: INTERNAL MEDICINE

## 2023-06-06 PROCEDURE — 83036 HEMOGLOBIN GLYCOSYLATED A1C: CPT | Performed by: INTERNAL MEDICINE

## 2023-06-06 PROCEDURE — 84443 ASSAY THYROID STIM HORMONE: CPT | Performed by: INTERNAL MEDICINE

## 2023-06-06 PROCEDURE — 36415 COLL VENOUS BLD VENIPUNCTURE: CPT | Mod: PO | Performed by: INTERNAL MEDICINE

## 2023-06-06 PROCEDURE — 82570 ASSAY OF URINE CREATININE: CPT | Performed by: INTERNAL MEDICINE

## 2023-06-06 PROCEDURE — 80053 COMPREHEN METABOLIC PANEL: CPT | Performed by: INTERNAL MEDICINE

## 2023-06-07 ENCOUNTER — TELEPHONE (OUTPATIENT)
Dept: INTERNAL MEDICINE | Facility: CLINIC | Age: 83
End: 2023-06-07
Payer: MEDICARE

## 2023-06-07 LAB
ALBUMIN SERPL BCP-MCNC: 3.8 G/DL (ref 3.5–5.2)
ALP SERPL-CCNC: 61 U/L (ref 55–135)
ALT SERPL W/O P-5'-P-CCNC: 17 U/L (ref 10–44)
ANION GAP SERPL CALC-SCNC: 11 MMOL/L (ref 8–16)
AST SERPL-CCNC: 20 U/L (ref 10–40)
BILIRUB SERPL-MCNC: 0.9 MG/DL (ref 0.1–1)
BUN SERPL-MCNC: 21 MG/DL (ref 8–23)
CALCIUM SERPL-MCNC: 10.3 MG/DL (ref 8.7–10.5)
CHLORIDE SERPL-SCNC: 107 MMOL/L (ref 95–110)
CHOLEST SERPL-MCNC: 160 MG/DL (ref 120–199)
CHOLEST/HDLC SERPL: 2.6 {RATIO} (ref 2–5)
CO2 SERPL-SCNC: 23 MMOL/L (ref 23–29)
CREAT SERPL-MCNC: 1 MG/DL (ref 0.5–1.4)
EST. GFR  (NO RACE VARIABLE): >60 ML/MIN/1.73 M^2
ESTIMATED AVG GLUCOSE: 114 MG/DL (ref 68–131)
GLUCOSE SERPL-MCNC: 119 MG/DL (ref 70–110)
HBA1C MFR BLD: 5.6 % (ref 4–5.6)
HDLC SERPL-MCNC: 61 MG/DL (ref 40–75)
HDLC SERPL: 38.1 % (ref 20–50)
LDLC SERPL CALC-MCNC: 86.8 MG/DL (ref 63–159)
NONHDLC SERPL-MCNC: 99 MG/DL
POTASSIUM SERPL-SCNC: 4.6 MMOL/L (ref 3.5–5.1)
PROT SERPL-MCNC: 7.4 G/DL (ref 6–8.4)
SODIUM SERPL-SCNC: 141 MMOL/L (ref 136–145)
TRIGL SERPL-MCNC: 61 MG/DL (ref 30–150)
TSH SERPL DL<=0.005 MIU/L-ACNC: 1.78 UIU/ML (ref 0.4–4)

## 2023-06-21 RX ORDER — BLOOD SUGAR DIAGNOSTIC
1 STRIP MISCELLANEOUS DAILY
Qty: 100 STRIP | Refills: 3 | Status: SHIPPED | OUTPATIENT
Start: 2023-06-21 | End: 2023-06-22 | Stop reason: SDUPTHER

## 2023-06-21 RX ORDER — BLOOD SUGAR DIAGNOSTIC
1 STRIP MISCELLANEOUS DAILY
Qty: 100 STRIP | Refills: 3 | Status: SHIPPED | OUTPATIENT
Start: 2023-06-21 | End: 2023-06-21 | Stop reason: SDUPTHER

## 2023-06-21 RX ORDER — BLOOD SUGAR DIAGNOSTIC
1 STRIP MISCELLANEOUS DAILY
COMMUNITY
Start: 2023-06-06 | End: 2023-06-21 | Stop reason: SDUPTHER

## 2023-06-21 NOTE — TELEPHONE ENCOUNTER
No care due was identified.  [facility] Embedded Care Due Messages. Reference number: [id number].   6/21/2023 1:59:08 PM CDT

## 2023-06-21 NOTE — TELEPHONE ENCOUNTER
No care due was identified.  [facility] Embedded Care Due Messages. Reference number: [id number].   6/21/2023 12:31:37 PM CDT

## 2023-06-22 RX ORDER — BLOOD SUGAR DIAGNOSTIC
1 STRIP MISCELLANEOUS DAILY
Qty: 100 STRIP | Refills: 3 | Status: SHIPPED | OUTPATIENT
Start: 2023-06-22

## 2023-06-22 NOTE — TELEPHONE ENCOUNTER
----- Message from Be Niño sent at 6/22/2023 12:09 PM CDT -----  Contact: [phone]  Pt is calling requesting Diagnosis code for ACCU-CHEK GUIDE TEST STRIPS Strp . Please call back at [phone] . Thanksdj         [facility] [address] [place] [id number]  Phone: [phone] Fax: [phone]           Detail Level: Simple Additional Notes: Continue using Ketoconazole 2% shampoo every other day and Ketoconazole 2% cream twice daily.

## 2023-06-22 NOTE — TELEPHONE ENCOUNTER
No care due was identified.  [facility] Embedded Care Due Messages. Reference number: [id number].   6/22/2023 11:08:44 AM CDT

## 2023-08-19 NOTE — TELEPHONE ENCOUNTER
No care due was identified.  Health [facility] Embedded Care Due Messages. Reference number: [id number].   8/19/2023 12:01:12 PM CDT

## 2023-08-21 ENCOUNTER — TELEPHONE (OUTPATIENT)
Dept: ADMINISTRATIVE | Facility: HOSPITAL | Age: 83
End: 2023-08-21
Payer: MEDICARE

## 2023-08-21 RX ORDER — APIXABAN 5 MG/1
TABLET, FILM COATED ORAL
Qty: 180 TABLET | Refills: 3 | Status: SHIPPED | OUTPATIENT
Start: 2023-08-21

## 2023-08-21 RX ORDER — SIMVASTATIN 40 MG/1
TABLET, FILM COATED ORAL
Qty: 90 TABLET | Refills: 3 | Status: SHIPPED | OUTPATIENT
Start: 2023-08-21

## 2023-08-21 RX ORDER — METFORMIN HYDROCHLORIDE 850 MG/1
TABLET ORAL
Qty: 90 TABLET | Refills: 1 | Status: SHIPPED | OUTPATIENT
Start: 2023-08-21 | End: 2024-02-08

## 2023-08-21 NOTE — TELEPHONE ENCOUNTER
Refill Routing Note   Medication(s) are not appropriate for processing by [facility] for the following reason(s):      Medication outside of protocol  Drug-drug interaction    [facility] action(s):  Defer  Route  Approve Care Due:  None identified     Medication Therapy Plan: metFORMIN and BPH with obstruction/lower urinary tract symptoms    Pharmacist review requested: Yes     Appointments  past 12m or future 3m with PCP    Date Provider   Last Visit   6/5/2023 Thee Zarate MD   Next Visit   8/21/2023 Thee Zarate MD   ED visits in past 90 days: 0        Note composed:11:38 AM 08/21/2023

## 2023-08-21 NOTE — TELEPHONE ENCOUNTER
Refill Routing Note   Medication(s) are not appropriate for processing by [facility] for the following reason(s):      Medication outside of protocol    [facility] action(s):  Route  Approve Care Due:  None identified          Pharmacist review requested: Yes   Extended chart review required: Yes     Appointments  past 12m or future 3m with PCP    Date Provider   Last Visit   6/5/2023 Thee Zarate MD   Next Visit   8/21/2023 Thee Zarate MD   ED visits in past 90 days: 0        Note composed:1:27 PM 08/21/2023

## 2023-12-01 ENCOUNTER — TELEPHONE (OUTPATIENT)
Dept: PRIMARY CARE CLINIC | Facility: CLINIC | Age: 83
End: 2023-12-01
Payer: MEDICARE

## 2023-12-01 NOTE — TELEPHONE ENCOUNTER
Contacted pt to schedule a New Pt appointment. Pt did not answer the phone. Left pt a detailed voice message with instructions to contact the office to continue scheduling process.----- Message from Grecia Patterson sent at 12/1/2023 11:19 AM CST -----  Contact: Pt  Pt is calling to come in to schedule for his lifestyle and wellness visit/ wants to come in on a Tuesday and can be reached at [phone]//[email or web address] / leave a VM if there's no answer

## 2023-12-05 ENCOUNTER — TELEPHONE (OUTPATIENT)
Dept: INTERNAL MEDICINE | Facility: CLINIC | Age: 83
End: 2023-12-05

## 2023-12-05 ENCOUNTER — OFFICE VISIT (OUTPATIENT)
Dept: INTERNAL MEDICINE | Facility: CLINIC | Age: 83
End: 2023-12-05
Payer: MEDICARE

## 2023-12-05 ENCOUNTER — LAB VISIT (OUTPATIENT)
Dept: LAB | Facility: HOSPITAL | Age: 83
End: 2023-12-05
Attending: INTERNAL MEDICINE
Payer: MEDICARE

## 2023-12-05 VITALS
OXYGEN SATURATION: 96 % | HEART RATE: 65 BPM | RESPIRATION RATE: 18 BRPM | BODY MASS INDEX: 29.17 KG/M2 | SYSTOLIC BLOOD PRESSURE: 138 MMHG | HEIGHT: 68 IN | DIASTOLIC BLOOD PRESSURE: 80 MMHG | WEIGHT: 192.44 LBS

## 2023-12-05 DIAGNOSIS — E11.9 CONTROLLED TYPE 2 DIABETES MELLITUS WITHOUT COMPLICATION, WITHOUT LONG-TERM CURRENT USE OF INSULIN: ICD-10-CM

## 2023-12-05 DIAGNOSIS — N13.8 BPH WITH OBSTRUCTION/LOWER URINARY TRACT SYMPTOMS: ICD-10-CM

## 2023-12-05 DIAGNOSIS — N40.1 BPH WITH OBSTRUCTION/LOWER URINARY TRACT SYMPTOMS: ICD-10-CM

## 2023-12-05 DIAGNOSIS — G47.09 OTHER INSOMNIA: ICD-10-CM

## 2023-12-05 DIAGNOSIS — F41.9 ANXIETY: ICD-10-CM

## 2023-12-05 DIAGNOSIS — E78.5 HYPERLIPIDEMIA ASSOCIATED WITH TYPE 2 DIABETES MELLITUS: ICD-10-CM

## 2023-12-05 DIAGNOSIS — Z23 NEED FOR VACCINATION: ICD-10-CM

## 2023-12-05 DIAGNOSIS — E11.59 HYPERTENSION ASSOCIATED WITH DIABETES: ICD-10-CM

## 2023-12-05 DIAGNOSIS — Z00.00 ENCOUNTER FOR PREVENTIVE HEALTH EXAMINATION: Primary | ICD-10-CM

## 2023-12-05 DIAGNOSIS — I70.0 AORTIC ATHEROSCLEROSIS: ICD-10-CM

## 2023-12-05 DIAGNOSIS — K40.90 LEFT INGUINAL HERNIA: ICD-10-CM

## 2023-12-05 DIAGNOSIS — I48.0 PAF (PAROXYSMAL ATRIAL FIBRILLATION): ICD-10-CM

## 2023-12-05 DIAGNOSIS — E11.69 HYPERLIPIDEMIA ASSOCIATED WITH TYPE 2 DIABETES MELLITUS: ICD-10-CM

## 2023-12-05 DIAGNOSIS — F32.1 CURRENT MODERATE EPISODE OF MAJOR DEPRESSIVE DISORDER WITHOUT PRIOR EPISODE: ICD-10-CM

## 2023-12-05 DIAGNOSIS — I15.2 HYPERTENSION ASSOCIATED WITH DIABETES: ICD-10-CM

## 2023-12-05 LAB
ALBUMIN SERPL BCP-MCNC: 4.1 G/DL (ref 3.5–5.2)
ALP SERPL-CCNC: 67 U/L (ref 55–135)
ALT SERPL W/O P-5'-P-CCNC: 19 U/L (ref 10–44)
ANION GAP SERPL CALC-SCNC: 9 MMOL/L (ref 8–16)
AST SERPL-CCNC: 19 U/L (ref 10–40)
BILIRUB SERPL-MCNC: 0.9 MG/DL (ref 0.1–1)
BUN SERPL-MCNC: 19 MG/DL (ref 8–23)
CALCIUM SERPL-MCNC: 10.2 MG/DL (ref 8.7–10.5)
CHLORIDE SERPL-SCNC: 107 MMOL/L (ref 95–110)
CHOLEST SERPL-MCNC: 183 MG/DL (ref 120–199)
CHOLEST/HDLC SERPL: 2.8 {RATIO} (ref 2–5)
CO2 SERPL-SCNC: 26 MMOL/L (ref 23–29)
CREAT SERPL-MCNC: 1 MG/DL (ref 0.5–1.4)
EST. GFR  (NO RACE VARIABLE): >60 ML/MIN/1.73 M^2
ESTIMATED AVG GLUCOSE: 111 MG/DL (ref 68–131)
GLUCOSE SERPL-MCNC: 115 MG/DL (ref 70–110)
HBA1C MFR BLD: 5.5 % (ref 4–5.6)
HDLC SERPL-MCNC: 65 MG/DL (ref 40–75)
HDLC SERPL: 35.5 % (ref 20–50)
LDLC SERPL CALC-MCNC: 103.6 MG/DL (ref 63–159)
NONHDLC SERPL-MCNC: 118 MG/DL
POTASSIUM SERPL-SCNC: 4.4 MMOL/L (ref 3.5–5.1)
PROT SERPL-MCNC: 7.6 G/DL (ref 6–8.4)
SODIUM SERPL-SCNC: 142 MMOL/L (ref 136–145)
TRIGL SERPL-MCNC: 72 MG/DL (ref 30–150)

## 2023-12-05 PROCEDURE — 36415 COLL VENOUS BLD VENIPUNCTURE: CPT | Mod: PO | Performed by: INTERNAL MEDICINE

## 2023-12-05 PROCEDURE — 99999 PR PBB SHADOW E&M-EST. PATIENT-LVL V: CPT | Mod: PBBFAC,,, | Performed by: NURSE PRACTITIONER

## 2023-12-05 PROCEDURE — G0439 PR MEDICARE ANNUAL WELLNESS SUBSEQUENT VISIT: ICD-10-PCS | Mod: ,,, | Performed by: NURSE PRACTITIONER

## 2023-12-05 PROCEDURE — 99215 OFFICE O/P EST HI 40 MIN: CPT | Mod: PBBFAC,PO | Performed by: NURSE PRACTITIONER

## 2023-12-05 PROCEDURE — G0008 ADMIN INFLUENZA VIRUS VAC: HCPCS | Mod: PBBFAC,PO

## 2023-12-05 PROCEDURE — 83036 HEMOGLOBIN GLYCOSYLATED A1C: CPT | Performed by: INTERNAL MEDICINE

## 2023-12-05 PROCEDURE — 80053 COMPREHEN METABOLIC PANEL: CPT | Performed by: INTERNAL MEDICINE

## 2023-12-05 PROCEDURE — 99999 PR PBB SHADOW E&M-EST. PATIENT-LVL V: ICD-10-PCS | Mod: PBBFAC,,, | Performed by: NURSE PRACTITIONER

## 2023-12-05 PROCEDURE — 90694 VACC AIIV4 NO PRSRV 0.5ML IM: CPT | Mod: PBBFAC,PO | Performed by: NURSE PRACTITIONER

## 2023-12-05 PROCEDURE — 99999PBSHW FLU VACCINE - QUADRIVALENT - ADJUVANTED: ICD-10-PCS | Mod: PBBFAC,,,

## 2023-12-05 PROCEDURE — 99999PBSHW FLU VACCINE - QUADRIVALENT - ADJUVANTED: Mod: PBBFAC,,,

## 2023-12-05 PROCEDURE — G0439 PPPS, SUBSEQ VISIT: HCPCS | Mod: ,,, | Performed by: NURSE PRACTITIONER

## 2023-12-05 PROCEDURE — 80061 LIPID PANEL: CPT | Performed by: INTERNAL MEDICINE

## 2023-12-05 NOTE — PATIENT INSTRUCTIONS
Counseling and Referral of Other Preventative  (Italic type indicates deductible and co-insurance are waived)    Patient Name: Bi Jessica  Today's Date: 12/5/2023    Health Maintenance       Date Due Completion Date    COVID-19 Vaccine (1) Never done ---    RSV Vaccine (Age 60+ and Pregnant patients) (1 - 1-dose 60+ series) Never done ---    Eye Exam 07/25/2023 7/25/2022    Hemoglobin A1c 12/06/2023 6/6/2023    Foot Exam 06/05/2024 6/5/2023 (Done)    Override on 6/5/2023: Done    Override on 3/28/2022: Done    Override on 11/3/2020: Done    Override on 11/25/2019: Done    Override on 5/20/2019: Done    Override on 7/9/2015: Done    Diabetes Urine Screening 06/06/2024 6/6/2023    Lipid Panel 06/06/2024 6/6/2023    TETANUS VACCINE 01/22/2029 1/22/2019        Orders Placed This Encounter   Procedures    Influenza - Quadrivalent (Adjuvanted)       The following information is provided to all patients.  This information is to help you find resources for any of the problems found today that may be affecting your health:                Living healthy guide: www.[facility].[place].gov      Understanding Diabetes: www.diabetes.org      Eating healthy: www.cdc.gov/healthyweight      CDC home safety checklist: www.cdc.gov/steadi/patient.html      Agency on Aging: www.goea.[place].[facility]      Alcoholics anonymous (AA): www.aa.org      Physical Activity: www.[place].nih.gov/ow1wkhr      Tobacco use: www.quitwithusla.org

## 2023-12-05 NOTE — TELEPHONE ENCOUNTER
----- Message from Dallin Jacome NP sent at 12/5/2023 11:47 AM CST -----  Regarding: appt  Please schedule an annual diabetic eye exam with Dr Olvera for him, on a Tuesday, and send through his my chart.  thanks

## 2023-12-05 NOTE — PROGRESS NOTES
"  Bi Jessica presented for a  Medicare AWV and comprehensive Health Risk Assessment today. The following components were reviewed and updated:    Medical history  Family History  Social history  Allergies and Current Medications  Health Risk Assessment  Health Maintenance  Care Team         ** See Completed Assessments for Annual Wellness Visit within the encounter summary.**         The following assessments were completed:  Living Situation  CAGE  Depression Screening  Timed Get Up and Go  Whisper Test  Cognitive Function Screening    Nutrition Screening  ADL Screening  PAQ Screening        Vitals:    12/05/23 0908   BP: 138/80   Pulse: 65   Resp: 18   SpO2: 96%   Weight: 87.3 kg (192 lb 7.4 oz)   Height: 5' 8" (1.727 m)     Body mass index is 29.26 kg/m².  Physical Exam  Constitutional:       General: He is not in acute distress.     Appearance: Normal appearance. He is well-developed. He is not ill-appearing, toxic-appearing or diaphoretic.   HENT:      Head: Normocephalic and atraumatic.      Right Ear: External ear normal.      Left Ear: External ear normal.      Nose: Nose normal.   Eyes:      Conjunctiva/sclera: Conjunctivae normal.   Neck:      Vascular: No carotid bruit.   Cardiovascular:      Rate and Rhythm: Normal rate and regular rhythm.      Pulses: Normal pulses.      Heart sounds: Normal heart sounds. No murmur heard.     No friction rub. No gallop.   Pulmonary:      Effort: Pulmonary effort is normal. No respiratory distress.      Breath sounds: Normal breath sounds. No stridor. No wheezing, rhonchi or rales.   Chest:      Chest wall: No tenderness.   Abdominal:      General: Bowel sounds are normal. There is no distension.      Palpations: There is no mass.      Tenderness: There is no abdominal tenderness.      Hernia: No hernia is present.   Musculoskeletal:         General: No tenderness.      Cervical back: Neck supple. No tenderness.   Lymphadenopathy:      Cervical: No cervical adenopathy. "   Skin:     General: Skin is warm and dry.      Comments: Left inguinal hernia   Neurological:      Mental Status: He is alert and oriented to person, place, and time.      Cranial Nerves: No cranial nerve deficit.   Psychiatric:         Mood and Affect: Mood normal.         Behavior: Behavior normal.               Diagnoses and health risks identified today and associated recommendations/orders:    1. Encounter for preventive health examination  Screenings performed, as noted above.  Personal preventative testing needs reviewed.      2. Need for vaccination  Had flu shot today  - Influenza - Quadrivalent (Adjuvanted)    3. Hyperlipidemia associated with type 2 diabetes mellitus  Treated with Simvastatin, stable, cont tx    4. Hypertension associated with diabetes  Treated with lisinopril, stable, cont tx    5. PAF (paroxysmal atrial fibrillation)  Treated with Eliquis, stable, cont tx    6. Aortic atherosclerosis  Monitored, stable on a statin, cont tx    7. Anxiety  Monitored, stable, taking his Lexapro, cont tx, continues to work 4 days a week as a  at [employer]    8. Current moderate episode of major depressive disorder without prior episode  Treated with Lexapro, stable, cont tx    9. BPH with obstruction/lower urinary tract symptoms  Monitored, stable, follow up with pcp    10. Controlled type 2 diabetes mellitus without complication, without long-term current use of insulin  Treated with Metformin, stable, last hga1c 5.6, cont tx    11. Left inguinal hernia  Monitored, stable, reports no pain or problems, follow up as needed    12. Other insomnia  Monitored, still reports some difficulty, discussed sleep hygiene, follow up with pcp    Review for Substance Use Disorders: patient does not use substances per chart    Review for opioid screening: Patient is not prescribed opioids       Marva Pat with a 5-10 year written screening schedule and personal prevention plan. Recommendations were developed  using the USPSTF age appropriate recommendations. Education, counseling, and referrals were provided as needed. After Visit Summary printed and given to patient which includes a list of additional screenings\tests needed.    No follow-ups on file.    Dallin Jacome, NEEL  I offered to discuss advanced care planning, including how to pick a person who would make decisions for you if you were unable to make them for yourself, called a health care power of , and what kind of decisions you might make such as use of life sustaining treatments such as ventilators and tube feeding when faced with a life limiting illness recorded on a living will that they will need to know. (How you want to be cared for as you near the end of your natural life)     X Patient is interested in learning more about how to make advanced directives.  I provided them paperwork and offered to discuss this with them.

## 2023-12-12 ENCOUNTER — OFFICE VISIT (OUTPATIENT)
Dept: INTERNAL MEDICINE | Facility: CLINIC | Age: 83
End: 2023-12-12
Payer: MEDICARE

## 2023-12-12 VITALS
DIASTOLIC BLOOD PRESSURE: 70 MMHG | SYSTOLIC BLOOD PRESSURE: 110 MMHG | OXYGEN SATURATION: 98 % | BODY MASS INDEX: 29.14 KG/M2 | WEIGHT: 192.25 LBS | HEIGHT: 68 IN | HEART RATE: 62 BPM

## 2023-12-12 DIAGNOSIS — N40.1 BPH WITH OBSTRUCTION/LOWER URINARY TRACT SYMPTOMS: ICD-10-CM

## 2023-12-12 DIAGNOSIS — I70.0 AORTIC ATHEROSCLEROSIS: ICD-10-CM

## 2023-12-12 DIAGNOSIS — E78.5 HYPERLIPIDEMIA ASSOCIATED WITH TYPE 2 DIABETES MELLITUS: ICD-10-CM

## 2023-12-12 DIAGNOSIS — Z86.010 HISTORY OF COLON POLYPS: ICD-10-CM

## 2023-12-12 DIAGNOSIS — E11.69 HYPERLIPIDEMIA ASSOCIATED WITH TYPE 2 DIABETES MELLITUS: ICD-10-CM

## 2023-12-12 DIAGNOSIS — E11.9 CONTROLLED TYPE 2 DIABETES MELLITUS WITHOUT COMPLICATION, WITHOUT LONG-TERM CURRENT USE OF INSULIN: ICD-10-CM

## 2023-12-12 DIAGNOSIS — F32.1 CURRENT MODERATE EPISODE OF MAJOR DEPRESSIVE DISORDER WITHOUT PRIOR EPISODE: ICD-10-CM

## 2023-12-12 DIAGNOSIS — N13.8 BPH WITH OBSTRUCTION/LOWER URINARY TRACT SYMPTOMS: ICD-10-CM

## 2023-12-12 DIAGNOSIS — I48.0 PAF (PAROXYSMAL ATRIAL FIBRILLATION): ICD-10-CM

## 2023-12-12 DIAGNOSIS — E11.59 HYPERTENSION ASSOCIATED WITH DIABETES: Primary | ICD-10-CM

## 2023-12-12 DIAGNOSIS — I15.2 HYPERTENSION ASSOCIATED WITH DIABETES: Primary | ICD-10-CM

## 2023-12-12 PROCEDURE — 99214 PR OFFICE/OUTPT VISIT, EST, LEVL IV, 30-39 MIN: ICD-10-PCS | Mod: 25,S$PBB,, | Performed by: INTERNAL MEDICINE

## 2023-12-12 PROCEDURE — 99999 PR PBB SHADOW E&M-EST. PATIENT-LVL IV: CPT | Mod: PBBFAC,,, | Performed by: INTERNAL MEDICINE

## 2023-12-12 PROCEDURE — 90677 PCV20 VACCINE IM: CPT | Mod: PBBFAC,PO

## 2023-12-12 PROCEDURE — 99999 PR PBB SHADOW E&M-EST. PATIENT-LVL IV: ICD-10-PCS | Mod: PBBFAC,,, | Performed by: INTERNAL MEDICINE

## 2023-12-12 PROCEDURE — 99214 OFFICE O/P EST MOD 30 MIN: CPT | Mod: 25,S$PBB,, | Performed by: INTERNAL MEDICINE

## 2023-12-12 PROCEDURE — 99214 OFFICE O/P EST MOD 30 MIN: CPT | Mod: PBBFAC,PO,25 | Performed by: INTERNAL MEDICINE

## 2023-12-12 PROCEDURE — 99999PBSHW PNEUMOCOCCAL CONJUGATE VACCINE 20-VALENT: ICD-10-PCS | Mod: PBBFAC,,,

## 2023-12-12 PROCEDURE — 99999PBSHW PNEUMOCOCCAL CONJUGATE VACCINE 20-VALENT: Mod: PBBFAC,,,

## 2023-12-12 NOTE — PROGRESS NOTES
"HPI:  Patient is a 83-year-old man who comes today for follow-up of his diabetes, hypertension and lipids.  He overall has been doing well.  He does continue to work 4 days a week at [employer].  He denies any new complaints.  He continues to have problems with sleeping.  He admits that he does cat nap on his days off.  He denies any hypoglycemia.  He denies any problems with his blood pressure.  Current meds have been verified and updated per the EMR  Exam:/70 (BP Location: Right arm)   Pulse 62   Ht 5' 8" (1.727 m)   Wt 87.2 kg (192 lb 3.9 oz)   SpO2 98%   BMI 29.23 kg/m²   Carotids 2+ equal without bruits  Chest clear  Cardiovascular regular rate and rhythm without murmur gallop or rub  Extremities without edema    Lab Results   Component Value Date    WBC 5.46 11/16/2022    HGB 15.8 11/16/2022    HCT 45.6 11/16/2022     (L) 11/16/2022    CHOL 183 12/05/2023    TRIG 72 12/05/2023    HDL 65 12/05/2023    ALT 19 12/05/2023    AST 19 12/05/2023     12/05/2023    K 4.4 12/05/2023     12/05/2023    CREATININE 1.0 12/05/2023    BUN 19 12/05/2023    CO2 26 12/05/2023    TSH 1.781 06/06/2023    PSA 2.9 10/10/2017    INR 1.0 01/22/2019    HGBA1C 5.5 12/05/2023       Impression:  Diabetes, hypertension, lipids all very well controlled on current therapy  Major depressive disorder, stable unchanged  Patient Active Problem List   Diagnosis    Anxiety    Hyperlipidemia associated with type 2 diabetes mellitus    Hypertension associated with diabetes    Diabetes mellitus type II, controlled    Left inguinal hernia    History of colon polyps    BPH with obstruction/lower urinary tract symptoms    PAF (paroxysmal atrial fibrillation)    Aortic atherosclerosis    Other insomnia    Major depressive disorder, single episode, unspecified       Plan:  Orders Placed This Encounter    (In Office Administered) Pneumococcal Conjugate Vaccine (20 Valent) (IM) (Preferred)    Hemoglobin A1C    Comprehensive " Metabolic Panel    Lipid Panel    TSH    CBC Auto Differential    Microalbumin/Creatinine Ratio, Urine     Patient was given Prevnar 20 vaccine today.  Medications remain the same.  He will see me again in 6 months with above lab work.    This note is generated with speech recognition software and is subject to transcription error and sound alike phrases that may be missed by proofreading.

## 2023-12-19 ENCOUNTER — TELEPHONE (OUTPATIENT)
Dept: INTERNAL MEDICINE | Facility: CLINIC | Age: 83
End: 2023-12-19
Payer: MEDICARE

## 2023-12-19 NOTE — TELEPHONE ENCOUNTER
----- Message from Aurora Sinha sent at 12/19/2023 10:54 AM CST -----  Contact: Bi  Type:  Needs Medical Advice    Who Called: iB  Symptoms (please be specific):    How long has patient had these symptoms:  Stomach congestion/Vomiting mucus  Pharmacy name and phone #:    [facility] [address] [place], [place] - [address] [address] [facility] [address]  Phone: [phone] Fax: [phone]  Would the patient rather a call back or a response via [facility]? call  Best Call Back Number: [phone]  Additional Information: Patient request to speak with staff members Hamilton or Sneha, regarding symptoms.  Thank you,  GH

## 2023-12-19 NOTE — TELEPHONE ENCOUNTER
Patient says that he is feeling fatigue and a little congested.  He has been taking Mucinex and it is bringing up a lot of the mucous. He does not want to get out the house and come in for an appointment.  He would any suggestions you can give him.  The grand babies are also sick in the household.  Please review and advise.

## 2023-12-19 NOTE — TELEPHONE ENCOUNTER
Tell him to make sure he is taking Mucinex DM and not just the plain Mucinex.  He needs to force fluids.  For aches and pains or low-grade fever he needs to take Tylenol.

## 2024-02-08 DIAGNOSIS — E11.59 HYPERTENSION ASSOCIATED WITH DIABETES: ICD-10-CM

## 2024-02-08 DIAGNOSIS — I15.2 HYPERTENSION ASSOCIATED WITH DIABETES: ICD-10-CM

## 2024-02-08 RX ORDER — METFORMIN HYDROCHLORIDE 850 MG/1
TABLET ORAL
Qty: 90 TABLET | Refills: 3 | Status: SHIPPED | OUTPATIENT
Start: 2024-02-08

## 2024-02-08 RX ORDER — LISINOPRIL 20 MG/1
TABLET ORAL
Qty: 180 TABLET | Refills: 3 | Status: SHIPPED | OUTPATIENT
Start: 2024-02-08

## 2024-02-08 NOTE — TELEPHONE ENCOUNTER
Requested Prescriptions     Pending Prescriptions Disp Refills    metFORMIN (GLUCOPHAGE) 850 MG tablet [Pharmacy Med Name: metFORMIN HCl 850 MG Oral Tablet] 90 tablet 0     Sig: Take 1 tablet by mouth once daily    lisinopriL (PRINIVIL,ZESTRIL) 20 MG tablet [Pharmacy Med Name: Lisinopril 20 MG Oral Tablet] 180 tablet 0     Sig: Take 1 tablet by mouth twice daily     LV 12/12/2023   NV 06/26/2024  LF 08/21/2023

## 2024-02-13 ENCOUNTER — OFFICE VISIT (OUTPATIENT)
Dept: OPHTHALMOLOGY | Facility: CLINIC | Age: 84
End: 2024-02-13
Payer: MEDICARE

## 2024-02-13 DIAGNOSIS — H52.7 REFRACTIVE ERRORS: ICD-10-CM

## 2024-02-13 DIAGNOSIS — E11.9 TYPE 2 DIABETES MELLITUS WITHOUT RETINOPATHY: Primary | ICD-10-CM

## 2024-02-13 DIAGNOSIS — E11.36 DIABETIC CATARACT: ICD-10-CM

## 2024-02-13 PROCEDURE — 92014 COMPRE OPH EXAM EST PT 1/>: CPT | Mod: S$PBB,,, | Performed by: OPTOMETRIST

## 2024-02-13 PROCEDURE — 99213 OFFICE O/P EST LOW 20 MIN: CPT | Mod: PBBFAC | Performed by: OPTOMETRIST

## 2024-02-13 PROCEDURE — 92015 DETERMINE REFRACTIVE STATE: CPT | Mod: ,,, | Performed by: OPTOMETRIST

## 2024-02-13 PROCEDURE — 99999 PR PBB SHADOW E&M-EST. PATIENT-LVL III: CPT | Mod: PBBFAC,,, | Performed by: OPTOMETRIST

## 2024-02-13 NOTE — PROGRESS NOTES
SUBJECTIVE  Bi Jessica is 83 y.o. male  Uncorrected distance visual acuity was 20/30 +2 in the right eye and 20/40 in the left eye. Uncorrected near visual acuity was J4 in the right eye and J4 in the left eye.   Chief Complaint   Patient presents with    Diabetic Eye Exam     Pt here for Dm eye exam. Pt last A1C was 5.5 on 12/23/23.Pt states he has not been checking his BS. Pt states he wears reading glasses. Pt denies vision changes. Pt denies flashes of lights and floaters.           HPI     Diabetic Eye Exam     Additional comments: Pt here for Dm eye exam. Pt last A1C was 5.5 on   12/23/23.Pt states he has not been checking his BS. Pt states he wears   reading glasses. Pt denies vision changes. Pt denies flashes of lights and   floaters.            Comments    Retired from the state.  Patient has been working for [employer] for 20+   years            Last edited by Vitaly Silva on 2/13/2024  1:57 PM.         Assessment /Plan :  1. Type 2 diabetes mellitus without retinopathy   No Background Diabetic Retinopathy  Strict BG control, f/u w/ PCP, and annual DFE  Stressed importance of DM control to preserve vision        2. Diabetic cataract   Moderate cataracts OU. Cataracts are not significantly affecting activities of daily living and therefore surgery is not indicated at this time.   Will continue to monitor annually. Pt to call or RTC with any significant change in vision prior to next visit.      3. Refractive errors   Dispense Final Rx for glasses or may use OTC glasses.  RTC 1 year  Discussed above and answered questions.

## 2024-05-06 RX ORDER — ESCITALOPRAM OXALATE 10 MG/1
10 TABLET ORAL
Qty: 90 TABLET | Refills: 3 | Status: SHIPPED | OUTPATIENT
Start: 2024-05-06

## 2024-05-13 ENCOUNTER — TELEPHONE (OUTPATIENT)
Dept: INTERNAL MEDICINE | Facility: CLINIC | Age: 84
End: 2024-05-13
Payer: MEDICARE

## 2024-05-13 NOTE — TELEPHONE ENCOUNTER
Spoke with patient, let him know that is the correct medication for that. Pt stated he will start the medication. Pt verbalized understanding.

## 2024-05-13 NOTE — TELEPHONE ENCOUNTER
----- Message from Collette Garza sent at 5/10/2024  5:53 PM CDT -----  Type: General Call Back     Name of Caller:JEROME HINKLE [[id number]]  Symptoms:mediction  Would the patient rather a call back or a response via [facility]? Call back   Best Call Back Number:[phone]  Additional Information: pt indicates he received medication EScitalopram oxalate (LEXAPRO) 10 MG tablet. Pt indicates he spoke with provider a while ago about depression and anxiety and just received medication. Pt indicates he would like to know if he should take the medication. Please call back with further assistance and more information.

## 2024-06-19 ENCOUNTER — LAB VISIT (OUTPATIENT)
Dept: LAB | Facility: HOSPITAL | Age: 84
End: 2024-06-19
Attending: INTERNAL MEDICINE
Payer: MEDICARE

## 2024-06-19 DIAGNOSIS — E11.9 CONTROLLED TYPE 2 DIABETES MELLITUS WITHOUT COMPLICATION, WITHOUT LONG-TERM CURRENT USE OF INSULIN: ICD-10-CM

## 2024-06-19 LAB
ALBUMIN SERPL BCP-MCNC: 3.8 G/DL (ref 3.5–5.2)
ALBUMIN/CREAT UR: NORMAL UG/MG (ref 0–30)
ALP SERPL-CCNC: 57 U/L (ref 55–135)
ALT SERPL W/O P-5'-P-CCNC: 20 U/L (ref 10–44)
ANION GAP SERPL CALC-SCNC: 10 MMOL/L (ref 8–16)
AST SERPL-CCNC: 22 U/L (ref 10–40)
BASOPHILS # BLD AUTO: 0.05 K/UL (ref 0–0.2)
BASOPHILS NFR BLD: 0.7 % (ref 0–1.9)
BILIRUB SERPL-MCNC: 0.8 MG/DL (ref 0.1–1)
BUN SERPL-MCNC: 20 MG/DL (ref 8–23)
CALCIUM SERPL-MCNC: 10.2 MG/DL (ref 8.7–10.5)
CHLORIDE SERPL-SCNC: 107 MMOL/L (ref 95–110)
CHOLEST SERPL-MCNC: 174 MG/DL (ref 120–199)
CHOLEST/HDLC SERPL: 2.9 {RATIO} (ref 2–5)
CO2 SERPL-SCNC: 23 MMOL/L (ref 23–29)
CREAT SERPL-MCNC: 1.1 MG/DL (ref 0.5–1.4)
CREAT UR-MCNC: 102 MG/DL (ref 23–375)
DIFFERENTIAL METHOD BLD: ABNORMAL
EOSINOPHIL # BLD AUTO: 0.1 K/UL (ref 0–0.5)
EOSINOPHIL NFR BLD: 1.9 % (ref 0–8)
ERYTHROCYTE [DISTWIDTH] IN BLOOD BY AUTOMATED COUNT: 12.6 % (ref 11.5–14.5)
EST. GFR  (NO RACE VARIABLE): >60 ML/MIN/1.73 M^2
ESTIMATED AVG GLUCOSE: 114 MG/DL (ref 68–131)
GLUCOSE SERPL-MCNC: 113 MG/DL (ref 70–110)
HBA1C MFR BLD: 5.6 % (ref 4–5.6)
HCT VFR BLD AUTO: 48.1 % (ref 40–54)
HDLC SERPL-MCNC: 60 MG/DL (ref 40–75)
HDLC SERPL: 34.5 % (ref 20–50)
HGB BLD-MCNC: 15.9 G/DL (ref 14–18)
IMM GRANULOCYTES # BLD AUTO: 0.05 K/UL (ref 0–0.04)
IMM GRANULOCYTES NFR BLD AUTO: 0.7 % (ref 0–0.5)
LDLC SERPL CALC-MCNC: 97.2 MG/DL (ref 63–159)
LYMPHOCYTES # BLD AUTO: 2 K/UL (ref 1–4.8)
LYMPHOCYTES NFR BLD: 28.9 % (ref 18–48)
MCH RBC QN AUTO: 31.8 PG (ref 27–31)
MCHC RBC AUTO-ENTMCNC: 33.1 G/DL (ref 32–36)
MCV RBC AUTO: 96 FL (ref 82–98)
MICROALBUMIN UR DL<=1MG/L-MCNC: <5 UG/ML
MONOCYTES # BLD AUTO: 0.6 K/UL (ref 0.3–1)
MONOCYTES NFR BLD: 9.3 % (ref 4–15)
NEUTROPHILS # BLD AUTO: 4 K/UL (ref 1.8–7.7)
NEUTROPHILS NFR BLD: 58.5 % (ref 38–73)
NONHDLC SERPL-MCNC: 114 MG/DL
NRBC BLD-RTO: 0 /100 WBC
PLATELET # BLD AUTO: 197 K/UL (ref 150–450)
PMV BLD AUTO: 10.3 FL (ref 9.2–12.9)
POTASSIUM SERPL-SCNC: 4.2 MMOL/L (ref 3.5–5.1)
PROT SERPL-MCNC: 7.6 G/DL (ref 6–8.4)
RBC # BLD AUTO: 5 M/UL (ref 4.6–6.2)
SODIUM SERPL-SCNC: 140 MMOL/L (ref 136–145)
TRIGL SERPL-MCNC: 84 MG/DL (ref 30–150)
TSH SERPL DL<=0.005 MIU/L-ACNC: 1.53 UIU/ML (ref 0.4–4)
WBC # BLD AUTO: 6.88 K/UL (ref 3.9–12.7)

## 2024-06-19 PROCEDURE — 80061 LIPID PANEL: CPT | Performed by: INTERNAL MEDICINE

## 2024-06-19 PROCEDURE — 83036 HEMOGLOBIN GLYCOSYLATED A1C: CPT | Performed by: INTERNAL MEDICINE

## 2024-06-19 PROCEDURE — 84443 ASSAY THYROID STIM HORMONE: CPT | Performed by: INTERNAL MEDICINE

## 2024-06-19 PROCEDURE — 36415 COLL VENOUS BLD VENIPUNCTURE: CPT | Mod: PO | Performed by: INTERNAL MEDICINE

## 2024-06-19 PROCEDURE — 82570 ASSAY OF URINE CREATININE: CPT | Performed by: INTERNAL MEDICINE

## 2024-06-19 PROCEDURE — 85025 COMPLETE CBC W/AUTO DIFF WBC: CPT | Performed by: INTERNAL MEDICINE

## 2024-06-19 PROCEDURE — 80053 COMPREHEN METABOLIC PANEL: CPT | Performed by: INTERNAL MEDICINE

## 2024-06-26 ENCOUNTER — TELEPHONE (OUTPATIENT)
Dept: INTERNAL MEDICINE | Facility: CLINIC | Age: 84
End: 2024-06-26
Payer: MEDICARE

## 2024-06-26 NOTE — TELEPHONE ENCOUNTER
----- Message from Marion Mesa sent at 6/26/2024  1:39 PM CDT -----  Contact: JEROME HINKLE [[id number]]  .Type:  Patient Returning Call    Who Called:JEROME HINKLE [[id number]]  Who Left Message for Patient:   Does the patient know what this is regarding?:   Would the patient rather a call back or a response via [facility]? call  Best Call Back Number: .[phone]  Additional Information:

## 2024-06-26 NOTE — TELEPHONE ENCOUNTER
Daughter states/father has an appt with  today and she feels he will cancel it/ but wanted  to know something is not right with him, she said they can not get him out the house, he sleeps the three days he is off of work at [employer], he's not taking care of himself (brushing teeth, bathing, etc..) His Grand daughter and his son are the ones here closest to him and they say he has no energy, no want to, no anything he always says he dont feel good and sometimes calls 911 but when they arrive, he is always OK then when they leave, he calls them to come back again. She is very concerned aout his mental health, if she or her brother suggest any thing he refuses, if  was to suggest anything he would be listen. Only to .  she wants help cause she knows something isnt right with him.is there any test to check his mental status?  She wanted you to know. . PAUL

## 2024-06-26 NOTE — TELEPHONE ENCOUNTER
----- Message from Diamone Speed sent at 6/26/2024  1:26 PM CDT -----  Regarding: daughter Ms Pascual  Type: Patient Call Back       Who called:daughter        What is the request in detail: is real concern about her father and  feel like he isn't tell Dr Zarate everting that he is going through and is very concern about her father and needs a call back        Can the clinic reply by [facility]? Yes       Would the patient rather a call back or a response via [facility]? Call back       Best call back number: [phone] Ms Pascual         Additional Information:Daughter stated she talk to Dr Zarate about this and feel like it is getting worst  with her father and really would like someone to help.

## 2024-06-26 NOTE — TELEPHONE ENCOUNTER
Called Samara back Pts daughter she asked me to call Mr. Jessica/ I called him and he said he just didn't feel like driving to [place] from [place]/ and said he would reschedule at another time/ FYLAURA

## 2024-07-08 ENCOUNTER — TELEPHONE (OUTPATIENT)
Dept: INTERNAL MEDICINE | Facility: CLINIC | Age: 84
End: 2024-07-08
Payer: MEDICARE

## 2024-07-08 NOTE — TELEPHONE ENCOUNTER
----- Message from Ana Drake sent at 7/8/2024 11:01 AM CDT -----  Contact: self   .Type: Patient Call Back        Who called:   Patients daughter      What is the request in detail:    Called in concerning upcoming appt . Patient daughter wanted to know if  the appt is necessary. If not  please let her know   Can the clinic reply by [facility]?           Would the patient rather a call back or a response via [facility]?   both       Best call back number:  [phone]

## 2024-07-29 RX ORDER — APIXABAN 5 MG/1
TABLET, FILM COATED ORAL
Qty: 180 TABLET | Refills: 3 | Status: SHIPPED | OUTPATIENT
Start: 2024-07-29

## 2024-07-29 RX ORDER — SIMVASTATIN 40 MG/1
TABLET, FILM COATED ORAL
Qty: 90 TABLET | Refills: 3 | Status: SHIPPED | OUTPATIENT
Start: 2024-07-29

## 2024-07-29 NOTE — TELEPHONE ENCOUNTER
Requested Prescriptions     Pending Prescriptions Disp Refills    simvastatin (ZOCOR) 40 MG tablet [Pharmacy Med Name: Simvastatin 40 MG Oral Tablet] 90 tablet 0     Sig: Take 1 tablet by mouth once daily    ELIQUIS 5 mg Tab [Pharmacy Med Name: Eliquis 5 MG Oral Tablet] 180 tablet 0     Sig: Take 1 tablet by mouth twice daily     LV 12/12/2023  LF 08/21/2023

## 2024-08-14 ENCOUNTER — OFFICE VISIT (OUTPATIENT)
Dept: INTERNAL MEDICINE | Facility: CLINIC | Age: 84
End: 2024-08-14
Payer: MEDICARE

## 2024-08-14 DIAGNOSIS — F32.1 CURRENT MODERATE EPISODE OF MAJOR DEPRESSIVE DISORDER WITHOUT PRIOR EPISODE: ICD-10-CM

## 2024-08-14 DIAGNOSIS — E78.5 HYPERLIPIDEMIA ASSOCIATED WITH TYPE 2 DIABETES MELLITUS: Primary | ICD-10-CM

## 2024-08-14 DIAGNOSIS — I48.0 PAF (PAROXYSMAL ATRIAL FIBRILLATION): ICD-10-CM

## 2024-08-14 DIAGNOSIS — E11.69 HYPERLIPIDEMIA ASSOCIATED WITH TYPE 2 DIABETES MELLITUS: Primary | ICD-10-CM

## 2024-08-14 DIAGNOSIS — I15.2 HYPERTENSION ASSOCIATED WITH DIABETES: ICD-10-CM

## 2024-08-14 DIAGNOSIS — E11.59 HYPERTENSION ASSOCIATED WITH DIABETES: ICD-10-CM

## 2024-08-14 DIAGNOSIS — E11.9 CONTROLLED TYPE 2 DIABETES MELLITUS WITHOUT COMPLICATION, WITHOUT LONG-TERM CURRENT USE OF INSULIN: ICD-10-CM

## 2024-08-14 DIAGNOSIS — I70.0 AORTIC ATHEROSCLEROSIS: ICD-10-CM

## 2024-08-14 DIAGNOSIS — F41.9 ANXIETY: ICD-10-CM

## 2024-08-14 PROCEDURE — 99214 OFFICE O/P EST MOD 30 MIN: CPT | Mod: 95,,, | Performed by: INTERNAL MEDICINE

## 2024-08-14 PROCEDURE — G2211 COMPLEX E/M VISIT ADD ON: HCPCS | Mod: 95,,, | Performed by: INTERNAL MEDICINE

## 2024-08-14 RX ORDER — ARIPIPRAZOLE 2 MG/1
2 TABLET ORAL DAILY
Qty: 90 TABLET | Refills: 3 | Status: SHIPPED | OUTPATIENT
Start: 2024-08-14 | End: 2025-08-14

## 2024-08-14 NOTE — PROGRESS NOTES
The patient location is: home  The chief complaint leading to consultation is: Medical f/u  Visit type: Virtual visit with synchronous audio and video  Total time spent with patient: 15 min  Each patient to whom he or she provides medical services by telemedicine is:  (1) informed of the relationship between the physician and patient and the respective role of any other health care provider with respect to management of the patient; and (2) notified that he or she may decline to receive medical services by telemedicine and may withdraw from such care at any time.    HPI:  Patient is a 84-year-old gentleman who is seen via telemedicine.  He is accompanied by his daughter.  Patient states he still feels his fatigued and tired and all the time.  He still has trouble with his sleep.  Patient just feels depressed all the time.  He denies any problems with his diabetes.  His blood pressures been well controlled.  He has had no hypoglycemic problems.  He has had no problems with his AFib.  He denies any chest pains, shortness for breath or palpitations    Current meds have been verified and updated per the EMR  Exam:  He looks comfortable.  No signs of distress.    Lab Results   Component Value Date    WBC 6.88 06/19/2024    HGB 15.9 06/19/2024    HCT 48.1 06/19/2024     06/19/2024    CHOL 174 06/19/2024    TRIG 84 06/19/2024    HDL 60 06/19/2024    ALT 20 06/19/2024    AST 22 06/19/2024     06/19/2024    K 4.2 06/19/2024     06/19/2024    CREATININE 1.1 06/19/2024    BUN 20 06/19/2024    CO2 23 06/19/2024    TSH 1.527 06/19/2024    PSA 2.9 10/10/2017    INR 1.0 01/22/2019    HGBA1C 5.6 06/19/2024       Impression:  Major depressive disorder, chronic.  Patient will continue with the Lexapro but we will start him on Abilify as well  Diabetes, hypertension lipids all very well controlled on current therapy  Paroxysmal AFib, on Eliquis, asymptomatic  Patient Active Problem List   Diagnosis    Anxiety     Hyperlipidemia associated with type 2 diabetes mellitus    Hypertension associated with diabetes    Diabetes mellitus type II, controlled    Left inguinal hernia    History of colon polyps    BPH with obstruction/lower urinary tract symptoms    PAF (paroxysmal atrial fibrillation)    Aortic atherosclerosis    Other insomnia    Major depressive disorder, single episode, unspecified       Plan:  Orders Placed This Encounter    ARIPiprazole (ABILIFY) 2 MG Tab   Patient was started on Abilify 2 mg daily.  If not better in a month they will let me know.  Patient will see me in January with the above lab work.      This note is generated with speech recognition software and is subject to transcription error and sound alike phrases that may be missed by proofreading.

## 2024-10-18 ENCOUNTER — PATIENT MESSAGE (OUTPATIENT)
Dept: INTERNAL MEDICINE | Facility: CLINIC | Age: 84
End: 2024-10-18
Payer: MEDICARE

## 2024-11-15 NOTE — H&P (VIEW-ONLY)
"This patient called in about early filling her oxycodone 15mg. She filled 180 tabs on 11/1 and is having a thoracentesis on Tuesday. She reported she is already taking 2 tabs at a time because \"they told me I could\". I told her it is too early to refill and best that she call in on Tuesday after the procedure if she is in pain and we can talk Ian about an adjustment for a few days. She is worried about not having enough pills on Tuesday and getting a ride to the pharmacy to pick them up. But if she filled 180 pills on 11/1, she should not be running out, even with a dose increase for a few days after procedure. She agreed that she will call back in on Tuesday should she need additional medication for her pain following this procedure.  " [facility]  Cardiology  Consult Note    Patient Name: Bi Jessica  MRN: [id number]  Admission Date: 1/22/2019  Hospital Length of Stay: 0 days  Code Status: Full Code   Attending Provider: No att. providers found   Consulting Provider: Shyla Taveras NP  Primary Care Physician: Thee Zarate MD  Principal Problem:New onset a-fib    Patient information was obtained from patient, relative(s), past medical records and ER records.     Inpatient consult to Cardiology  Consult performed by: Shyla Taveras NP  Consult ordered by: Derik Stiles NP        Subjective:     Chief Complaint:  Syncope, new onset AFIB     HPI:   Mr. Jessica is a 78 year old male with PMHx of HTN, HLP, DM who presented to [facility] ED due to recent syncopal episode overnight. Patient reports getting up last PM/early this AM and feeling weak and having syncopal episode afterward. Initial EKG revealed new onset AFIB, rate 89. ECHO pending. IV heparin gtt to start in ED for CVA prophylaxis. Labs pending at this time. Hospital medicine called for admission.  Cardiology consulted due to new onset AFIB. Patient seen and examined in ED. Denies chest pain or anginal equivalents at time of exam. No shortness of breath or VILLALOBOS. He does report episodes of palpitations but not bothersome. He took 2 Tylenol PM and 2 Melatonin yesterday PM to assist with sleep. Trace LE edema to day on exam. ECHO pending. Will keep NPO after MN for possible GANESH/DCCV. Further recs to follow.     Past Medical History:   Diagnosis Date    Anxiety     BPH with obstruction/lower urinary tract symptoms     Depression     Diabetes mellitus type II, controlled     Hyperlipidemia associated with type 2 diabetes mellitus     Hypertension associated with diabetes        Past Surgical History:   Procedure Laterality Date    COLONOSCOPY N/A 5/1/2017    Performed by Vishal Andrade MD [facility]    TONSILLECTOMY         Review of patient's allergies indicates:    Allergen Reactions    Codeine        No current facility-administered medications on file prior to encounter.      Current Outpatient Medications on File Prior to Encounter   Medication Sig    aspirin (ECOTRIN) 81 MG EC tablet Take 81 mg by mouth once daily.    calcium-vitamin D3 500 mg(1,250mg) -200 unit per tablet Take 1 tablet by mouth every evening.    cyanocobalamin (VITAMIN B-12) 1000 MCG tablet Take 100 mcg by mouth once daily.    fish oil-omega-3 fatty acids 300-1,000 mg capsule Take 2 g by mouth once daily.    FLAXSEED OIL ORAL Take by mouth.    lisinopril-hydrochlorothiazide (PRINZIDE,ZESTORETIC) 20-12.5 mg per tablet TAKE ONE TABLET BY MOUTH ONCE DAILY    metFORMIN (GLUCOPHAGE) 850 MG tablet TAKE ONE TABLET BY MOUTH ONCE DAILY    simvastatin (ZOCOR) 40 MG tablet TAKE ONE TABLET BY MOUTH ONCE DAILY    ondansetron (ZOFRAN-ODT) 4 MG TbDL Take 1 tablet (4 mg total) by mouth every 8 (eight) hours as needed (tid prn nausea).    sildenafil (REVATIO) 20 mg Tab Take 1-4 pills one hour prior to intercourse    vitamin E 1000 UNIT capsule Take 1,000 Units by mouth once daily.     Family History     Problem Relation (Age of Onset)    Cancer Brother    Heart disease Mother, Father, Brother        Tobacco Use    Smoking status: Former Smoker   Substance and Sexual Activity    Alcohol use: Yes     Alcohol/week: 0.6 oz     Types: 1 Glasses of wine per week     Comment: occasionally     Drug use: No    Sexual activity: No     Review of Systems   Constitution: Negative for weakness.   HENT: Negative for hearing loss and hoarse voice.    Eyes: Negative for blurred vision and visual disturbance.   Cardiovascular: Positive for irregular heartbeat, leg swelling, palpitations and syncope. Negative for chest pain, claudication, dyspnea on exertion, near-syncope, orthopnea and paroxysmal nocturnal dyspnea.   Respiratory: Negative for cough, hemoptysis, shortness of breath, sleep disturbances due to breathing,  snoring and wheezing.    Endocrine: Negative for cold intolerance and heat intolerance.   Hematologic/Lymphatic: Does not bruise/bleed easily.   Skin: Negative for color change, dry skin and nail changes.   Musculoskeletal: Positive for arthritis. Negative for back pain, joint pain and myalgias.   Gastrointestinal: Negative for bloating, abdominal pain, constipation, nausea and vomiting.   Genitourinary: Negative for dysuria, flank pain, hematuria and hesitancy.   Neurological: Negative for headaches, light-headedness, loss of balance, numbness and paresthesias.   Psychiatric/Behavioral: Negative for altered mental status.   Allergic/Immunologic: Negative for environmental allergies.     Objective:     Vital Signs (Most Recent):  Temp: 98.7 °F (37.1 °C) (01/22/19 1320)  Pulse: 86 (01/22/19 1535)  Resp: 20 (01/22/19 1535)  BP: (!) 122/59 (01/22/19 1535)  SpO2: 98 % (01/22/19 1535) Vital Signs (24h Range):  Temp:  [97.9 °F (36.6 °C)-98.7 °F (37.1 °C)] 98.7 °F (37.1 °C)  Pulse:  [] 86  Resp:  [18-24] 20  SpO2:  [95 %-100 %] 98 %  BP: ()/(56-74) 122/59     Weight: 97.7 kg (215 lb 6.2 oz)  Body mass index is 33.73 kg/m².    SpO2: 98 %  O2 Device (Oxygen Therapy): room air    No intake or output data in the 24 hours ending 01/22/19 1639    Lines/Drains/Airways     Peripheral Intravenous Line                 Peripheral IV - Single Lumen 01/22/19 1621 Right Antecubital less than 1 day         Peripheral IV - Single Lumen 01/22/19 Left Forearm less than 1 day                Physical Exam   Constitutional: He is oriented to person, place, and time. He appears well-developed and well-nourished. No distress.   HENT:   Head: Normocephalic and atraumatic.   Neck: Normal range of motion and full passive range of motion without pain. Neck supple. No JVD present.   Cardiovascular: Normal rate, S1 normal, S2 normal and intact distal pulses. An irregularly irregular rhythm present. PMI is not displaced. Exam reveals no  distant heart sounds.   No murmur heard.  Pulses:       Radial pulses are 2+ on the right side, and 2+ on the left side.        Dorsalis pedis pulses are 2+ on the right side, and 2+ on the left side.   New onset AFIB on monitor today   Pulmonary/Chest: Effort normal and breath sounds normal. No accessory muscle usage. No respiratory distress. He has no decreased breath sounds. He has no wheezes. He has no rales.   Musculoskeletal: Normal range of motion. He exhibits edema (trace LE).        Right ankle: He exhibits swelling.        Left ankle: He exhibits swelling.   Neurological: He is alert and oriented to person, place, and time.   Skin: Skin is warm and dry. He is not diaphoretic. No cyanosis. Nails show no clubbing.   Psychiatric: He has a normal mood and affect. His speech is normal and behavior is normal. Judgment and thought content normal. Cognition and memory are normal.   Nursing note and vitals reviewed.      Significant Labs: BMP: No results for input(s): GLU, NA, K, CL, CO2, BUN, CREATININE, CALCIUM, MG in the last 48 hours., CMP No results for input(s): NA, K, CL, CO2, GLU, BUN, CREATININE, CALCIUM, PROT, ALBUMIN, BILITOT, ALKPHOS, AST, ALT, ANIONGAP, ESTGFRAFRICA, EGFRNONAA in the last 48 hours., CBC No results for input(s): WBC, HGB, HCT, PLT in the last 48 hours., INR No results for input(s): INR, PROTIME in the last 48 hours., Lipid Panel No results for input(s): CHOL, HDL, LDLCALC, TRIG, CHOLHDL in the last 48 hours., Troponin No results for input(s): TROPONINI in the last 48 hours. and All pertinent lab results from the last 24 hours have been reviewed.    Significant Imaging: Echocardiogram: 2D echo with color flow doppler: No results found for this or any previous visit. and X-Ray: CXR: X-Ray Chest 1 View (CXR): No results found for this visit on 01/22/19. and X-Ray Chest PA and Lateral (CXR): No results found for this visit on 01/22/19.    Assessment and Plan:     * New onset a-fib     Patient presented to ED due to syncope and new onset AFIB on EKG upon arrival to ED  IV heparin gtt to be started for CVA prophylaxis today  Continue ASA, BB, IV heparin gtt for now  ECHO pending  Will keep NPO after MN for possible GANESH/DCCV in AM  No CNS complaints to suggest TIA or CVA today.  Has stigmata for Sleep apnea and needs MACK workup as OP  Further recs to follow in AM     Syncope    Telemetry monitoring overnight       Hyperlipidemia associated with type 2 diabetes mellitus    Continue statin         VTE Risk Mitigation (From admission, onward)        Ordered     heparin 25,000 units in dextrose 5% (100 units/ml) IV bolus from bag INITIAL BOLUS  Once      01/22/19 1528     heparin 25,000 units in dextrose 5% 250 mL (100 units/mL) infusion LOW INTENSITY nomogram - [facility]  Continuous      01/22/19 1528     heparin 25,000 units in dextrose 5% (100 units/ml) IV bolus from bag - ADDITIONAL PRN BOLUS - 60 units/kg  As needed (PRN)      01/22/19 1528     heparin 25,000 units in dextrose 5% (100 units/ml) IV bolus from bag - ADDITIONAL PRN BOLUS - 30 units/kg  As needed (PRN)      01/22/19 1528     IP VTE HIGH RISK PATIENT  Once      01/22/19 1348     Place sequential compression device  Until discontinued      01/22/19 1348          Thank you for your consult. I will follow-up with patient. Please contact us if you have any additional questions.    Shyla Taveras NP  Cardiology   [facility]

## 2024-12-09 ENCOUNTER — OFFICE VISIT (OUTPATIENT)
Dept: FAMILY MEDICINE | Facility: CLINIC | Age: 84
End: 2024-12-09
Payer: MEDICARE

## 2024-12-09 VITALS
BODY MASS INDEX: 33.08 KG/M2 | WEIGHT: 218.25 LBS | RESPIRATION RATE: 20 BRPM | HEART RATE: 44 BPM | OXYGEN SATURATION: 98 % | DIASTOLIC BLOOD PRESSURE: 65 MMHG | HEIGHT: 68 IN | SYSTOLIC BLOOD PRESSURE: 145 MMHG

## 2024-12-09 DIAGNOSIS — E11.9 CONTROLLED TYPE 2 DIABETES MELLITUS WITHOUT COMPLICATION, WITHOUT LONG-TERM CURRENT USE OF INSULIN: ICD-10-CM

## 2024-12-09 DIAGNOSIS — F32.1 CURRENT MODERATE EPISODE OF MAJOR DEPRESSIVE DISORDER WITHOUT PRIOR EPISODE: ICD-10-CM

## 2024-12-09 DIAGNOSIS — I48.0 PAF (PAROXYSMAL ATRIAL FIBRILLATION): ICD-10-CM

## 2024-12-09 DIAGNOSIS — I70.0 AORTIC ATHEROSCLEROSIS: ICD-10-CM

## 2024-12-09 DIAGNOSIS — R00.1 BRADYCARDIA: ICD-10-CM

## 2024-12-09 DIAGNOSIS — N40.1 BPH WITH OBSTRUCTION/LOWER URINARY TRACT SYMPTOMS: ICD-10-CM

## 2024-12-09 DIAGNOSIS — E11.59 HYPERTENSION ASSOCIATED WITH DIABETES: ICD-10-CM

## 2024-12-09 DIAGNOSIS — Z00.00 ENCOUNTER FOR PREVENTIVE HEALTH EXAMINATION: Primary | ICD-10-CM

## 2024-12-09 DIAGNOSIS — F41.9 ANXIETY: ICD-10-CM

## 2024-12-09 DIAGNOSIS — E78.5 HYPERLIPIDEMIA ASSOCIATED WITH TYPE 2 DIABETES MELLITUS: ICD-10-CM

## 2024-12-09 DIAGNOSIS — E11.69 HYPERLIPIDEMIA ASSOCIATED WITH TYPE 2 DIABETES MELLITUS: ICD-10-CM

## 2024-12-09 DIAGNOSIS — I15.2 HYPERTENSION ASSOCIATED WITH DIABETES: ICD-10-CM

## 2024-12-09 DIAGNOSIS — N13.8 BPH WITH OBSTRUCTION/LOWER URINARY TRACT SYMPTOMS: ICD-10-CM

## 2024-12-09 DIAGNOSIS — Z23 NEED FOR VACCINATION: ICD-10-CM

## 2024-12-09 PROCEDURE — 99999PBSHW FLU VACCINE - HIGH DOSE (65+) PRESERVATIVE FREE IM: Mod: PBBFAC,,,

## 2024-12-09 PROCEDURE — 99215 OFFICE O/P EST HI 40 MIN: CPT | Mod: PBBFAC,PO | Performed by: NURSE PRACTITIONER

## 2024-12-09 PROCEDURE — 99999 PR PBB SHADOW E&M-EST. PATIENT-LVL V: CPT | Mod: PBBFAC,,, | Performed by: NURSE PRACTITIONER

## 2024-12-09 PROCEDURE — G0439 PPPS, SUBSEQ VISIT: HCPCS | Mod: ,,, | Performed by: NURSE PRACTITIONER

## 2024-12-09 PROCEDURE — 90662 IIV NO PRSV INCREASED AG IM: CPT | Mod: PBBFAC,PO | Performed by: NURSE PRACTITIONER

## 2024-12-09 NOTE — PATIENT INSTRUCTIONS
Counseling and Referral of Other Preventative  (Italic type indicates deductible and co-insurance are waived)    Patient Name: Bi Jessica  Today's Date: 12/9/2024    Health Maintenance       Date Due Completion Date    RSV Vaccine (Age 60+ and Pregnant patients) (1 - 1-dose 75+ series) Never done ---    Foot Exam 06/05/2024 6/5/2023 (Done)    Override on 6/5/2023: Done    Override on 3/28/2022: Done    Override on 11/3/2020: Done    Override on 11/25/2019: Done    Override on 5/20/2019: Done    Override on 7/9/2015: Done    Influenza Vaccine (1) 09/01/2024 12/5/2023    COVID-19 Vaccine (1 - 2024-25 season) Never done ---    Hemoglobin A1c 12/19/2024 6/19/2024    Eye Exam 02/13/2025 2/13/2024    Diabetes Urine Screening 06/19/2025 6/19/2024    Lipid Panel 06/19/2025 6/19/2024    TETANUS VACCINE 01/22/2029 1/22/2019        No orders of the defined types were placed in this encounter.      The following information is provided to all patients.  This information is to help you find resources for any of the problems found today that may be affecting your health:                  Living healthy guide: www.[facility].[place].gov      Understanding Diabetes: www.diabetes.org      Eating healthy: www.cdc.gov/healthyweight      CDC home safety checklist: www.cdc.gov/steadi/patient.html      Agency on Aging: www.goea.[place].[facility]      Alcoholics anonymous (AA): www.aa.org      Physical Activity: www.[place].nih.gov/qq3bhbe      Tobacco use: www.quitwithusla.org

## 2024-12-09 NOTE — PROGRESS NOTES
"  Bi Jessica presented for a follow-up Medicare AWV today. The following components were reviewed and updated:    Medical history  Family History  Social history  Allergies and Current Medications  Health Risk Assessment  Health Maintenance  Care Team    **See Completed Assessments for Annual Wellness visit with in the encounter summary    The following assessments were completed:  Depression Screening  Cognitive function Screening  Timed Get Up Test  Whisper Test      Opioid documentation:      Patient does not have a current opioid prescription.          Vitals:    12/09/24 1015 12/09/24 1051   BP: 137/72 (!) 145/65   Patient Position: Sitting Sitting   Pulse: (!) 45 (!) 44   Resp: 20    SpO2: 98%    Weight: 99 kg (218 lb 4.1 oz)    Height: 5' 8" (1.727 m)      Body mass index is 33.19 kg/m².       Physical Exam  Vitals and nursing note reviewed.   Constitutional:       Appearance: Normal appearance.   HENT:      Head: Normocephalic and atraumatic.   Eyes:      Pupils: Pupils are equal, round, and reactive to light.   Cardiovascular:      Rate and Rhythm: Regular rhythm. Bradycardia present.      Comments: Heart in mid 40's  Pulmonary:      Effort: Pulmonary effort is normal.      Breath sounds: Normal breath sounds.   Musculoskeletal:         General: Normal range of motion.   Skin:     General: Skin is warm.   Neurological:      Mental Status: He is alert. Mental status is at baseline.   Psychiatric:         Mood and Affect: Mood normal.         Behavior: Behavior normal.         Thought Content: Thought content normal.         Judgment: Judgment normal.     Current Outpatient Medications   Medication Instructions    ACCU-CHEK GUIDE TEST STRIPS Strp 1 strip, Other, Daily    apixaban (ELIQUIS) 5 mg Tab Take 1 tablet by mouth twice daily    ARIPiprazole (ABILIFY) 2 mg, Oral, Daily    calcium-vitamin D3 500 mg(1,250mg) -200 unit per tablet 1 tablet, Nightly    cyanocobalamin (VITAMIN B-12) 100 mcg, Daily    " EScitalopram oxalate (LEXAPRO) 10 mg, Oral    fish oil-omega-3 fatty acids 300-1,000 mg capsule 2 g, Daily    FLAXSEED OIL ORAL Daily    lisinopriL (PRINIVIL,ZESTRIL) 20 MG tablet Take 1 tablet by mouth twice daily    magnesium 30 mg Tab Daily    metFORMIN (GLUCOPHAGE) 850 MG tablet Take 1 tablet by mouth once daily    potassium 99 mg Tab Daily    simvastatin (ZOCOR) 40 MG tablet Take 1 tablet by mouth once daily    vitamin E 1,000 Units, Daily    zinc gluconate 50 mg, Daily         Diagnoses and health risks identified today and associated recommendations/orders:  1. Encounter for preventive health examination    Pt to schedule to [facility] care Dr. Gardner 7.2025    2. Aortic atherosclerosis  Chronic and Ongoing on  Zocor Continue current treatment plan as previously prescribed with your PCP    3. Controlled type 2 diabetes mellitus without complication, without long-term current use of insulin  Chronic and Stable on Metformin/diet.   Continue current treatment plan as previously prescribed with your PCP      4. PAF (paroxysmal atrial fibrillation)  Ambulatory referral/consult to Cardiology  5. Bradycardia  Ambulatory referral/consult to Cardiology;  2019 card reportyoM here for AF management. He had a syncopal episode leading to admission 1/22/19. He took excess sleeping aids that day and had nocturnal micturation syncope. He was found to be in AF. GANESH revealed OSCAR thrombus. He spontaneously converted to NSR. He was placed on eliquis on discharge. No rate control agents were used due to bradycardia. He has had no subsequent syncopal events. His average rates are in the 50s but he was able to reach >100 bpm with activity. EF normal. CHADSVASC of Discuss with pt and dtg via phone  Heart 40-50  Denies dizziness,chest pain or fait ness  Decline ER  Appt with  Dr. Young 12/11/24     6. Hyperlipidemia associated with type 2 diabetes mellitus  Chronic and Stable on Zocior/diet Continue current treatment plan as previously  prescribed with your PCP    7. Hypertension associated with diabetes  Chronic and Ongoing on Lisinopril. Continue current treatment plan as previously prescribed with your PCP    8. Current moderate episode of major depressive disorder without prior episode  9. Anxiety  Chronic and Stable on Ablify/Lexapro. Continue current treatment plan as previously prescribed with your PCP    10. BPH with obstruction/lower urinary tract symptoms  Chronic and Stable. Continue current treatment plan as previously prescribed with your PCP    11. Need for vaccination  Influenza - High Dose (65+) (PF) (IM)  given    Provided Bi with a 5-10 year written screening schedule and personal prevention plan. Recommendations were developed using the USPSTF age appropriate recommendations. Education, counseling, and referrals were provided as needed.  After Visit Summary printed and given to patient which includes a list of additional screenings\tests needed.    Follow up in about 1 year (around 12/9/2025).    I offered to discuss advanced care planning, including how to pick a person who would make decisions for you if you were unable to make them for yourself, called a health care power of , and what kind of decisions you might make such as use of life sustaining treatments such as ventilators and tube feeding when faced with a life limiting illness recorded on a living will that they will need to know. (How you want to be cared for as you near the end of your natural life)     X  Patient has advanced directives on file, which we reviewed, and they do not wish to make changes.    Lissy Sher NP

## 2024-12-11 ENCOUNTER — CLINICAL SUPPORT (OUTPATIENT)
Dept: CARDIOLOGY | Facility: CLINIC | Age: 84
End: 2024-12-11
Payer: MEDICARE

## 2024-12-11 ENCOUNTER — OFFICE VISIT (OUTPATIENT)
Dept: CARDIOLOGY | Facility: CLINIC | Age: 84
End: 2024-12-11
Payer: MEDICARE

## 2024-12-11 VITALS
BODY MASS INDEX: 32.44 KG/M2 | SYSTOLIC BLOOD PRESSURE: 126 MMHG | OXYGEN SATURATION: 96 % | HEIGHT: 68 IN | DIASTOLIC BLOOD PRESSURE: 70 MMHG | WEIGHT: 214.06 LBS | HEART RATE: 60 BPM

## 2024-12-11 DIAGNOSIS — I48.0 PAF (PAROXYSMAL ATRIAL FIBRILLATION): Primary | ICD-10-CM

## 2024-12-11 DIAGNOSIS — I48.0 PAF (PAROXYSMAL ATRIAL FIBRILLATION): ICD-10-CM

## 2024-12-11 DIAGNOSIS — I77.89 OTHER SPECIFIED DISORDERS OF ARTERIES AND ARTERIOLES: ICD-10-CM

## 2024-12-11 DIAGNOSIS — R00.1 BRADYCARDIA: ICD-10-CM

## 2024-12-11 DIAGNOSIS — I49.5 BRADY-TACHY SYNDROME: Primary | ICD-10-CM

## 2024-12-11 DIAGNOSIS — I70.0 AORTIC ATHEROSCLEROSIS: ICD-10-CM

## 2024-12-11 PROCEDURE — 99211 OFF/OP EST MAY X REQ PHY/QHP: CPT | Mod: PBBFAC,25,27,PO

## 2024-12-11 PROCEDURE — 93005 ELECTROCARDIOGRAM TRACING: CPT | Mod: PBBFAC,PO | Performed by: INTERNAL MEDICINE

## 2024-12-11 PROCEDURE — 99214 OFFICE O/P EST MOD 30 MIN: CPT | Mod: PBBFAC,PO | Performed by: INTERNAL MEDICINE

## 2024-12-11 PROCEDURE — 93010 ELECTROCARDIOGRAM REPORT: CPT | Mod: S$PBB,,, | Performed by: INTERNAL MEDICINE

## 2024-12-11 PROCEDURE — 99204 OFFICE O/P NEW MOD 45 MIN: CPT | Mod: S$PBB,,, | Performed by: INTERNAL MEDICINE

## 2024-12-11 PROCEDURE — 99999 PR PBB SHADOW E&M-EST. PATIENT-LVL I: CPT | Mod: PBBFAC,,,

## 2024-12-11 PROCEDURE — 99999 PR PBB SHADOW E&M-EST. PATIENT-LVL IV: CPT | Mod: PBBFAC,,, | Performed by: INTERNAL MEDICINE

## 2024-12-11 NOTE — PROGRESS NOTES
Subjective:   Patient ID:  Bi Jessica is a 84 y.o. male who presents for evaluation of Fatigue        85 yo male, care re establish  PMH PAF on Eliquis, purvi, HTN, HLP, DM no h/o MI   C/o fatigue tired,   6 m ago, passed out at [facility] and declined EMS.   EKG reviewed by myself today reveals NSR nonspecific STT change, purvi at 44 bpm'  Reviewed EKGs in [facility], had sinus purvi at 44 bpm in 2021 2019 echo EF nml  No chest pain   SOB OE  No smoking drinking. BP LDL and A1c controled   Faithfully eliquis taking no active bleeding        No results found for this or any previous visit.     No results found for this or any previous visit.       Past Medical History:   Diagnosis Date    Anxiety     Atrial fibrillation     BPH with obstruction/lower urinary tract symptoms     Depression     Diabetes mellitus type II, controlled     DM (diabetes mellitus)     BS doesn't check 02/22/2021    Hyperlipidemia associated with type 2 diabetes mellitus     Hypertension associated with diabetes     Major depressive disorder, single episode, unspecified        Past Surgical History:   Procedure Laterality Date    CARDIOVERSION N/A 1/23/2019    Procedure: CARDIOVERSION;  Surgeon: Charbel Pandey MD;  Location: [facility] CATH LAB;  Service: Cardiology;  Laterality: N/A;    COLONOSCOPY N/A 5/1/2017    Procedure: COLONOSCOPY;  Surgeon: Vishal Andrade MD;  Location: [facility] ENDO;  Service: Endoscopy;  Laterality: N/A;    TONSILLECTOMY         Social History     Tobacco Use    Smoking status: Former    Smokeless tobacco: Never   Substance Use Topics    Alcohol use: Yes     Alcohol/week: 3.0 standard drinks of alcohol     Types: 3 Glasses of wine per week     Comment: occasionally     Drug use: No       Family History   Problem Relation Name Age of Onset    Heart disease Mother      Heart disease Father      Heart disease Brother      Cancer Brother         Review of Systems   Constitutional: Positive for malaise/fatigue. Negative for decreased  appetite, diaphoresis, fever and night sweats.   HENT:  Negative for nosebleeds.    Eyes:  Negative for blurred vision and double vision.   Cardiovascular:  Positive for syncope. Negative for chest pain, claudication, dyspnea on exertion, irregular heartbeat, leg swelling, near-syncope, orthopnea, palpitations and paroxysmal nocturnal dyspnea.   Respiratory:  Negative for cough, shortness of breath, sleep disturbances due to breathing, snoring, sputum production and wheezing.    Endocrine: Negative for cold intolerance and polyuria.   Hematologic/Lymphatic: Does not bruise/bleed easily.   Skin:  Negative for rash.   Musculoskeletal:  Negative for back pain, falls, joint pain, joint swelling and neck pain.   Gastrointestinal:  Negative for abdominal pain, heartburn, nausea and vomiting.   Genitourinary:  Negative for dysuria, frequency and hematuria.   Neurological:  Positive for dizziness. Negative for difficulty with concentration, focal weakness, headaches, light-headedness, numbness, seizures and weakness.   Psychiatric/Behavioral:  Negative for depression, memory loss and substance abuse. The patient does not have insomnia.    Allergic/Immunologic: Negative for HIV exposure and hives.       Objective:   Physical Exam  HENT:      Head: Normocephalic.   Eyes:      Pupils: Pupils are equal, round, and reactive to light.   Neck:      Thyroid: No thyromegaly.      Vascular: Normal carotid pulses. No carotid bruit or JVD.   Cardiovascular:      Rate and Rhythm: Regular rhythm. Bradycardia present. No extrasystoles are present.     Chest Wall: PMI is not displaced.      Pulses: Normal pulses.           Carotid pulses are 2+ on the right side and 2+ on the left side.     Heart sounds: Normal heart sounds. No murmur heard.     No gallop. No S3 sounds.   Pulmonary:      Effort: No respiratory distress.      Breath sounds: Normal breath sounds. No stridor.   Abdominal:      General: Bowel sounds are normal.       Palpations: Abdomen is soft.      Tenderness: There is no abdominal tenderness. There is no rebound.   Musculoskeletal:         General: Normal range of motion.   Skin:     Findings: No rash.   Neurological:      Mental Status: He is alert and oriented to person, place, and time.   Psychiatric:         Behavior: Behavior normal.         Lab Results   Component Value Date    CHOL 174 06/19/2024    CHOL 183 12/05/2023    CHOL 160 06/06/2023     Lab Results   Component Value Date    HDL 60 06/19/2024    HDL 65 12/05/2023    HDL 61 06/06/2023     Lab Results   Component Value Date    LDLCALC 97.2 06/19/2024    LDLCALC 103.6 12/05/2023    LDLCALC 86.8 06/06/2023     Lab Results   Component Value Date    TRIG 84 06/19/2024    TRIG 72 12/05/2023    TRIG 61 06/06/2023     Lab Results   Component Value Date    CHOLHDL 34.5 06/19/2024    CHOLHDL 35.5 12/05/2023    CHOLHDL 38.1 06/06/2023       Chemistry        Component Value Date/Time     06/19/2024 0858    K 4.2 06/19/2024 0858     06/19/2024 0858    CO2 23 06/19/2024 0858    BUN 20 06/19/2024 0858    CREATININE 1.1 06/19/2024 0858     (H) 06/19/2024 0858        Component Value Date/Time    CALCIUM 10.2 06/19/2024 0858    ALKPHOS 57 06/19/2024 0858    AST 22 06/19/2024 0858    ALT 20 06/19/2024 0858    BILITOT 0.8 06/19/2024 0858    ESTGFRAFRICA >60 04/08/2022 1822    EGFRNONAA 56 (A) 04/08/2022 1822          Lab Results   Component Value Date    HGBA1C 5.6 06/19/2024     Lab Results   Component Value Date    TSH 1.527 06/19/2024     Lab Results   Component Value Date    INR 1.0 01/22/2019    INR 1.1 02/02/2018    INR 1.0 01/08/2010     Lab Results   Component Value Date    WBC 6.88 06/19/2024    HGB 15.9 06/19/2024    HCT 48.1 06/19/2024    MCV 96 06/19/2024     06/19/2024     BNP  @LABRCNTIP(BNP,BNPTRIAGEBLO)@  CrCl cannot be calculated (Patient's most recent lab result is older than the maximum 7 days allowed.).  No results found in the last 24  hours.  No results found in the last 24 hours.  No results found in the last 24 hours.    Assessment:      1. Purvi-tachy syndrome    2. PAF (paroxysmal atrial fibrillation)    3. Bradycardia    4. Aortic atherosclerosis    5. Other specified disorders of arteries and arterioles      Chronic afib on SR and severe purvi  C/o faint syncope and weakness    Plan:   [facility] Echo for purvi and carotid US screen  Refer to EP for symptmatic tachy and purvi    Continue ELiquis 5 mg bid    I have reviewed all pertinent labs and cardiac studies independently. Plans and recommendations have been formulated under my direct supervision. All questions answered and patient voiced understanding.   If symptoms persist go to the ED  RTC in 6 months

## 2024-12-12 LAB
OHS QRS DURATION: 68 MS
OHS QTC CALCULATION: 372 MS

## 2025-01-07 ENCOUNTER — LAB VISIT (OUTPATIENT)
Dept: LAB | Facility: HOSPITAL | Age: 85
End: 2025-01-07
Attending: INTERNAL MEDICINE
Payer: MEDICARE

## 2025-01-07 DIAGNOSIS — E11.9 CONTROLLED TYPE 2 DIABETES MELLITUS WITHOUT COMPLICATION, WITHOUT LONG-TERM CURRENT USE OF INSULIN: ICD-10-CM

## 2025-01-07 LAB
ALBUMIN SERPL BCP-MCNC: 3.9 G/DL (ref 3.5–5.2)
ALP SERPL-CCNC: 52 U/L (ref 40–150)
ALT SERPL W/O P-5'-P-CCNC: 16 U/L (ref 10–44)
ANION GAP SERPL CALC-SCNC: 12 MMOL/L (ref 8–16)
AST SERPL-CCNC: 26 U/L (ref 10–40)
BASOPHILS # BLD AUTO: 0.08 K/UL (ref 0–0.2)
BASOPHILS NFR BLD: 1.1 % (ref 0–1.9)
BILIRUB SERPL-MCNC: 0.9 MG/DL (ref 0.1–1)
BUN SERPL-MCNC: 16 MG/DL (ref 8–23)
CALCIUM SERPL-MCNC: 10.5 MG/DL (ref 8.7–10.5)
CHLORIDE SERPL-SCNC: 108 MMOL/L (ref 95–110)
CHOLEST SERPL-MCNC: 165 MG/DL (ref 120–199)
CHOLEST/HDLC SERPL: 2.8 {RATIO} (ref 2–5)
CO2 SERPL-SCNC: 21 MMOL/L (ref 23–29)
CREAT SERPL-MCNC: 1.1 MG/DL (ref 0.5–1.4)
DIFFERENTIAL METHOD BLD: ABNORMAL
EOSINOPHIL # BLD AUTO: 0.2 K/UL (ref 0–0.5)
EOSINOPHIL NFR BLD: 2.2 % (ref 0–8)
ERYTHROCYTE [DISTWIDTH] IN BLOOD BY AUTOMATED COUNT: 12.4 % (ref 11.5–14.5)
EST. GFR  (NO RACE VARIABLE): >60 ML/MIN/1.73 M^2
ESTIMATED AVG GLUCOSE: 114 MG/DL (ref 68–131)
GLUCOSE SERPL-MCNC: 103 MG/DL (ref 70–110)
HBA1C MFR BLD: 5.6 % (ref 4–5.6)
HCT VFR BLD AUTO: 46.9 % (ref 40–54)
HDLC SERPL-MCNC: 59 MG/DL (ref 40–75)
HDLC SERPL: 35.8 % (ref 20–50)
HGB BLD-MCNC: 15.7 G/DL (ref 14–18)
IMM GRANULOCYTES # BLD AUTO: 0.03 K/UL (ref 0–0.04)
IMM GRANULOCYTES NFR BLD AUTO: 0.4 % (ref 0–0.5)
LDLC SERPL CALC-MCNC: 81.2 MG/DL (ref 63–159)
LYMPHOCYTES # BLD AUTO: 1.9 K/UL (ref 1–4.8)
LYMPHOCYTES NFR BLD: 25.7 % (ref 18–48)
MCH RBC QN AUTO: 31.7 PG (ref 27–31)
MCHC RBC AUTO-ENTMCNC: 33.5 G/DL (ref 32–36)
MCV RBC AUTO: 95 FL (ref 82–98)
MONOCYTES # BLD AUTO: 0.5 K/UL (ref 0.3–1)
MONOCYTES NFR BLD: 7 % (ref 4–15)
NEUTROPHILS # BLD AUTO: 4.6 K/UL (ref 1.8–7.7)
NEUTROPHILS NFR BLD: 63.6 % (ref 38–73)
NONHDLC SERPL-MCNC: 106 MG/DL
NRBC BLD-RTO: 0 /100 WBC
PLATELET # BLD AUTO: 205 K/UL (ref 150–450)
PMV BLD AUTO: 10 FL (ref 9.2–12.9)
POTASSIUM SERPL-SCNC: 4.6 MMOL/L (ref 3.5–5.1)
PROT SERPL-MCNC: 7.9 G/DL (ref 6–8.4)
RBC # BLD AUTO: 4.96 M/UL (ref 4.6–6.2)
SODIUM SERPL-SCNC: 141 MMOL/L (ref 136–145)
TRIGL SERPL-MCNC: 124 MG/DL (ref 30–150)
TSH SERPL DL<=0.005 MIU/L-ACNC: 1.4 UIU/ML (ref 0.4–4)
WBC # BLD AUTO: 7.25 K/UL (ref 3.9–12.7)

## 2025-01-07 PROCEDURE — 80061 LIPID PANEL: CPT | Performed by: INTERNAL MEDICINE

## 2025-01-07 PROCEDURE — 83036 HEMOGLOBIN GLYCOSYLATED A1C: CPT | Performed by: INTERNAL MEDICINE

## 2025-01-07 PROCEDURE — 85025 COMPLETE CBC W/AUTO DIFF WBC: CPT | Performed by: INTERNAL MEDICINE

## 2025-01-07 PROCEDURE — 84443 ASSAY THYROID STIM HORMONE: CPT | Performed by: INTERNAL MEDICINE

## 2025-01-07 PROCEDURE — 36415 COLL VENOUS BLD VENIPUNCTURE: CPT | Mod: PO | Performed by: INTERNAL MEDICINE

## 2025-01-07 PROCEDURE — 80053 COMPREHEN METABOLIC PANEL: CPT | Performed by: INTERNAL MEDICINE

## 2025-01-14 ENCOUNTER — OFFICE VISIT (OUTPATIENT)
Dept: INTERNAL MEDICINE | Facility: CLINIC | Age: 85
End: 2025-01-14
Payer: MEDICARE

## 2025-01-14 ENCOUNTER — HOSPITAL ENCOUNTER (OUTPATIENT)
Dept: CARDIOLOGY | Facility: HOSPITAL | Age: 85
Discharge: HOME | End: 2025-01-14
Attending: INTERNAL MEDICINE
Payer: MEDICARE

## 2025-01-14 VITALS
DIASTOLIC BLOOD PRESSURE: 72 MMHG | DIASTOLIC BLOOD PRESSURE: 70 MMHG | HEIGHT: 71 IN | HEIGHT: 71 IN | SYSTOLIC BLOOD PRESSURE: 126 MMHG | WEIGHT: 214 LBS | SYSTOLIC BLOOD PRESSURE: 126 MMHG | WEIGHT: 214 LBS | BODY MASS INDEX: 29.96 KG/M2 | BODY MASS INDEX: 29.96 KG/M2

## 2025-01-14 VITALS
SYSTOLIC BLOOD PRESSURE: 102 MMHG | HEIGHT: 71 IN | TEMPERATURE: 97 F | HEART RATE: 64 BPM | WEIGHT: 214.31 LBS | DIASTOLIC BLOOD PRESSURE: 78 MMHG | OXYGEN SATURATION: 97 % | BODY MASS INDEX: 30 KG/M2

## 2025-01-14 DIAGNOSIS — F41.9 ANXIETY: ICD-10-CM

## 2025-01-14 DIAGNOSIS — I49.5 BRADY-TACHY SYNDROME: ICD-10-CM

## 2025-01-14 DIAGNOSIS — K40.90 LEFT INGUINAL HERNIA: ICD-10-CM

## 2025-01-14 DIAGNOSIS — I48.0 PAF (PAROXYSMAL ATRIAL FIBRILLATION): ICD-10-CM

## 2025-01-14 DIAGNOSIS — I77.89 OTHER SPECIFIED DISORDERS OF ARTERIES AND ARTERIOLES: ICD-10-CM

## 2025-01-14 DIAGNOSIS — F32.1 CURRENT MODERATE EPISODE OF MAJOR DEPRESSIVE DISORDER WITHOUT PRIOR EPISODE: ICD-10-CM

## 2025-01-14 DIAGNOSIS — N13.8 BPH WITH OBSTRUCTION/LOWER URINARY TRACT SYMPTOMS: ICD-10-CM

## 2025-01-14 DIAGNOSIS — E11.59 HYPERTENSION ASSOCIATED WITH DIABETES: ICD-10-CM

## 2025-01-14 DIAGNOSIS — R00.1 BRADYCARDIA: ICD-10-CM

## 2025-01-14 DIAGNOSIS — I70.0 AORTIC ATHEROSCLEROSIS: ICD-10-CM

## 2025-01-14 DIAGNOSIS — E11.9 CONTROLLED TYPE 2 DIABETES MELLITUS WITHOUT COMPLICATION, WITHOUT LONG-TERM CURRENT USE OF INSULIN: ICD-10-CM

## 2025-01-14 DIAGNOSIS — N40.1 BPH WITH OBSTRUCTION/LOWER URINARY TRACT SYMPTOMS: ICD-10-CM

## 2025-01-14 DIAGNOSIS — I15.2 HYPERTENSION ASSOCIATED WITH DIABETES: ICD-10-CM

## 2025-01-14 DIAGNOSIS — E11.69 HYPERLIPIDEMIA ASSOCIATED WITH TYPE 2 DIABETES MELLITUS: ICD-10-CM

## 2025-01-14 DIAGNOSIS — Z00.00 ROUTINE GENERAL MEDICAL EXAMINATION AT A HEALTH CARE FACILITY: Primary | ICD-10-CM

## 2025-01-14 DIAGNOSIS — E78.5 HYPERLIPIDEMIA ASSOCIATED WITH TYPE 2 DIABETES MELLITUS: ICD-10-CM

## 2025-01-14 DIAGNOSIS — Z86.0100 HISTORY OF COLON POLYPS: ICD-10-CM

## 2025-01-14 LAB
AORTIC ROOT ANNULUS: 3.69 CM
ASCENDING AORTA: 3.22 CM
AV INDEX (PROSTH): 0.7
AV MEAN GRADIENT: 4.4 MMHG
AV PEAK GRADIENT: 7.8 MMHG
AV VALVE AREA BY VELOCITY RATIO: 2.5 CM²
AV VALVE AREA: 2.4 CM²
AV VELOCITY RATIO: 0.71
BSA FOR ECHO PROCEDURE: 2.21 M2
CV ECHO LV RWT: 0.51 CM
DOP CALC AO PEAK VEL: 1.4 M/S
DOP CALC AO VTI: 25.3 CM
DOP CALC LVOT AREA: 3.5 CM2
DOP CALC LVOT DIAMETER: 2.1 CM
DOP CALC LVOT PEAK VEL: 1 M/S
DOP CALC LVOT STROKE VOLUME: 61.3 CM3
DOP CALC RVOT PEAK VEL: 0.73 M/S
DOP CALC RVOT VTI: 12.9 CM
DOP CALCLVOT PEAK VEL VTI: 17.7 CM
E WAVE DECELERATION TIME: 137.89 MSEC
E/A RATIO: 1.89
E/E' RATIO: 6.3 M/S
ECHO LV POSTERIOR WALL: 1.2 CM (ref 0.6–1.1)
EJECTION FRACTION: 50 %
FRACTIONAL SHORTENING: 34 % (ref 28–44)
INTERVENTRICULAR SEPTUM: 1.2 CM (ref 0.6–1.1)
IVC DIAMETER: 2.15 CM
IVRT: 82.78 MSEC
LA MAJOR: 6.15 CM
LA MINOR: 5.83 CM
LA WIDTH: 3.8 CM
LEFT ARM DIASTOLIC BLOOD PRESSURE: 70 MMHG
LEFT ARM SYSTOLIC BLOOD PRESSURE: 126 MMHG
LEFT ATRIUM SIZE: 3.47 CM
LEFT ATRIUM VOLUME INDEX: 30.9 ML/M2
LEFT ATRIUM VOLUME: 67.09 CM3
LEFT CBA DIAS: 12 CM/S
LEFT CBA SYS: 52 CM/S
LEFT CCA DIST DIAS: 6 CM/S
LEFT CCA DIST SYS: 49 CM/S
LEFT CCA MID DIAS: 18 CM/S
LEFT CCA MID SYS: 78 CM/S
LEFT CCA PROX DIAS: 13 CM/S
LEFT CCA PROX SYS: 94 CM/S
LEFT ECA DIAS: 20 CM/S
LEFT ECA SYS: 96 CM/S
LEFT ICA DIST DIAS: 14 CM/S
LEFT ICA DIST SYS: 43 CM/S
LEFT ICA MID DIAS: 11 CM/S
LEFT ICA MID SYS: 40 CM/S
LEFT ICA PROX DIAS: 6 CM/S
LEFT ICA PROX SYS: 37 CM/S
LEFT INTERNAL DIMENSION IN SYSTOLE: 3.1 CM (ref 2.1–4)
LEFT VENTRICLE DIASTOLIC VOLUME INDEX: 48 ML/M2
LEFT VENTRICLE DIASTOLIC VOLUME: 104.15 ML
LEFT VENTRICLE MASS INDEX: 97.7 G/M2
LEFT VENTRICLE SYSTOLIC VOLUME INDEX: 17.5 ML/M2
LEFT VENTRICLE SYSTOLIC VOLUME: 37.87 ML
LEFT VENTRICULAR INTERNAL DIMENSION IN DIASTOLE: 4.7 CM (ref 3.5–6)
LEFT VENTRICULAR MASS: 212 G
LEFT VERTEBRAL DIAS: 8 CM/S
LEFT VERTEBRAL SYS: 48 CM/S
LV LATERAL E/E' RATIO: 6.07 M/S
LV SEPTAL E/E' RATIO: 6.54 M/S
LVED V (TEICH): 104.15 ML
LVES V (TEICH): 37.87 ML
LVOT MG: 1.82 MMHG
LVOT MV: 0.61 CM/S
MV PEAK A VEL: 0.45 M/S
MV PEAK E VEL: 0.85 M/S
MV STENOSIS PRESSURE HALF TIME: 39.99 MS
MV VALVE AREA P 1/2 METHOD: 5.5 CM2
OHS CV CAROTID RIGHT ICA EDV HIGHEST: 19
OHS CV CAROTID ULTRASOUND LEFT ICA/CCA RATIO: 0.88
OHS CV CAROTID ULTRASOUND RIGHT ICA/CCA RATIO: 1.49
OHS CV PV CAROTID LEFT HIGHEST CCA: 94
OHS CV PV CAROTID LEFT HIGHEST ICA: 43
OHS CV PV CAROTID RIGHT HIGHEST CCA: 103
OHS CV PV CAROTID RIGHT HIGHEST ICA: 67
OHS CV US CAROTID LEFT HIGHEST EDV: 14
PISA TR MAX VEL: 2.42 M/S
PV MEAN GRADIENT: 1 MMHG
PV MV: 0.87 M/S
PV PEAK GRADIENT: 2 MMHG
PV PEAK VELOCITY: 1.33 M/S
RA PRESSURE ESTIMATED: 3 MMHG
RIGHT ARM DIASTOLIC BLOOD PRESSURE: 72 MMHG
RIGHT ARM SYSTOLIC BLOOD PRESSURE: 126 MMHG
RIGHT CBA DIAS: 7 CM/S
RIGHT CBA SYS: 40 CM/S
RIGHT CCA DIST DIAS: 7 CM/S
RIGHT CCA DIST SYS: 45 CM/S
RIGHT CCA MID DIAS: 6 CM/S
RIGHT CCA MID SYS: 63 CM/S
RIGHT CCA PROX DIAS: 16 CM/S
RIGHT CCA PROX SYS: 103 CM/S
RIGHT ECA DIAS: 9 CM/S
RIGHT ECA SYS: 89 CM/S
RIGHT ICA DIST DIAS: 19 CM/S
RIGHT ICA DIST SYS: 67 CM/S
RIGHT ICA MID DIAS: 19 CM/S
RIGHT ICA MID SYS: 54 CM/S
RIGHT ICA PROX DIAS: 16 CM/S
RIGHT ICA PROX SYS: 47 CM/S
RIGHT VERTEBRAL DIAS: 8 CM/S
RIGHT VERTEBRAL SYS: 33 CM/S
RV TB RVSP: 5 MMHG
SINUS: 3.37 CM
STJ: 3.41 CM
TDI LATERAL: 0.14 M/S
TDI SEPTAL: 0.13 M/S
TDI: 0.14 M/S
TR MAX PG: 23 MMHG
TRICUSPID ANNULAR PLANE SYSTOLIC EXCURSION: 1.99 CM
TV REST PULMONARY ARTERY PRESSURE: 26 MMHG
Z-SCORE OF LEFT VENTRICULAR DIMENSION IN END DIASTOLE: -4.24
Z-SCORE OF LEFT VENTRICULAR DIMENSION IN END SYSTOLE: -2.72

## 2025-01-14 PROCEDURE — 93306 TTE W/DOPPLER COMPLETE: CPT | Mod: 26,,, | Performed by: INTERNAL MEDICINE

## 2025-01-14 PROCEDURE — G2211 COMPLEX E/M VISIT ADD ON: HCPCS | Mod: S$PBB,,, | Performed by: INTERNAL MEDICINE

## 2025-01-14 PROCEDURE — 93306 TTE W/DOPPLER COMPLETE: CPT

## 2025-01-14 PROCEDURE — 99214 OFFICE O/P EST MOD 30 MIN: CPT | Mod: PBBFAC,PO | Performed by: INTERNAL MEDICINE

## 2025-01-14 PROCEDURE — 99215 OFFICE O/P EST HI 40 MIN: CPT | Mod: S$PBB,,, | Performed by: INTERNAL MEDICINE

## 2025-01-14 PROCEDURE — 93880 EXTRACRANIAL BILAT STUDY: CPT

## 2025-01-14 PROCEDURE — 99999 PR PBB SHADOW E&M-EST. PATIENT-LVL IV: CPT | Mod: PBBFAC,,, | Performed by: INTERNAL MEDICINE

## 2025-01-14 PROCEDURE — 93880 EXTRACRANIAL BILAT STUDY: CPT | Mod: 26,,, | Performed by: INTERNAL MEDICINE

## 2025-01-14 RX ORDER — METFORMIN HYDROCHLORIDE 500 MG/1
500 TABLET, EXTENDED RELEASE ORAL
COMMUNITY

## 2025-01-14 NOTE — PROGRESS NOTES
HPI:  Patient is 84-year-old male who comes in today for follow-up of his hypertension, lipids, diabetes, major depressive disorder, and for his annual physical.  Patient is been doing about the same.  He is still works 30+ hours a week at [employer].  He is still lives alone and takes care of all his own activities of daily living.  Patient denies any chest pains or shortness a breath.  Patient had 1 episode last summer where he got lightheaded and dizzy.  He sat down.  He never lost consciousness.  Patient recently was seen for his well as exam had a heart rate of 45.  He was referred to see Cardiology who has him scheduled to get a Holter monitor, echo and stress test.  He denies any hypoglycemic problems.  His blood pressure at home is well controlled.  He states since starting the Abilify his depression is also improved    Current MEDS: medcard review, verified and update  Allergies: Per the electronic medical record    Past Medical History:   Diagnosis Date    Anxiety     Atrial fibrillation     BPH with obstruction/lower urinary tract symptoms     Diabetes mellitus type II, controlled     Hyperlipidemia associated with type 2 diabetes mellitus     Hypertension associated with diabetes     Major depressive disorder, single episode, unspecified        Past Surgical History:   Procedure Laterality Date    CARDIOVERSION N/A 1/23/2019    Procedure: CARDIOVERSION;  Surgeon: Charbel Pandey MD;  Location: [facility] CATH LAB;  Service: Cardiology;  Laterality: N/A;    COLONOSCOPY N/A 5/1/2017    Procedure: COLONOSCOPY;  Surgeon: Vishal Andrade MD;  Location: [facility] ENDO;  Service: Endoscopy;  Laterality: N/A;    TONSILLECTOMY         SHx: per the electronic medical record    FHx: recorded in the electronic medical record    ROS:    denies any chest pains or shortness of breath. Denies any nausea, vomiting or diarrhea. Denies any fever, chills or sweats. Denies any change in weight, voice, stool, skin or hair. Denies any dysuria,  "dyspepsia or dysphagia. Denies any change in vision, hearing or headaches. Denies any swollen lymph nodes or loss of memory.    PE:  /78 (BP Location: Left arm, Patient Position: Sitting)   Pulse 64   Temp 97 °F (36.1 °C) (Tympanic)   Ht 5' 11" (1.803 m)   Wt 97.2 kg (214 lb 4.6 oz)   SpO2 97%   BMI 29.89 kg/m²   Gen: Well-developed, well-nourished, male, in no acute distress, oriented x3  HEENT: neck is supple, no adenopathy, carotids 2+ equal without bruits, thyroid exam normal size without nodules.  CHEST: clear to auscultation and percussion  CVS:  Heart rate regular at around 50.  without significant murmur, gallop, or rubs  ABD: soft, benign, no rebound no guarding, no distention.  Bowel sounds are normal.     nontender.  No palpable masses.  No organomegaly and no audible bruits.  RECTAL:  Deferred.  EXT: no clubbing, cyanosis, or edema  LYMPH: no cervical, inguinal, or axillary adenopathy  FEET: no loss of sensation.  No ulcers or pressure sores.  NEURO: gait normal.  Cranial nerves II- XII intact. No nystagmus.  Speech normal.   Gross motor and sensory unremarkable.    Lab Results   Component Value Date    WBC 7.25 01/07/2025    HGB 15.7 01/07/2025    HCT 46.9 01/07/2025     01/07/2025    CHOL 165 01/07/2025    TRIG 124 01/07/2025    HDL 59 01/07/2025    ALT 16 01/07/2025    AST 26 01/07/2025     01/07/2025    K 4.6 01/07/2025     01/07/2025    CREATININE 1.1 01/07/2025    BUN 16 01/07/2025    CO2 21 (L) 01/07/2025    TSH 1.399 01/07/2025    PSA 2.9 10/10/2017    INR 1.0 01/22/2019    HGBA1C 5.6 01/07/2025       Impression:  Diabetes, hypertension lipids all extremely well controlled  Major depressive disorder, stable on current meds  Sinus bradycardia, being evaluated for potential pacemaker  Patient Active Problem List   Diagnosis    Anxiety    Hyperlipidemia associated with type 2 diabetes mellitus    Hypertension associated with diabetes    Diabetes mellitus type II, " controlled    Left inguinal hernia    History of colon polyps    BPH with obstruction/lower urinary tract symptoms    PAF (paroxysmal atrial fibrillation)    Aortic atherosclerosis    Other insomnia    Major depressive disorder, single episode, unspecified    Marin-tachy syndrome       Plan:   Orders Placed This Encounter    Hemoglobin A1C    Comprehensive Metabolic Panel    Lipid Panel    TSH    Microalbumin/Creatinine Ratio, Urine     Patient will have the above lab work and be seen in 6 months.  Medications remain the same  This note is generated with speech recognition software and is subject to transcription error and sound alike phrases that may be missed by proofreading.

## 2025-01-16 ENCOUNTER — PATIENT MESSAGE (OUTPATIENT)
Dept: CARDIOLOGY | Facility: CLINIC | Age: 85
End: 2025-01-16
Payer: MEDICARE

## 2025-01-27 ENCOUNTER — TELEPHONE (OUTPATIENT)
Dept: INTERNAL MEDICINE | Facility: CLINIC | Age: 85
End: 2025-01-27
Payer: MEDICARE

## 2025-01-27 ENCOUNTER — HOSPITAL ENCOUNTER (OUTPATIENT)
Dept: RADIOLOGY | Facility: HOSPITAL | Age: 85
Discharge: HOME | End: 2025-01-27
Attending: STUDENT IN AN ORGANIZED HEALTH CARE EDUCATION/TRAINING PROGRAM
Payer: MEDICARE

## 2025-01-27 ENCOUNTER — OFFICE VISIT (OUTPATIENT)
Dept: FAMILY MEDICINE | Facility: CLINIC | Age: 85
End: 2025-01-27
Payer: MEDICARE

## 2025-01-27 VITALS
DIASTOLIC BLOOD PRESSURE: 60 MMHG | OXYGEN SATURATION: 96 % | BODY MASS INDEX: 28.7 KG/M2 | HEART RATE: 76 BPM | WEIGHT: 205 LBS | HEIGHT: 71 IN | SYSTOLIC BLOOD PRESSURE: 88 MMHG

## 2025-01-27 DIAGNOSIS — R53.83 FATIGUE, UNSPECIFIED TYPE: ICD-10-CM

## 2025-01-27 DIAGNOSIS — R41.0 CONFUSION: ICD-10-CM

## 2025-01-27 DIAGNOSIS — R06.02 SHORTNESS OF BREATH: ICD-10-CM

## 2025-01-27 DIAGNOSIS — Z79.899 ENCOUNTER FOR LONG-TERM (CURRENT) USE OF MEDICATIONS: ICD-10-CM

## 2025-01-27 DIAGNOSIS — I95.9 HYPOTENSION, UNSPECIFIED HYPOTENSION TYPE: ICD-10-CM

## 2025-01-27 DIAGNOSIS — Z00.00 ENCOUNTER FOR MEDICAL EXAMINATION TO ESTABLISH CARE: ICD-10-CM

## 2025-01-27 DIAGNOSIS — I95.9 HYPOTENSION, UNSPECIFIED HYPOTENSION TYPE: Primary | ICD-10-CM

## 2025-01-27 LAB
BILIRUB UR QL STRIP: NEGATIVE
CLARITY UR REFRACT.AUTO: CLEAR
COLOR UR AUTO: YELLOW
GLUCOSE UR QL STRIP: NEGATIVE
HGB UR QL STRIP: NEGATIVE
KETONES UR QL STRIP: ABNORMAL
LEUKOCYTE ESTERASE UR QL STRIP: NEGATIVE
NITRITE UR QL STRIP: NEGATIVE
PH UR STRIP: 6 [PH] (ref 5–8)
PROT UR QL STRIP: ABNORMAL
SP GR UR STRIP: >=1.03 (ref 1–1.03)
URN SPEC COLLECT METH UR: ABNORMAL

## 2025-01-27 PROCEDURE — 71046 X-RAY EXAM CHEST 2 VIEWS: CPT | Mod: 26,,, | Performed by: RADIOLOGY

## 2025-01-27 PROCEDURE — G2211 COMPLEX E/M VISIT ADD ON: HCPCS | Mod: S$PBB,,, | Performed by: STUDENT IN AN ORGANIZED HEALTH CARE EDUCATION/TRAINING PROGRAM

## 2025-01-27 PROCEDURE — 99999 PR PBB SHADOW E&M-EST. PATIENT-LVL IV: CPT | Mod: PBBFAC,,, | Performed by: STUDENT IN AN ORGANIZED HEALTH CARE EDUCATION/TRAINING PROGRAM

## 2025-01-27 PROCEDURE — 81003 URINALYSIS AUTO W/O SCOPE: CPT | Performed by: STUDENT IN AN ORGANIZED HEALTH CARE EDUCATION/TRAINING PROGRAM

## 2025-01-27 PROCEDURE — 71046 X-RAY EXAM CHEST 2 VIEWS: CPT | Mod: TC,PO

## 2025-01-27 PROCEDURE — 99215 OFFICE O/P EST HI 40 MIN: CPT | Mod: S$PBB,,, | Performed by: STUDENT IN AN ORGANIZED HEALTH CARE EDUCATION/TRAINING PROGRAM

## 2025-01-27 PROCEDURE — 99214 OFFICE O/P EST MOD 30 MIN: CPT | Mod: PBBFAC,25,PN | Performed by: STUDENT IN AN ORGANIZED HEALTH CARE EDUCATION/TRAINING PROGRAM

## 2025-01-27 RX ORDER — SIMVASTATIN 10 MG/1
1 TABLET, FILM COATED ORAL NIGHTLY
COMMUNITY

## 2025-01-27 RX ORDER — LISINOPRIL 20 MG/1
20 TABLET ORAL DAILY
COMMUNITY
Start: 2024-11-11

## 2025-01-27 NOTE — TELEPHONE ENCOUNTER
Spoke with pt's daughter, scheduled appt in [place] as requested for today. Daughter verbalized understanding.

## 2025-01-27 NOTE — PROGRESS NOTES
Patient ID: Bi Jessica is a 84 y.o. male.    Chief Complaint: Establish Care, Fatigue, and Shortness of Breath    History of Present Illness    CHIEF COMPLAINT:  Mr. Jessica presents today with weakness, confusion, and fatigue following recent illness.    HISTORY OF PRESENT ILLNESS:  He reports an illness beginning January 16th with initial cough and congestion, which are now improving with Mucinex DM. However, he continues to experience persistent fatigue impacting daily activities and conversations. He becomes tired with minimal exertion, particularly when walking. He denies fever, vomiting, or diarrhea associated with this illness. He has been on leave from work since the illness onset.    CARDIOVASCULAR:  He has a history of chronically low pulse. A recent wellness appointment noted a low pulse of 45, leading to carotid electrocardiogram and Holter monitor testing. He denies any other cardiac symptoms.    MEDICATIONS:  He takes Lisinopril 20 mg  and Metformin. He expresses uncertainty about the medication regimen but states trust in the prescribing physician.      ROS:  General: -fever, -chills, +fatigue, -weight gain, -weight loss  Eyes: -vision changes, -redness, -discharge  ENT: -ear pain, +nasal congestion, -sore throat  Cardiovascular: -chest pain, -palpitations, -lower extremity edema  Respiratory: +cough, -shortness of breath  Gastrointestinal: -abdominal pain, -nausea, -vomiting, -diarrhea, -constipation, -blood in stool  Genitourinary: -dysuria, -hematuria, -frequency  Musculoskeletal: -joint pain, -muscle pain  Skin: -rash, -lesion  Neurological: -headache, -dizziness, -numbness, -tingling, +weakness  Psychiatric: -anxiety, -depression, -sleep difficulty         Pmh, Psh, Family Hx, Social Hx updated in Epic Tabs today.       1/14/2025    11:05 AM 12/9/2024    10:17 AM 12/5/2023     9:20 AM 10/11/2022    11:04 AM 10/5/2021    10:34 AM 9/23/2020     1:52 PM 5/20/2019     7:45 AM   Depression Patient  Health Questionnaire   Over the last two weeks how often have you been bothered by little interest or pleasure in doing things Several days Not at all Several days Several days Not at all Several days Several days   Over the last two weeks how often have you been bothered by feeling down, depressed or hopeless Several days Not at all Several days Several days Not at all Several days Several days   PHQ-2 Total Score 2 0 2 2 0 2 2       Active Problem List with Overview Notes    Diagnosis Date Noted    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    Major depressive disorder, single episode, unspecified 06/05/2023    Other insomnia 10/11/2022    Aortic atherosclerosis 05/20/2019     CXR 1/23/19      PAF (paroxysmal atrial fibrillation) 05/14/2019    BPH with obstruction/lower urinary tract symptoms     History of colon polyps 04/30/2017     Last Colonoscopy in 2012      Left inguinal hernia 06/09/2016     Surgical repair is scheduled      Hyperlipidemia associated with type 2 diabetes mellitus     Hypertension associated with diabetes     Diabetes mellitus type II, controlled     Anxiety        Past Medical History:   Diagnosis Date    Anxiety     Atrial fibrillation     BPH with obstruction/lower urinary tract symptoms     Diabetes mellitus type II, controlled     Hyperlipidemia associated with type 2 diabetes mellitus     Hypertension associated with diabetes     Major depressive disorder, single episode, unspecified        Past Surgical History:   Procedure Laterality Date    CARDIOVERSION N/A 1/23/2019    Procedure: CARDIOVERSION;  Surgeon: Charbel Pandey MD;  Location: [facility] CATH LAB;  Service: Cardiology;  Laterality: N/A;    COLONOSCOPY N/A 5/1/2017    Procedure: COLONOSCOPY;  Surgeon: Vishal Andrade MD;  Location: [facility] ENDO;  Service: Endoscopy;  Laterality: N/A;    TONSILLECTOMY         Family History   Problem Relation Name Age of Onset    Heart disease Mother      Heart disease Father      Heart disease Brother      Cancer  Brother         Social History     Socioeconomic History    Marital status:     Number of children: 2   Tobacco Use    Smoking status: Former    Smokeless tobacco: Never   Substance and Sexual Activity    Alcohol use: Yes     Alcohol/week: 3.0 standard drinks of alcohol     Types: 3 Glasses of wine per week     Comment: occasionally     Drug use: No    Sexual activity: Not Currently     Partners: Female     Social Drivers of Health     Financial Resource Strain: Low Risk  (12/9/2024)    Overall Financial Resource Strain (CARDIA)     Difficulty of Paying Living Expenses: Not very hard   Food Insecurity: No Food Insecurity (12/9/2024)    Hunger Vital Sign     Worried About Running Out of Food in the Last Year: Never true     Ran Out of Food in the Last Year: Never true   Transportation Needs: No Transportation Needs (12/9/2024)    PRAPARE - Transportation     Lack of Transportation (Medical): No     Lack of Transportation (Non-Medical): No   Physical Activity: Inactive (12/9/2024)    Exercise Vital Sign     Days of Exercise per Week: 0 days     Minutes of Exercise per Session: 0 min   Stress: No Stress Concern Present (12/9/2024)    [employer] - Occupational Stress Questionnaire     Feeling of Stress : Only a little   Housing Stability: Low Risk  (12/9/2024)    Housing Stability Vital Sign     Unable to Pay for Housing in the Last Year: No     Homeless in the Last Year: No       Current Outpatient Medications on File Prior to Visit   Medication Sig Dispense Refill    ACCU-CHEK GUIDE TEST STRIPS Strp 1 strip by Other route once daily. 100 strip 3    apixaban (ELIQUIS) 5 mg Tab Take 1 tablet by mouth twice daily 180 tablet 3    ARIPiprazole (ABILIFY) 2 MG Tab Take 1 tablet (2 mg total) by mouth once daily. 90 tablet 3    calcium-vitamin D3 500 mg(1,250mg) -200 unit per tablet Take 1 tablet by mouth every evening. Takes once week      cyanocobalamin (VITAMIN B-12) 1000 MCG tablet  Take 100 mcg by mouth once daily.      EScitalopram oxalate (LEXAPRO) 10 MG tablet Take 1 tablet by mouth once daily 90 tablet 3    fish oil-omega-3 fatty acids 300-1,000 mg capsule Take 2 g by mouth once daily.      FLAXSEED OIL ORAL Take by mouth once daily.      lisinopriL (PRINIVIL,ZESTRIL) 20 MG tablet Take 20 mg by mouth once daily.      metFORMIN (GLUCOPHAGE-XR) 500 MG ER 24hr tablet Take 500 mg by mouth daily with breakfast.      multivitamin with minerals tablet Take 1 tablet by mouth once daily.      vitamin E 1000 UNIT capsule Take 1,000 Units by mouth once daily. Takes once a week      zinc gluconate 50 mg tablet Take 50 mg by mouth once daily.      magnesium 30 mg Tab Take by mouth once daily. (Patient not taking: Reported on 1/27/2025)      simvastatin (ZOCOR) 10 MG tablet Take 1 tablet by mouth every evening.       No current facility-administered medications on file prior to visit.       Review of patient's allergies indicates:   Allergen Reactions    Codeine          Review of Systems    General - Well developed, alert and oriented in NAD  HEENT - normocephalic, no evidence of trauma, sclera white, EOMI  Neck - full range of motion  COR - regular rate and rhythm without murmurs or gallops  Lungs - Clear  Abdomen - soft, non-tender  Ext - no cyanosis, minimal edema, capillary reflux is <2 seconds  Neuro: alert to time,place and person.     Physical Exam     Assessment:     1. Hypotension, unspecified hypotension type    2. Fatigue, unspecified type    3. Shortness of breath    4. Confusion    5. Encounter for medical examination to establish care    6. Encounter for long-term (current) use of medications        LABS:   Lab Results   Component Value Date    HGBA1C 5.6 01/07/2025    HGBA1C 5.6 06/19/2024    HGBA1C 5.5 12/05/2023      Lab Results   Component Value Date    CHOL 165 01/07/2025    CHOL 174 06/19/2024    CHOL 183 12/05/2023     Lab Results   Component Value Date    LDLCALC 81.2 01/07/2025     LDLCALC 97.2 06/19/2024    LDLCALC 103.6 12/05/2023     Lab Results   Component Value Date    WBC 7.25 01/07/2025    HGB 15.7 01/07/2025    HCT 46.9 01/07/2025     01/07/2025    CHOL 165 01/07/2025    TRIG 124 01/07/2025    HDL 59 01/07/2025    ALT 16 01/07/2025    AST 26 01/07/2025     01/07/2025    K 4.6 01/07/2025     01/07/2025    CREATININE 1.1 01/07/2025    BUN 16 01/07/2025    CO2 21 (L) 01/07/2025    TSH 1.399 01/07/2025    PSA 2.9 10/10/2017    INR 1.0 01/22/2019    HGBA1C 5.6 01/07/2025       Plan:   Bi was seen today for establish care, fatigue and shortness of breath.    Diagnoses and all orders for this visit:    Hypotension, unspecified hypotension type  -     X-Ray Chest PA And Lateral; Future  -     Urinalysis, Reflex to Urine Culture Urine, Clean Catch  -     CBC W/ AUTO DIFFERENTIAL; Future  -     COMPREHENSIVE METABOLIC PANEL; Future    Fatigue, unspecified type  -     CBC W/ AUTO DIFFERENTIAL; Future    Shortness of breath  -     X-Ray Chest PA And Lateral; Future    Confusion  -     Urinalysis, Reflex to Urine Culture Urine, Clean Catch    Encounter for medical examination to establish care    Encounter for long-term (current) use of medications  -     Urinalysis, Reflex to Urine Culture Urine, Clean Catch  -     CBC W/ AUTO DIFFERENTIAL; Future        Assessment & Plan    IMPRESSION:  - Assessed patient presenting with weakness, confusion, and fatigue following recent illness  - Noted low blood pressure (88/60), potentially related to current antihypertensive regimen  - Considered outpatient management with close monitoring, despite initial consideration of ER referral for IV fluids  - Differentials include dehydration, medication side effects, and potential underlying infection  - Holter monitor results pending from recent cardiac workup with Dr. Zarate    LOW BLOOD PRESSURE:  - Measured the patient's blood pressure, which is low at 88/60.  - Evaluated that the blood  pressure is significantly low and requires immediate attention.  - Considered sending the patient to the ER for IV fluids due to low blood pressure, but decided to manage as outpatient after physical exam.  - Discontinued lisinopril 20 mg due to low blood pressure.  - Instructed the patient to stop taking antihypertensive medication (lisinopril 20mg) for now and maintain hydration.  - Emphasized the importance of proper hydration, especially given current low blood pressure.  - Discussed potential risks associated with low blood pressure, including dizziness and loss of consciousness.  - Instructed the patient to monitor for signs of worsening confusion, dizziness, or chest pain.  - Advised the patient to maintain a blood pressure log at home.  - Measured current blood pressure, which is low at 88/60.  - Assessed that the current blood pressure is too low.  - Instructed the patient to stop antihypertensive medication temporarily.  - Advised the patient to maintain a blood pressure log at home.  - Discontinued lisinopril 20 mg due to low blood pressure.  - Inquired about dizziness when standing up.  - Mr. Jessica denied experiencing dizziness.  - Discussed potential risks associated with low blood pressure, including dizziness and loss of consciousness.  - Instructed the patient to monitor for signs of worsening confusion, dizziness, or chest pain.  - Advised the patient to contact the office if experiencing worsening confusion, dizziness, or chest pain.  - Instructed the patient to go to the emergency department if there is any worsening confusion or dizziness.  - Emphasized the importance of proper hydration.  - Advised the patient to maintain hydration and drink water.  - Mr. Jessica to increase fluid intake, focusing on water consumption.    FATIGUE:  - Mr. Jessica reports weakness, fatigue, and sleeping excessively since January 16th.  - Mr. Jessica reports feeling tired when walking and unable to carry a  conversation.  - Acknowledged the fatigue and planned to do a full workup.  - Ordered labs and urinalysis to investigate the cause of fatigue.    DIABETES:  - Noted that the patient is on Metformin for diabetes.  - Continued Metformin as prescribed.    LABS:  - Ordered labs including CBC, electrolytes, and kidney function tests.    NUTRITION:  - Inquired about the patient's eating and drinking habits.  - Mr. Jessica reported trying to drink more water and some food intake.    FOLLOW UP:  - Scheduled a follow-up within 1 week to reassess condition.  - Instructed the patient to contact the office if experiencing worsening confusion, dizziness, or chest pain.  - Advised the patient to go to the ER if symptoms worsen significantly before next appointment.           Face to Face time with patient:  2:40 PM CST -      Each patient to whom he or she provides medical services by telemedicine is:  (1) informed of the relationship between the physician and patient and the respective role of any other health care provider with respect to management of the patient; and (2) notified that he or she may decline to receive medical services by telemedicine and may withdraw from such care at any time.    I spent a total of  45     minutes face to face and non-face to face on the date of this visit.This includes time preparing to see the patient (eg, review of tests, notes), obtaining and/or reviewing additional history from an independent historian and/or outside medical records, documenting clinical information in the electronic health record, independently interpreting results and/or communicating results to the patient/family/caregiver, or care coordinator.  Visit today included increased complexity associated with the care of the episodic problem addressed and managing the longitudinal care of the patient due to the serious and/or complex managed problem(s).    There are no Patient Instructions on file for this visit.    Follow up  in about 6 months (around 7/27/2025), or if symptoms worsen or fail to improve.  The ASCVD Risk score (Zohreh DK, et al., 2019) failed to calculate for the following reasons:    The 2019 ASCVD risk score is only valid for ages 40 to 79    This note was generated with the assistance of ambient listening technology. Verbal consent was obtained by the patient and accompanying visitor(s) for the recording of patient appointment to facilitate this note. I attest to having reviewed and edited the generated note for accuracy, though some syntax or spelling errors may persist. Please contact the author of this note for any clarification.

## 2025-01-27 NOTE — TELEPHONE ENCOUNTER
----- Message from [employer] sent at 1/27/2025  8:38 AM CST -----  Contact: oscar/daughter  Type:  Sooner Apoointment Request    Caller is requesting a sooner appointment.  Caller declined first available appointment listed below.  Caller will not accept being placed on the waitlist and is requesting a message be sent to doctor.  Name of Caller:oscar  When is the first available appointment? None populating   Symptoms: illlness/weakness/fever   Would the patient rather a call back or a response via [facility]? call  Best Call Back Number: [phone]   Additional Information:  requesting an urgent call back and appointment,

## 2025-01-28 ENCOUNTER — TELEPHONE (OUTPATIENT)
Dept: FAMILY MEDICINE | Facility: CLINIC | Age: 85
End: 2025-01-28
Payer: MEDICARE

## 2025-01-28 NOTE — TELEPHONE ENCOUNTER
----- Message from Naif sent at 1/28/2025  1:10 PM CST -----  Contact: Samara/ Daughter  .Type:  Needs Medical Advice    Who Called:  Samara     Would the patient rather a call back or a response via [facility]?  Call back     Best Call Back Number:  [phone] (Samara)    Additional Information:  Samara is calling in regard to inform the provider of pt blood pressure readings  100/64, 122/70, 118/62.  She would like to know if the appt scheduled on 01/30 needs to be kept since his readings are in better range and labs are in normal limits.  Samara also state pt is doing better     Thanks

## 2025-01-28 NOTE — TELEPHONE ENCOUNTER
"Per pt daughter   "Samara is calling in regard to inform the provider of pt blood pressure readings  100/64, 122/70, 118/62.  She would like to know if the appt scheduled on 01/30 needs to be kept since his readings are in better range and labs are in normal limits.  Samara also state pt is doing better " please advise   "

## 2025-01-30 ENCOUNTER — TELEPHONE (OUTPATIENT)
Dept: FAMILY MEDICINE | Facility: CLINIC | Age: 85
End: 2025-01-30
Payer: MEDICARE

## 2025-01-30 ENCOUNTER — PATIENT MESSAGE (OUTPATIENT)
Dept: FAMILY MEDICINE | Facility: CLINIC | Age: 85
End: 2025-01-30

## 2025-01-31 NOTE — TELEPHONE ENCOUNTER
----- Message from Miguel sent at 1/30/2025  4:20 PM CST -----  Type:  Needs Medical Advice    Who Called:  JEROME HINKLE [[id number]]  Symptoms (please be specific):    How long has patient had these symptoms:    Pharmacy name and phone #:    Would the patient rather a call back or a response via [facility]?   Best Call Back Number:  [phone]   Additional Information: Patient needs documents filled out for work. Patient will like to know if documents can be emailed or faxed

## 2025-02-04 DIAGNOSIS — E11.59 HYPERTENSION ASSOCIATED WITH DIABETES: Primary | ICD-10-CM

## 2025-02-04 DIAGNOSIS — I15.2 HYPERTENSION ASSOCIATED WITH DIABETES: Primary | ICD-10-CM

## 2025-02-04 DIAGNOSIS — E11.9 CONTROLLED TYPE 2 DIABETES MELLITUS WITHOUT COMPLICATION, WITHOUT LONG-TERM CURRENT USE OF INSULIN: ICD-10-CM

## 2025-02-04 RX ORDER — METFORMIN HYDROCHLORIDE 850 MG/1
TABLET ORAL
Qty: 90 TABLET | Refills: 0 | Status: SHIPPED | OUTPATIENT
Start: 2025-02-04

## 2025-02-04 RX ORDER — LISINOPRIL 20 MG/1
20 TABLET ORAL 2 TIMES DAILY
Qty: 180 TABLET | Refills: 0 | Status: SHIPPED | OUTPATIENT
Start: 2025-02-04

## 2025-02-05 ENCOUNTER — TELEPHONE (OUTPATIENT)
Dept: CARDIOLOGY | Facility: CLINIC | Age: 85
End: 2025-02-05
Payer: MEDICARE

## 2025-02-05 ENCOUNTER — PATIENT MESSAGE (OUTPATIENT)
Dept: FAMILY MEDICINE | Facility: CLINIC | Age: 85
End: 2025-02-05
Payer: MEDICARE

## 2025-02-05 NOTE — TELEPHONE ENCOUNTER
LVM to return call for holter results.    ----- Message from López Young MD sent at 2/5/2025  2:47 PM CST -----  The monitor showed frequent arrhythmia.   Continue current Rx.   F/U in 4 weeks

## 2025-02-11 ENCOUNTER — PATIENT MESSAGE (OUTPATIENT)
Dept: CARDIOLOGY | Facility: CLINIC | Age: 85
End: 2025-02-11
Payer: MEDICARE

## 2025-02-20 ENCOUNTER — TELEPHONE (OUTPATIENT)
Dept: LAB | Facility: HOSPITAL | Age: 85
End: 2025-02-20
Payer: MEDICARE

## 2025-02-20 NOTE — TELEPHONE ENCOUNTER
----- Message from [employer] sent at 2/20/2025  9:21 AM CST -----  Contact: Samara (Daughter)  ..Type:  Patient Requesting CallWho Called:Samara (Daughter)Would the patient rather a call back or a response via [facility]? CallFetch MD Call Back Number:.[phone] (home) Additional Information: Daughter called to discuss medical release forms for patient.

## 2025-02-20 NOTE — TELEPHONE ENCOUNTER
"Dr levy completed some paper work for my father however she did not put that it was medically necessary  his leave of abscess, if this was overlook we need to reopen that claim . I was wondering if dr levy can put on those paper that the leave of abscesses was necessary  because the claim got denied for that reason " please advise   "

## 2025-03-18 ENCOUNTER — TELEPHONE (OUTPATIENT)
Dept: FAMILY MEDICINE | Facility: CLINIC | Age: 85
End: 2025-03-18
Payer: MEDICARE

## 2025-03-18 NOTE — TELEPHONE ENCOUNTER
----- Message from Kristi sent at 3/18/2025  3:49 PM CDT -----  Contact: Bi Pat is calling in regards to get a wheel chair Please call him back at [phone]!

## 2025-03-20 ENCOUNTER — PATIENT MESSAGE (OUTPATIENT)
Dept: FAMILY MEDICINE | Facility: CLINIC | Age: 85
End: 2025-03-20
Payer: MEDICARE

## 2025-03-28 ENCOUNTER — PATIENT MESSAGE (OUTPATIENT)
Dept: FAMILY MEDICINE | Facility: CLINIC | Age: 85
End: 2025-03-28
Payer: MEDICARE

## 2025-04-15 DIAGNOSIS — E11.59 HYPERTENSION ASSOCIATED WITH DIABETES: ICD-10-CM

## 2025-04-15 DIAGNOSIS — I48.0 PAF (PAROXYSMAL ATRIAL FIBRILLATION): Primary | ICD-10-CM

## 2025-04-15 DIAGNOSIS — I70.0 AORTIC ATHEROSCLEROSIS: ICD-10-CM

## 2025-04-15 DIAGNOSIS — I15.2 HYPERTENSION ASSOCIATED WITH DIABETES: ICD-10-CM

## 2025-04-16 ENCOUNTER — OFFICE VISIT (OUTPATIENT)
Dept: CARDIOLOGY | Facility: CLINIC | Age: 85
End: 2025-04-16
Payer: MEDICARE

## 2025-04-16 ENCOUNTER — CLINICAL SUPPORT (OUTPATIENT)
Dept: CARDIOLOGY | Facility: CLINIC | Age: 85
End: 2025-04-16
Payer: MEDICARE

## 2025-04-16 VITALS
HEART RATE: 47 BPM | SYSTOLIC BLOOD PRESSURE: 138 MMHG | HEIGHT: 71 IN | WEIGHT: 217.13 LBS | DIASTOLIC BLOOD PRESSURE: 80 MMHG | OXYGEN SATURATION: 99 % | BODY MASS INDEX: 30.4 KG/M2

## 2025-04-16 DIAGNOSIS — E11.59 HYPERTENSION ASSOCIATED WITH DIABETES: ICD-10-CM

## 2025-04-16 DIAGNOSIS — I15.2 HYPERTENSION ASSOCIATED WITH DIABETES: ICD-10-CM

## 2025-04-16 DIAGNOSIS — I70.0 AORTIC ATHEROSCLEROSIS: ICD-10-CM

## 2025-04-16 DIAGNOSIS — E11.9 CONTROLLED TYPE 2 DIABETES MELLITUS WITHOUT COMPLICATION, WITHOUT LONG-TERM CURRENT USE OF INSULIN: ICD-10-CM

## 2025-04-16 DIAGNOSIS — E78.5 HYPERLIPIDEMIA ASSOCIATED WITH TYPE 2 DIABETES MELLITUS: ICD-10-CM

## 2025-04-16 DIAGNOSIS — I48.0 PAF (PAROXYSMAL ATRIAL FIBRILLATION): Primary | ICD-10-CM

## 2025-04-16 DIAGNOSIS — I48.0 PAF (PAROXYSMAL ATRIAL FIBRILLATION): ICD-10-CM

## 2025-04-16 DIAGNOSIS — I49.5 BRADY-TACHY SYNDROME: ICD-10-CM

## 2025-04-16 DIAGNOSIS — E11.69 HYPERLIPIDEMIA ASSOCIATED WITH TYPE 2 DIABETES MELLITUS: ICD-10-CM

## 2025-04-16 PROCEDURE — 99214 OFFICE O/P EST MOD 30 MIN: CPT | Mod: S$PBB,,, | Performed by: INTERNAL MEDICINE

## 2025-04-16 PROCEDURE — 99999 PR PBB SHADOW E&M-EST. PATIENT-LVL IV: CPT | Mod: PBBFAC,,, | Performed by: INTERNAL MEDICINE

## 2025-04-16 PROCEDURE — 99214 OFFICE O/P EST MOD 30 MIN: CPT | Mod: PBBFAC,PO | Performed by: INTERNAL MEDICINE

## 2025-04-16 PROCEDURE — 93010 ELECTROCARDIOGRAM REPORT: CPT | Mod: S$PBB,,, | Performed by: INTERNAL MEDICINE

## 2025-04-16 PROCEDURE — 93005 ELECTROCARDIOGRAM TRACING: CPT | Mod: PBBFAC,PO | Performed by: INTERNAL MEDICINE

## 2025-04-16 NOTE — PROGRESS NOTES
Subjective:   Patient ID:  Bi Jessica is a 84 y.o. male who presents for follow up of Follow-up        85 yo male, 3 m f/u  PMH PAF on Eliquis, purvi, HTN, HLP, DM no h/o MI   C/o fatigue tired,   6 m ago, passed out at [facility] and declined EMS.   EKG reviewed by myself today reveals NSR nonspecific STT change, purvi at 44 bpm'  Reviewed EKGs in [facility], had sinus purvi at 44 bpm in 2021 2019 echo EF nml  No chest pain   SOB OE  No smoking drinking. BP LDL and A1c controled   Faithfully eliquis taking no active bleeding    Interval history  01/25 VITALs showed 52% AFIB  The Echo showed normal function and mild valvular leaking.  Carotid artery US showed < 19% lesions  Off lisinopril  BP C            History of Present Illness    CHIEF COMPLAINT:  - Bi presents for follow-up to discuss cardiac health, particularly history of AFib and bradycardia.    HPI:  - Last seen 3 months ago with no ED visits or hospitalizations since  - Main concern: difficulty sleeping, with decreased energy levels  - Sleeps with 4 pillows and changes position frequently during sleep  - Underwent a 2-week heart monitor study in 12/2024, which showed intermittent AFib  - Reports discontinuing lisinopril  - Does not have a home BP monitor but has been checking BP at [facility], obtaining pulse readings of 46, 42, and 43  - Lives alone, manages all his ADLs independently  - Engages in some walking for exercise, though less than previously  - Reports difficulty standing for prolonged periods at work  - Denies dizziness, lightheadedness, syncope, dyspnea with activity at home, chest pain, swelling of lower extremities, active bleeding, blood in stool, bruising, awareness of AFib episodes, palpitations, and nocturnal dyspnea    CARDIAC HISTORY:  - Echo 01/2025: normal  - Carotid US: normal  - AFib, slow HR  - EKG 2025-04-16: NSR, PACs  - Holter monitor 12/2024: 2 weeks, intermittent AFib  - HR chronically slow, asymptomatic    MEDICATIONS:  -  Eliquis  - Metoprolol  - Atorvastatin  - Discontinued lisinopril    MEDICAL HISTORY:  - HTN  - HLD  - Diabetes    SOCIAL HISTORY:  - Occupation: Works at [employer]          Past Medical History:   Diagnosis Date    Anxiety     Atrial fibrillation     BPH with obstruction/lower urinary tract symptoms     Diabetes mellitus type II, controlled     Hyperlipidemia associated with type 2 diabetes mellitus     Hypertension associated with diabetes     Major depressive disorder, single episode, unspecified        Past Surgical History:   Procedure Laterality Date    CARDIOVERSION N/A 1/23/2019    Procedure: CARDIOVERSION;  Surgeon: Charbel Pandey MD;  Location: [facility] CATH LAB;  Service: Cardiology;  Laterality: N/A;    COLONOSCOPY N/A 5/1/2017    Procedure: COLONOSCOPY;  Surgeon: Vishal Andrade MD;  Location: [facility] ENDO;  Service: Endoscopy;  Laterality: N/A;    TONSILLECTOMY         Social History[1]    Family History   Problem Relation Name Age of Onset    Heart disease Mother      Heart disease Father      Heart disease Brother      Cancer Brother           ROS    Objective:   Physical Exam  HENT:      Head: Normocephalic.   Eyes:      Pupils: Pupils are equal, round, and reactive to light.   Neck:      Thyroid: No thyromegaly.      Vascular: Normal carotid pulses. No carotid bruit or JVD.   Cardiovascular:      Rate and Rhythm: Regular rhythm. Bradycardia present. Occasional Extrasystoles are present.     Chest Wall: PMI is not displaced.      Pulses: Normal pulses.           Carotid pulses are 2+ on the right side and 2+ on the left side.     Heart sounds: Normal heart sounds. No murmur heard.     No gallop. No S3 sounds.   Pulmonary:      Effort: No respiratory distress.      Breath sounds: Normal breath sounds. No stridor.   Abdominal:      General: Bowel sounds are normal.      Palpations: Abdomen is soft.      Tenderness: There is no abdominal tenderness. There is no rebound.   Musculoskeletal:         General: Normal  range of motion.   Skin:     Findings: No rash.   Neurological:      Mental Status: He is alert and oriented to person, place, and time.   Psychiatric:         Behavior: Behavior normal.       Lab Results   Component Value Date    CHOL 165 01/07/2025    CHOL 174 06/19/2024    CHOL 183 12/05/2023     Lab Results   Component Value Date    HDL 59 01/07/2025    HDL 60 06/19/2024    HDL 65 12/05/2023     Lab Results   Component Value Date    LDLCALC 81.2 01/07/2025    LDLCALC 97.2 06/19/2024    LDLCALC 103.6 12/05/2023     Lab Results   Component Value Date    TRIG 124 01/07/2025    TRIG 84 06/19/2024    TRIG 72 12/05/2023     Lab Results   Component Value Date    CHOLHDL 35.8 01/07/2025    CHOLHDL 34.5 06/19/2024    CHOLHDL 35.5 12/05/2023       Chemistry        Component Value Date/Time     01/27/2025 1604    K 4.4 01/27/2025 1604     01/27/2025 1604    CO2 21 (L) 01/27/2025 1604    BUN 25 (H) 01/27/2025 1604    CREATININE 1.1 01/27/2025 1604     (H) 01/27/2025 1604        Component Value Date/Time    CALCIUM 10.6 (H) 01/27/2025 1604    ALKPHOS 49 01/27/2025 1604    AST 26 01/27/2025 1604    ALT 23 01/27/2025 1604    BILITOT 0.6 01/27/2025 1604    ESTGFRAFRICA >60 04/08/2022 1822    EGFRNONAA 56 (A) 04/08/2022 1822          Lab Results   Component Value Date    HGBA1C 5.6 01/07/2025     Lab Results   Component Value Date    TSH 1.399 01/07/2025     Lab Results   Component Value Date    INR 1.0 01/22/2019    INR 1.1 02/02/2018    INR 1.0 01/08/2010     Lab Results   Component Value Date    WBC 11.17 01/27/2025    HGB 15.0 01/27/2025    HCT 46.7 01/27/2025    MCV 97 01/27/2025     01/27/2025     BMP  Sodium   Date Value Ref Range Status   01/27/2025 140 136 - 145 mmol/L Final     Potassium   Date Value Ref Range Status   01/27/2025 4.4 3.5 - 5.1 mmol/L Final     Chloride   Date Value Ref Range Status   01/27/2025 108 95 - 110 mmol/L Final     CO2   Date Value Ref Range Status   01/27/2025 21 (L)  23 - 29 mmol/L Final     BUN   Date Value Ref Range Status   01/27/2025 25 (H) 8 - 23 mg/dL Final     Creatinine   Date Value Ref Range Status   01/27/2025 1.1 0.5 - 1.4 mg/dL Final     Calcium   Date Value Ref Range Status   01/27/2025 10.6 (H) 8.7 - 10.5 mg/dL Final     Anion Gap   Date Value Ref Range Status   01/27/2025 11 8 - 16 mmol/L Final     eGFR if    Date Value Ref Range Status   04/08/2022 >60 >60 mL/min/1.73 m^2 Final     eGFR if non    Date Value Ref Range Status   04/08/2022 56 (A) >60 mL/min/1.73 m^2 Final     Comment:     Calculation used to obtain the estimated glomerular filtration  rate (eGFR) is the CKD-EPI equation.        BNP  @LABRCNTIP(BNP,BNPTRIAGEBLO)@  @LABRCNTIP(troponini)@  CrCl cannot be calculated (Patient's most recent lab result is older than the maximum 7 days allowed.).  No results found in the last 24 hours.  No results found in the last 24 hours.  No results found in the last 24 hours.    Assessment:      1. PAF (paroxysmal atrial fibrillation)    2. Marin-tachy syndrome    3. Hyperlipidemia associated with type 2 diabetes mellitus    4. Hypertension associated with diabetes    5. Aortic atherosclerosis    6. Controlled type 2 diabetes mellitus without complication, without long-term current use of insulin        Plan:     Assessment & Plan    IMPRESSION:  - Normal sinus rhythm on EKG with some PACs, slow heart rate (48 bpm) consistent with baseline.  - Current medication regimen effectively controls AFib, heart rate, blood pressure, and cholesterol.  - Pacemaker placement not currently indicated due to lack of significant symptoms.  - Noted chronic slow heart rate, will continue to monitor for any changes or symptom development.    ATRIAL FIBRILLATION:  - Referred patient to see EP doctor (appointment not yet scheduled).    HYPERTENSION:  - Informed patient about reliability of blood pressure machines at retail pharmacies and to continue  self-monitoring.  - Continued centipede for blood pressure control and cholesterol.  - Discontinued lisinopril.    HYPERLIPIDEMIA:  - Continued centipede for blood pressure control and cholesterol.    GENERAL HEALTH MANAGEMENT:  - Recommend increasing physical activity, such as walking at [employer].    SLEEP DISORDER:  - Follow up with family doctor (Dr. Devries) to discuss sleep issues for potential resources and assistance.          Continue eliquis and statin    This note was generated with the assistance of ambient listening technology. Verbal consent was obtained by the patient and accompanying visitor(s) for the recording of patient appointment to facilitate this note. I attest to having reviewed and edited the generated note for accuracy, though some syntax or spelling errors may persist. Please contact the author of this note for any clarification.            [1]   Social History  Tobacco Use    Smoking status: Former    Smokeless tobacco: Never   Substance Use Topics    Alcohol use: Yes     Alcohol/week: 3.0 standard drinks of alcohol     Types: 3 Glasses of wine per week     Comment: occasionally     Drug use: No

## 2025-04-17 LAB
OHS QRS DURATION: 90 MS
OHS QTC CALCULATION: 411 MS

## 2025-04-23 ENCOUNTER — PATIENT MESSAGE (OUTPATIENT)
Dept: FAMILY MEDICINE | Facility: CLINIC | Age: 85
End: 2025-04-23
Payer: MEDICARE

## 2025-06-16 ENCOUNTER — PATIENT MESSAGE (OUTPATIENT)
Dept: FAMILY MEDICINE | Facility: CLINIC | Age: 85
End: 2025-06-16
Payer: MEDICARE

## 2025-06-18 ENCOUNTER — TELEPHONE (OUTPATIENT)
Dept: FAMILY MEDICINE | Facility: CLINIC | Age: 85
End: 2025-06-18
Payer: MEDICARE

## 2025-06-18 NOTE — TELEPHONE ENCOUNTER
Copied from CRM #[id number]. Topic: Medications - Medication Status Check   >> Jun 17, 2025 10:43 AM Tricia wrote:  Type:  Needs Medical Advice    Who Called: Bi Jessica  Pharmacy name and phone #:    [facility] [place], [place] - [address]  [address] [address]  Phone: [phone] Fax: [phone]  Would the patient rather a call back or a response via [facility]? Call back  Best Call Back Number: [phone]  Additional Information: pt stating he is at the pharmacy now and they are stating they do not see a refill of his EScitalopram oxalate (LEXAPRO) 10 MG tablet and metFORMIN (GLUCOPHAGE) 850 MG tablet medications

## 2025-06-24 ENCOUNTER — OFFICE VISIT (OUTPATIENT)
Dept: FAMILY MEDICINE | Facility: CLINIC | Age: 85
End: 2025-06-24
Payer: MEDICARE

## 2025-06-24 ENCOUNTER — LAB VISIT (OUTPATIENT)
Dept: LAB | Facility: HOSPITAL | Age: 85
End: 2025-06-24
Attending: STUDENT IN AN ORGANIZED HEALTH CARE EDUCATION/TRAINING PROGRAM
Payer: MEDICARE

## 2025-06-24 VITALS
OXYGEN SATURATION: 96 % | DIASTOLIC BLOOD PRESSURE: 84 MMHG | HEART RATE: 61 BPM | SYSTOLIC BLOOD PRESSURE: 128 MMHG | BODY MASS INDEX: 30.34 KG/M2 | WEIGHT: 216.69 LBS | HEIGHT: 71 IN

## 2025-06-24 DIAGNOSIS — Z56.6 OTHER PHYSICAL AND MENTAL STRAIN RELATED TO WORK: ICD-10-CM

## 2025-06-24 DIAGNOSIS — E11.9 CONTROLLED TYPE 2 DIABETES MELLITUS WITHOUT COMPLICATION, WITHOUT LONG-TERM CURRENT USE OF INSULIN: ICD-10-CM

## 2025-06-24 DIAGNOSIS — E11.59 HYPERTENSION ASSOCIATED WITH DIABETES: Primary | ICD-10-CM

## 2025-06-24 DIAGNOSIS — G47.00 INSOMNIA, UNSPECIFIED TYPE: ICD-10-CM

## 2025-06-24 DIAGNOSIS — I15.2 HYPERTENSION ASSOCIATED WITH DIABETES: Primary | ICD-10-CM

## 2025-06-24 DIAGNOSIS — E78.5 HYPERLIPIDEMIA ASSOCIATED WITH TYPE 2 DIABETES MELLITUS: ICD-10-CM

## 2025-06-24 DIAGNOSIS — E11.69 HYPERLIPIDEMIA ASSOCIATED WITH TYPE 2 DIABETES MELLITUS: ICD-10-CM

## 2025-06-24 DIAGNOSIS — I48.0 PAF (PAROXYSMAL ATRIAL FIBRILLATION): ICD-10-CM

## 2025-06-24 DIAGNOSIS — F32.1 CURRENT MODERATE EPISODE OF MAJOR DEPRESSIVE DISORDER WITHOUT PRIOR EPISODE: ICD-10-CM

## 2025-06-24 LAB
ALBUMIN SERPL BCP-MCNC: 4 G/DL (ref 3.5–5.2)
ALP SERPL-CCNC: 52 UNIT/L (ref 40–150)
ALT SERPL W/O P-5'-P-CCNC: 13 UNIT/L (ref 10–44)
ANION GAP (OHS): 9 MMOL/L (ref 8–16)
AST SERPL-CCNC: 20 UNIT/L (ref 11–45)
BILIRUB SERPL-MCNC: 0.6 MG/DL (ref 0.1–1)
BUN SERPL-MCNC: 18 MG/DL (ref 8–23)
CALCIUM SERPL-MCNC: 10.1 MG/DL (ref 8.7–10.5)
CHLORIDE SERPL-SCNC: 108 MMOL/L (ref 95–110)
CHOLEST SERPL-MCNC: 144 MG/DL (ref 120–199)
CHOLEST/HDLC SERPL: 2.8 {RATIO} (ref 2–5)
CO2 SERPL-SCNC: 25 MMOL/L (ref 23–29)
CREAT SERPL-MCNC: 1.1 MG/DL (ref 0.5–1.4)
EAG (OHS): 111 MG/DL (ref 68–131)
GFR SERPLBLD CREATININE-BSD FMLA CKD-EPI: >60 ML/MIN/1.73/M2
GLUCOSE SERPL-MCNC: 93 MG/DL (ref 70–110)
HBA1C MFR BLD: 5.5 % (ref 4–5.6)
HDLC SERPL-MCNC: 52 MG/DL (ref 40–75)
HDLC SERPL: 36.1 % (ref 20–50)
LDLC SERPL CALC-MCNC: 71.8 MG/DL (ref 63–159)
NONHDLC SERPL-MCNC: 92 MG/DL
POTASSIUM SERPL-SCNC: 4.3 MMOL/L (ref 3.5–5.1)
PROT SERPL-MCNC: 7.6 GM/DL (ref 6–8.4)
SODIUM SERPL-SCNC: 142 MMOL/L (ref 136–145)
TRIGL SERPL-MCNC: 101 MG/DL (ref 30–150)

## 2025-06-24 PROCEDURE — 99214 OFFICE O/P EST MOD 30 MIN: CPT | Mod: S$PBB,,, | Performed by: STUDENT IN AN ORGANIZED HEALTH CARE EDUCATION/TRAINING PROGRAM

## 2025-06-24 PROCEDURE — 80053 COMPREHEN METABOLIC PANEL: CPT

## 2025-06-24 PROCEDURE — 99213 OFFICE O/P EST LOW 20 MIN: CPT | Mod: PBBFAC,PN | Performed by: STUDENT IN AN ORGANIZED HEALTH CARE EDUCATION/TRAINING PROGRAM

## 2025-06-24 PROCEDURE — G2211 COMPLEX E/M VISIT ADD ON: HCPCS | Mod: ,,, | Performed by: STUDENT IN AN ORGANIZED HEALTH CARE EDUCATION/TRAINING PROGRAM

## 2025-06-24 PROCEDURE — 99999 PR PBB SHADOW E&M-EST. PATIENT-LVL III: CPT | Mod: PBBFAC,,, | Performed by: STUDENT IN AN ORGANIZED HEALTH CARE EDUCATION/TRAINING PROGRAM

## 2025-06-24 PROCEDURE — 83036 HEMOGLOBIN GLYCOSYLATED A1C: CPT

## 2025-06-24 PROCEDURE — 80061 LIPID PANEL: CPT

## 2025-06-24 PROCEDURE — 36415 COLL VENOUS BLD VENIPUNCTURE: CPT | Mod: PN

## 2025-06-24 RX ORDER — METFORMIN HYDROCHLORIDE 850 MG/1
850 TABLET ORAL DAILY
Qty: 90 TABLET | Refills: 0 | Status: SHIPPED | OUTPATIENT
Start: 2025-06-24

## 2025-06-24 RX ORDER — SIMVASTATIN 10 MG/1
10 TABLET, FILM COATED ORAL NIGHTLY
Qty: 90 TABLET | Refills: 0 | Status: SHIPPED | OUTPATIENT
Start: 2025-06-24

## 2025-06-24 RX ORDER — ARIPIPRAZOLE 2 MG/1
2 TABLET ORAL DAILY
Qty: 90 TABLET | Refills: 3 | Status: SHIPPED | OUTPATIENT
Start: 2025-06-24 | End: 2026-06-24

## 2025-06-24 RX ORDER — ESCITALOPRAM OXALATE 10 MG/1
10 TABLET ORAL DAILY
Qty: 90 TABLET | Refills: 3 | Status: SHIPPED | OUTPATIENT
Start: 2025-06-24

## 2025-06-24 SDOH — SOCIAL DETERMINANTS OF HEALTH (SDOH): OTHER PHYSICAL AND MENTAL STRAIN RELATED TO WORK: Z56.6

## 2025-06-24 NOTE — LETTER
June 24, 2025      [facility]  [address] [address] [address]  Phone: [phone]  Fax: [phone]       Patient: Bi Jessica   YOB: 1940  Date of Visit: 06/24/2025    To Whom It May Concern:    Lorenzo Jessica  was at [facility] on 06/24/2025. The patient has multiple diagnoses and to prevent any declines in health, the patient would benefit by taking frequent breaks at work to rest while sitting in a chair. If you have any questions or concerns please contact our office.     Sincerely,    Dennise Devries MD

## 2025-06-24 NOTE — LETTER
June 24, 2025      [facility]  [address] [address] [address]  Phone: [phone]  Fax: [phone]       Patient: Bi Jessica   YOB: 1940  Date of Visit: 06/24/2025    To Whom It May Concern:    Lorenzo Jessica  was at [facility] on 06/24/2025. The patient has multiple diagnoses and to prevent any declines in health, the patient would benefit by taking frequent breaks at work to rest. If you have any questions or concerns please contact our office.     Sincerely,    Dennise Devries MD

## 2025-06-24 NOTE — PROGRESS NOTES
"Patient ID: Bi Jessica is a 85 y.o. male.    Chief Complaint: Medication Refill    History of Present Illness    CHIEF COMPLAINT:  Mr. Jessica presents today for follow up of sleep issues.    SLEEP:  He reports going to bed around 8pm and falling asleep around 9pm after returning home from work at 6pm. He wakes up around 6am but experiences difficulty getting up in the morning. Sleep initiation is described as "fairly good, not great". He experiences daytime sleepiness and reports his sleep is not as restful as it should be. He is uncertain about potential snoring. Recently transitioned from a 3-day to 5-day work week, which has disrupted his sleep pattern.    SOCIAL HISTORY:  65-year-old male who lives alone and independently manages his daily activities.    OCCUPATIONAL HISTORY:  He works at [employer] from 9:00 AM to 6:00 PM daily in a standing position, which he finds physically demanding. He typically arrives an hour early for his shift.    MEDICATIONS:  Previously on Lexapro, now taking Abilify at night for sleep management. He reports compliance with medication but notes current supply is nearly depleted.      ROS:  General: -fever, -chills, +fatigue, -weight gain, -weight loss, +sleep disturbances  Eyes: -vision changes, -redness, -discharge  ENT: -ear pain, -nasal congestion, -sore throat  Cardiovascular: -chest pain, -palpitations, -lower extremity edema  Respiratory: -cough, -shortness of breath, +snoring  Gastrointestinal: -abdominal pain, -nausea, -vomiting, -diarrhea, -constipation, -blood in stool  Genitourinary: -dysuria, -hematuria, -frequency  Musculoskeletal: -joint pain, -muscle pain  Skin: -rash, -lesion  Neurological: -headache, -dizziness, -numbness, -tingling  Psychiatric: -anxiety, -depression, +sleep difficulty         Pmh, Psh, Family Hx, Social Hx updated in Epic Tabs today.       1/14/2025    11:05 AM 12/9/2024    10:17 AM 12/5/2023     9:20 AM 10/11/2022    11:04 AM 10/5/2021    10:34 AM " 9/23/2020     1:52 PM 5/20/2019     7:45 AM   Depression Patient Health Questionnaire   Over the last two weeks how often have you been bothered by little interest or pleasure in doing things Several days Not at all Several days Several days  Not at all  Several days  Several days    Over the last two weeks how often have you been bothered by feeling down, depressed or hopeless Several days Not at all Several days Several days Not at all  Several days  Several days    PHQ-2 Total Score 2 0 2 2 0 2 2       Data saved with a previous flowsheet row definition       Active Problem List with Overview Notes    Diagnosis Date Noted    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    Major depressive disorder, single episode, unspecified 06/05/2023    Other insomnia 10/11/2022    Aortic atherosclerosis 05/20/2019     CXR 1/23/19      PAF (paroxysmal atrial fibrillation) 05/14/2019    BPH with obstruction/lower urinary tract symptoms     History of colon polyps 04/30/2017     Last Colonoscopy in 2012      Left inguinal hernia 06/09/2016     Surgical repair is scheduled      Hyperlipidemia associated with type 2 diabetes mellitus     Hypertension associated with diabetes     Diabetes mellitus type II, controlled     Anxiety        Past Medical History:   Diagnosis Date    Anxiety     Atrial fibrillation     BPH with obstruction/lower urinary tract symptoms     Diabetes mellitus type II, controlled     Hyperlipidemia associated with type 2 diabetes mellitus     Hypertension associated with diabetes     Major depressive disorder, single episode, unspecified        Past Surgical History:   Procedure Laterality Date    CARDIOVERSION N/A 1/23/2019    Procedure: CARDIOVERSION;  Surgeon: Charbel Pandey MD;  Location: [facility] CATH LAB;  Service: Cardiology;  Laterality: N/A;    COLONOSCOPY N/A 5/1/2017    Procedure: COLONOSCOPY;  Surgeon: Vishal Andrade MD;  Location: [facility] ENDO;  Service: Endoscopy;  Laterality: N/A;    TONSILLECTOMY         Family  History   Problem Relation Name Age of Onset    Heart disease Mother Erlinda     Heart disease Father Salinas     Heart disease Brother Claus     Cancer Brother Claus        Social History     Socioeconomic History    Marital status:     Number of children: 2   Tobacco Use    Smoking status: Former    Smokeless tobacco: Never   Substance and Sexual Activity    Alcohol use: Yes     Alcohol/week: 3.0 standard drinks of alcohol     Types: 3 Glasses of wine per week     Comment: occasionally     Drug use: No    Sexual activity: Not Currently     Partners: Female     Social Drivers of Health     Financial Resource Strain: Low Risk  (4/14/2025)    Overall Financial Resource Strain (CARDIA)     Difficulty of Paying Living Expenses: Not hard at all   Food Insecurity: No Food Insecurity (4/14/2025)    Hunger Vital Sign     Worried About Running Out of Food in the Last Year: Never true     Ran Out of Food in the Last Year: Never true   Transportation Needs: No Transportation Needs (4/14/2025)    PRAPARE - Transportation     Lack of Transportation (Medical): No     Lack of Transportation (Non-Medical): No   Physical Activity: Insufficiently Active (4/14/2025)    Exercise Vital Sign     Days of Exercise per Week: 2 days     Minutes of Exercise per Session: 10 min   Stress: Stress Concern Present (4/14/2025)    [employer] [place] [employer] - Occupational Stress Questionnaire     Feeling of Stress : To some extent   Housing Stability: Low Risk  (4/14/2025)    Housing Stability Vital Sign     Unable to Pay for Housing in the Last Year: No     Number of Times Moved in the Last Year: 0     Homeless in the Last Year: No       Medications Ordered Prior to Encounter[1]    Review of patient's allergies indicates:   Allergen Reactions    Codeine          Review of Systems    General - Well developed, alert and oriented in NAD  HEENT - normocephalic, no evidence of trauma, sclera white, EOMI  Neck - full range of motion  COR  - regular rate and rhythm without murmurs or gallops  Lungs - Clear  Abdomen - soft, non-tender  Ext - no cyanosis or edema    Physical Exam     Assessment:     1. Hypertension associated with diabetes    2. Controlled type 2 diabetes mellitus without complication, without long-term current use of insulin    3. Hyperlipidemia associated with type 2 diabetes mellitus    4. PAF (paroxysmal atrial fibrillation)    5. Insomnia, unspecified type    6. Current moderate episode of major depressive disorder without prior episode    7. Other physical and mental strain related to work        LABS:   Lab Results   Component Value Date    HGBA1C 5.6 01/07/2025    HGBA1C 5.6 06/19/2024    HGBA1C 5.5 12/05/2023      Lab Results   Component Value Date    CHOL 165 01/07/2025    CHOL 174 06/19/2024    CHOL 183 12/05/2023     Lab Results   Component Value Date    LDLCALC 81.2 01/07/2025    LDLCALC 97.2 06/19/2024    LDLCALC 103.6 12/05/2023     Lab Results   Component Value Date    WBC 11.17 01/27/2025    HGB 15.0 01/27/2025    HCT 46.7 01/27/2025     01/27/2025    CHOL 165 01/07/2025    TRIG 124 01/07/2025    HDL 59 01/07/2025    ALT 23 01/27/2025    AST 26 01/27/2025     01/27/2025    K 4.4 01/27/2025     01/27/2025    CREATININE 1.1 01/27/2025    BUN 25 (H) 01/27/2025    CO2 21 (L) 01/27/2025    TSH 1.399 01/07/2025    PSA 2.9 10/10/2017    INR 1.0 01/22/2019    HGBA1C 5.6 01/07/2025       Plan:   Bi was seen today for medication refill.    Diagnoses and all orders for this visit:    Hypertension associated with diabetes    Controlled type 2 diabetes mellitus without complication, without long-term current use of insulin  -     metFORMIN (GLUCOPHAGE) 850 MG tablet; Take 1 tablet (850 mg total) by mouth once daily.  -     Comprehensive Metabolic Panel; Future  -     Hemoglobin A1C; Future    Hyperlipidemia associated with type 2 diabetes mellitus  -     simvastatin (ZOCOR) 10 MG tablet; Take 1 tablet (10 mg  total) by mouth every evening.  -     Lipid Panel; Future    PAF (paroxysmal atrial fibrillation)  -     apixaban (ELIQUIS) 5 mg Tab; Take 1 tablet (5 mg total) by mouth 2 (two) times daily.    Insomnia, unspecified type  -     ARIPiprazole (ABILIFY) 2 MG Tab; Take 1 tablet (2 mg total) by mouth once daily.    Current moderate episode of major depressive disorder without prior episode  -     EScitalopram oxalate (LEXAPRO) 10 MG tablet; Take 1 tablet (10 mg total) by mouth once daily.    Other physical and mental strain related to work    - Assessed physical strain from prolonged standing and schedule changes at work.  - Mr. Jessica will continue current work schedule at [employer], with upcoming change to 3 days on, 4 days off.  - Will provide workplace accommodation letter for intermittent sitting breaks to reduce physical strain.    FOLLOW-UP AND ADDITIONAL CARE:  - Reviewed cardiovascular status.  - Mr. Jessica to keep upcoming cardiology appointment in 2 days.  - Ordered labs.  - Follow up in 6 months.           Face to Face time with patient:  2:20 PM CDT -      Each patient to whom he or she provides medical services by telemedicine is:  (1) informed of the relationship between the physician and patient and the respective role of any other health care provider with respect to management of the patient; and (2) notified that he or she may decline to receive medical services by telemedicine and may withdraw from such care at any time.    I spent a total of     32  minutes face to face and non-face to face on the date of this visit.This includes time preparing to see the patient (eg, review of tests, notes), obtaining and/or reviewing additional history from an independent historian and/or outside medical records, documenting clinical information in the electronic health record, independently interpreting results and/or communicating results to the patient/family/caregiver, or care coordinator.  Visit today included  increased complexity associated with the care of the episodic problem addressed and managing the longitudinal care of the patient due to the serious and/or complex managed problem(s).    There are no Patient Instructions on file for this visit.    Follow up in about 6 months (around 12/24/2025), or if symptoms worsen or fail to improve.  The ASCVD Risk score (Zohreh BEEBE, et al., 2019) failed to calculate for the following reasons:    The 2019 ASCVD risk score is only valid for ages 40 to 79    This note was generated with the assistance of ambient listening technology. Verbal consent was obtained by the patient and accompanying visitor(s) for the recording of patient appointment to facilitate this note. I attest to having reviewed and edited the generated note for accuracy, though some syntax or spelling errors may persist. Please contact the author of this note for any clarification.         [1]   Current Outpatient Medications on File Prior to Visit   Medication Sig Dispense Refill    ACCU-CHEK GUIDE TEST STRIPS Strp 1 strip by Other route once daily. 100 strip 3    calcium-vitamin D3 500 mg(1,250mg) -200 unit per tablet Take 1 tablet by mouth every evening. Takes once week      cyanocobalamin (VITAMIN B-12) 1000 MCG tablet Take 100 mcg by mouth once daily.      fish oil-omega-3 fatty acids 300-1,000 mg capsule Take 2 g by mouth once daily.      FLAXSEED OIL ORAL Take by mouth once daily.      magnesium 30 mg Tab Take by mouth once daily.      multivitamin with minerals tablet Take 1 tablet by mouth once daily.      vitamin E 1000 UNIT capsule Take 1,000 Units by mouth once daily. Takes once a week      zinc gluconate 50 mg tablet Take 50 mg by mouth once daily.      [DISCONTINUED] apixaban (ELIQUIS) 5 mg Tab Take 1 tablet by mouth twice daily 180 tablet 3    [DISCONTINUED] ARIPiprazole (ABILIFY) 2 MG Tab Take 1 tablet (2 mg total) by mouth once daily. 90 tablet 3    [DISCONTINUED] metFORMIN (GLUCOPHAGE) 850 MG  tablet Take 1 tablet by mouth once daily 90 tablet 0    [DISCONTINUED] simvastatin (ZOCOR) 10 MG tablet Take 1 tablet by mouth every evening.      [DISCONTINUED] EScitalopram oxalate (LEXAPRO) 10 MG tablet Take 1 tablet by mouth once daily (Patient not taking: Reported on 6/24/2025) 90 tablet 3     No current facility-administered medications on file prior to visit.

## 2025-06-26 ENCOUNTER — RESULTS FOLLOW-UP (OUTPATIENT)
Dept: FAMILY MEDICINE | Facility: CLINIC | Age: 85
End: 2025-06-26

## 2025-07-16 ENCOUNTER — PATIENT MESSAGE (OUTPATIENT)
Dept: FAMILY MEDICINE | Facility: CLINIC | Age: 85
End: 2025-07-16
Payer: MEDICARE

## 2025-07-16 DIAGNOSIS — Z56.6 OTHER PHYSICAL AND MENTAL STRAIN RELATED TO WORK: Primary | ICD-10-CM

## 2025-07-16 SDOH — SOCIAL DETERMINANTS OF HEALTH (SDOH): OTHER PHYSICAL AND MENTAL STRAIN RELATED TO WORK: Z56.6

## 2025-08-28 DIAGNOSIS — E78.5 HYPERLIPIDEMIA ASSOCIATED WITH TYPE 2 DIABETES MELLITUS: ICD-10-CM

## 2025-08-28 DIAGNOSIS — E11.9 CONTROLLED TYPE 2 DIABETES MELLITUS WITHOUT COMPLICATION, WITHOUT LONG-TERM CURRENT USE OF INSULIN: ICD-10-CM

## 2025-08-28 DIAGNOSIS — E11.69 HYPERLIPIDEMIA ASSOCIATED WITH TYPE 2 DIABETES MELLITUS: ICD-10-CM

## 2025-08-29 RX ORDER — METFORMIN HYDROCHLORIDE 850 MG/1
850 TABLET ORAL DAILY
Qty: 90 TABLET | Refills: 1 | Status: SHIPPED | OUTPATIENT
Start: 2025-08-29

## 2025-08-29 RX ORDER — SIMVASTATIN 10 MG/1
10 TABLET, FILM COATED ORAL NIGHTLY
Qty: 90 TABLET | Refills: 3 | Status: SHIPPED | OUTPATIENT
Start: 2025-08-29